# Patient Record
Sex: MALE | Race: WHITE | NOT HISPANIC OR LATINO | Employment: OTHER | URBAN - METROPOLITAN AREA
[De-identification: names, ages, dates, MRNs, and addresses within clinical notes are randomized per-mention and may not be internally consistent; named-entity substitution may affect disease eponyms.]

---

## 2017-11-06 RX ORDER — FINASTERIDE 5 MG/1
5 TABLET, FILM COATED ORAL DAILY
Qty: 90 TABLET | Refills: 3 | Status: SHIPPED | OUTPATIENT
Start: 2017-11-06 | End: 2020-04-16

## 2020-04-16 ENCOUNTER — HOSPITAL ENCOUNTER (EMERGENCY)
Facility: HOSPITAL | Age: 85
Discharge: HOME | End: 2020-04-16
Attending: EMERGENCY MEDICINE
Payer: MEDICARE

## 2020-04-16 ENCOUNTER — HOSPITAL ENCOUNTER (EMERGENCY)
Facility: HOSPITAL | Age: 85
Discharge: OTHER INSTITUTION | End: 2020-04-16
Attending: EMERGENCY MEDICINE
Payer: MEDICARE

## 2020-04-16 VITALS
DIASTOLIC BLOOD PRESSURE: 81 MMHG | RESPIRATION RATE: 17 BRPM | SYSTOLIC BLOOD PRESSURE: 174 MMHG | HEART RATE: 55 BPM | TEMPERATURE: 98 F | BODY MASS INDEX: 27.09 KG/M2 | OXYGEN SATURATION: 97 % | WEIGHT: 200 LBS | HEIGHT: 72 IN

## 2020-04-16 VITALS
OXYGEN SATURATION: 98 % | WEIGHT: 200 LBS | RESPIRATION RATE: 16 BRPM | SYSTOLIC BLOOD PRESSURE: 180 MMHG | HEART RATE: 71 BPM | DIASTOLIC BLOOD PRESSURE: 87 MMHG | BODY MASS INDEX: 27.09 KG/M2 | TEMPERATURE: 98 F | HEIGHT: 72 IN

## 2020-04-16 DIAGNOSIS — G08 CAVERNOUS SINUS THROMBOSIS: ICD-10-CM

## 2020-04-16 DIAGNOSIS — R42 DIZZINESS: Primary | ICD-10-CM

## 2020-04-16 DIAGNOSIS — R11.0 NAUSEA: ICD-10-CM

## 2020-04-16 DIAGNOSIS — J01.20 ACUTE NON-RECURRENT ETHMOIDAL SINUSITIS: Primary | ICD-10-CM

## 2020-04-16 PROBLEM — J32.3 SPHENOID SINUSITIS: Status: ACTIVE | Noted: 2020-04-16

## 2020-04-16 LAB
ALBUMIN SERPL BCP-MCNC: 4.2 G/DL (ref 3.5–5.2)
ALP SERPL-CCNC: 56 U/L (ref 55–135)
ALT SERPL W/O P-5'-P-CCNC: 13 U/L (ref 10–44)
ANION GAP SERPL CALC-SCNC: 7 MMOL/L (ref 8–16)
AST SERPL-CCNC: 20 U/L (ref 10–40)
BASOPHILS # BLD AUTO: 0.05 K/UL (ref 0–0.2)
BASOPHILS NFR BLD: 0.5 % (ref 0–1.9)
BILIRUB SERPL-MCNC: 1.7 MG/DL (ref 0.1–1)
BNP SERPL-MCNC: 49 PG/ML (ref 0–99)
BUN SERPL-MCNC: 13 MG/DL (ref 8–23)
CALCIUM SERPL-MCNC: 9 MG/DL (ref 8.7–10.5)
CHLORIDE SERPL-SCNC: 108 MMOL/L (ref 95–110)
CO2 SERPL-SCNC: 24 MMOL/L (ref 23–29)
CREAT SERPL-MCNC: 0.8 MG/DL (ref 0.5–1.4)
DIFFERENTIAL METHOD: ABNORMAL
EOSINOPHIL # BLD AUTO: 0.1 K/UL (ref 0–0.5)
EOSINOPHIL NFR BLD: 0.8 % (ref 0–8)
ERYTHROCYTE [DISTWIDTH] IN BLOOD BY AUTOMATED COUNT: 13.4 % (ref 11.5–14.5)
EST. GFR  (AFRICAN AMERICAN): >60 ML/MIN/1.73 M^2
EST. GFR  (NON AFRICAN AMERICAN): >60 ML/MIN/1.73 M^2
GLUCOSE SERPL-MCNC: 102 MG/DL (ref 70–110)
HCT VFR BLD AUTO: 49.2 % (ref 40–54)
HGB BLD-MCNC: 16.2 G/DL (ref 14–18)
IMM GRANULOCYTES # BLD AUTO: 0.06 K/UL (ref 0–0.04)
IMM GRANULOCYTES NFR BLD AUTO: 0.7 % (ref 0–0.5)
INR PPP: 1.1
LACTATE SERPL-SCNC: 2.2 MMOL/L (ref 0.5–1.9)
LYMPHOCYTES # BLD AUTO: 1.6 K/UL (ref 1–4.8)
LYMPHOCYTES NFR BLD: 18 % (ref 18–48)
MAGNESIUM SERPL-MCNC: 2 MG/DL (ref 1.6–2.6)
MCH RBC QN AUTO: 32.3 PG (ref 27–31)
MCHC RBC AUTO-ENTMCNC: 32.9 G/DL (ref 32–36)
MCV RBC AUTO: 98 FL (ref 82–98)
MONOCYTES # BLD AUTO: 0.5 K/UL (ref 0.3–1)
MONOCYTES NFR BLD: 5.8 % (ref 4–15)
NEUTROPHILS # BLD AUTO: 6.8 K/UL (ref 1.8–7.7)
NEUTROPHILS NFR BLD: 74.2 % (ref 38–73)
NRBC BLD-RTO: 0 /100 WBC
PLATELET # BLD AUTO: 197 K/UL (ref 150–350)
PMV BLD AUTO: 10.3 FL (ref 9.2–12.9)
POTASSIUM SERPL-SCNC: 3.9 MMOL/L (ref 3.5–5.1)
PROT SERPL-MCNC: 7 G/DL (ref 6–8.4)
PROTHROMBIN TIME: 13.6 SEC (ref 10.6–14.8)
RBC # BLD AUTO: 5.01 M/UL (ref 4.6–6.2)
SARS-COV-2 RDRP RESP QL NAA+PROBE: NEGATIVE
SODIUM SERPL-SCNC: 139 MMOL/L (ref 136–145)
TROPONIN I SERPL DL<=0.01 NG/ML-MCNC: <0.03 NG/ML
WBC # BLD AUTO: 9.11 K/UL (ref 3.9–12.7)

## 2020-04-16 PROCEDURE — 85610 PROTHROMBIN TIME: CPT

## 2020-04-16 PROCEDURE — 36415 COLL VENOUS BLD VENIPUNCTURE: CPT

## 2020-04-16 PROCEDURE — 96376 TX/PRO/DX INJ SAME DRUG ADON: CPT

## 2020-04-16 PROCEDURE — 99285 EMERGENCY DEPT VISIT HI MDM: CPT | Mod: 25

## 2020-04-16 PROCEDURE — 93005 ELECTROCARDIOGRAM TRACING: CPT | Performed by: INTERNAL MEDICINE

## 2020-04-16 PROCEDURE — 83735 ASSAY OF MAGNESIUM: CPT

## 2020-04-16 PROCEDURE — 85025 COMPLETE CBC W/AUTO DIFF WBC: CPT

## 2020-04-16 PROCEDURE — 83605 ASSAY OF LACTIC ACID: CPT

## 2020-04-16 PROCEDURE — 99285 EMERGENCY DEPT VISIT HI MDM: CPT | Mod: ,,, | Performed by: EMERGENCY MEDICINE

## 2020-04-16 PROCEDURE — 25000003 PHARM REV CODE 250: Performed by: EMERGENCY MEDICINE

## 2020-04-16 PROCEDURE — 99284 EMERGENCY DEPT VISIT MOD MDM: CPT | Mod: 25

## 2020-04-16 PROCEDURE — 80053 COMPREHEN METABOLIC PANEL: CPT

## 2020-04-16 PROCEDURE — 84484 ASSAY OF TROPONIN QUANT: CPT

## 2020-04-16 PROCEDURE — 63600175 PHARM REV CODE 636 W HCPCS: Performed by: EMERGENCY MEDICINE

## 2020-04-16 PROCEDURE — 87040 BLOOD CULTURE FOR BACTERIA: CPT

## 2020-04-16 PROCEDURE — U0002 COVID-19 LAB TEST NON-CDC: HCPCS

## 2020-04-16 PROCEDURE — 99285 PR EMERGENCY DEPT VISIT,LEVEL V: ICD-10-PCS | Mod: ,,, | Performed by: EMERGENCY MEDICINE

## 2020-04-16 PROCEDURE — 83880 ASSAY OF NATRIURETIC PEPTIDE: CPT

## 2020-04-16 PROCEDURE — 96365 THER/PROPH/DIAG IV INF INIT: CPT

## 2020-04-16 PROCEDURE — 96375 TX/PRO/DX INJ NEW DRUG ADDON: CPT

## 2020-04-16 RX ORDER — MECLIZINE HCL 12.5 MG 12.5 MG/1
25 TABLET ORAL
Status: COMPLETED | OUTPATIENT
Start: 2020-04-16 | End: 2020-04-16

## 2020-04-16 RX ORDER — FLUTICASONE PROPIONATE 50 MCG
1 SPRAY, SUSPENSION (ML) NASAL 2 TIMES DAILY PRN
Qty: 15 G | Refills: 0 | Status: SHIPPED | OUTPATIENT
Start: 2020-04-16

## 2020-04-16 RX ORDER — VANCOMYCIN HCL IN 5 % DEXTROSE 1G/250ML
1000 PLASTIC BAG, INJECTION (ML) INTRAVENOUS ONCE
Status: COMPLETED | OUTPATIENT
Start: 2020-04-16 | End: 2020-04-16

## 2020-04-16 RX ORDER — CROMOLYN SODIUM 5.2 MG/ML
1 SPRAY, METERED NASAL 4 TIMES DAILY
Qty: 1 BOTTLE | Refills: 0 | Status: SHIPPED | OUTPATIENT
Start: 2020-04-16

## 2020-04-16 RX ADMIN — VANCOMYCIN HYDROCHLORIDE 500 MG: 500 INJECTION, POWDER, LYOPHILIZED, FOR SOLUTION INTRAVENOUS at 12:04

## 2020-04-16 RX ADMIN — MECLIZINE HYDROCHLORIDE 25 MG: 12.5 TABLET ORAL at 09:04

## 2020-04-16 RX ADMIN — VANCOMYCIN HYDROCHLORIDE 1000 MG: 1 INJECTION, POWDER, LYOPHILIZED, FOR SOLUTION INTRAVENOUS at 12:04

## 2020-04-16 RX ADMIN — PIPERACILLIN AND TAZOBACTAM 4.5 G: 4; .5 INJECTION, POWDER, FOR SOLUTION INTRAVENOUS at 11:04

## 2020-04-16 NOTE — HPI
84 yo male with hx of dizziness, dementia presents complaining of dizziness and nausea. He notes that he has had intermittent dizziness for years. He did not sleep well last night and felt a little nauseated this morning which prompted him to get checked out in the ER. Outside CT head showed isolated right sphenoid sinusitis. He was transferred to [facility] for ENT eval. He notes that the dizziness comes and goes, and lasts for a few minutes at a time. It happens sometimes if he stands up too quickly. Gets better when he lies down. He describes it as a feeling of unsteadiness (denies room spinning sensation). He notes that he sometimes has a headache and sees bright spots prior to these episodes. No recent sinus infections or nasal drainage, facial pressure/pain, nasal obstruction. Denies tinnitus, hearing loss during these episodes.  He denies changes in his vision at present. Does not feel dizzy right now. No recent head trauma. No fevers, chills, vomiting, weight loss, extremity weakness, slurred speech.

## 2020-04-16 NOTE — ED NOTES
Pt returned from MRI; pt alert and oriented x 4; respirations even, unlabored; attending at bedside to assess pt; awaiting further orders; will continue to monitor and provide care

## 2020-04-16 NOTE — SUBJECTIVE & OBJECTIVE
Medications:  Continuous Infusions:  Scheduled Meds:  PRN Meds:     Current Facility-Administered Medications on File Prior to Encounter   Medication    [COMPLETED] meclizine tablet 25 mg    [COMPLETED] piperacillin-tazobactam 4.5 g in dextrose 5 % 100 mL IVPB (ready to mix system)    [COMPLETED] vancomycin in dextrose 5 % 1 gram/250 mL IVPB 1,000 mg    And    [COMPLETED] vancomycin 500 mg in dextrose 5 % 100 mL IVPB (ready to mix system)    [DISCONTINUED] vancomycin (VANCOCIN) 1,250 mg in dextrose 5 % 250 mL IVPB    [DISCONTINUED] vancomycin (VANCOCIN) 1,500 mg in dextrose 5 % 500 mL IVPB    [DISCONTINUED] vancomycin - pharmacy to dose     Current Outpatient Medications on File Prior to Encounter   Medication Sig    [DISCONTINUED] finasteride (PROSCAR) 5 mg tablet Take 1 tablet (5 mg total) by mouth once daily.    [DISCONTINUED] vitamins-lipotropics (LIPO-FLAVONOID PLUS) 200-100 mg Tab Take 1 tablet by mouth 3 (three) times daily.       Review of patient's allergies indicates:   Allergen Reactions    No known drug allergies        Past Medical History:   Diagnosis Date    BPH (benign prostatic hypertrophy)     Scrotal mass     Wears glasses      Past Surgical History:   Procedure Laterality Date    EYE SURGERY  BILAT CATARACT WITH LENS    PROSTATE SURGERY      TONSILLECTOMY       Family History     None        Tobacco Use    Smoking status: Never Smoker    Smokeless tobacco: Never Used   Substance and Sexual Activity    Alcohol use: No    Drug use: No    Sexual activity: Not on file     Review of Systems   Constitutional: Negative for chills and fever.   HENT: Negative for ear discharge, ear pain, hearing loss, nosebleeds, rhinorrhea, sinus pressure, sinus pain, tinnitus and trouble swallowing.    Eyes: Negative for visual disturbance.   Respiratory: Negative for shortness of breath.    Cardiovascular: Negative for chest pain.   Gastrointestinal: Positive for nausea. Negative for vomiting.    Endocrine: Negative.    Musculoskeletal: Positive for back pain.   Skin: Negative.    Neurological: Positive for dizziness. Negative for seizures, syncope, speech difficulty and numbness.     Objective:     Vital Signs (Most Recent):  Temp: 97.1 °F (36.2 °C) (04/16/20 1428)  Pulse: (!) 54 (04/16/20 1428)  Resp: 18 (04/16/20 1428)  BP: (!) 175/84 (04/16/20 1428)  SpO2: 96 % (04/16/20 1428) Vital Signs (24h Range):  Temp:  [97.1 °F (36.2 °C)-98 °F (36.7 °C)] 97.1 °F (36.2 °C)  Pulse:  [51-66] 54  Resp:  [14-22] 18  SpO2:  [95 %-99 %] 96 %  BP: (143-176)/(69-99) 175/84     Weight: 90.7 kg (200 lb)  Body mass index is 27.12 kg/m².        Physical Exam  Physical Exam    Vitals:    04/16/20 1428   BP: (!) 175/84   Pulse: (!) 54   Resp: 18   Temp: 97.1 °F (36.2 °C)       General: AAOx3, NAD.   Right Ear: External Auditory Canal WNL,TM w/o masses/lesions/perforations/effusions.  Left Ear:  External Auditory Canal WNL,TM w/o masses/lesions/perforations/effusions.  Nose: No gross nasal septal deviation.  Inferior Turbinates WNL bilaterally.  No septal perforation or hematomas  No masses/lesions. No discharge or purulence seen.  Oral Cavity: FOM Soft, no masses palpated. Oral Tongue mobile. Hard Palate WNL.  Oropharynx: BOT WNL.  No masses/lesions noted. Tonsillar fossa without lesions. Soft palate without masses. Midline uvula.  Neck: No palpable lymphadenopathy at levels I - VI. Full ROM. No thyroid nodules palpated.  Face: HB I bilaterally. Normal sensation.  Eyes: EOM intact bilaterally, PERRLA bilaterally. No exophthalmos/enopthalmos.  Neuro: CN II-XII grossly intact    Significant Labs:  All pertinent labs from the last 24 hours have been reviewed.    Significant Diagnostics:  I have reviewed and interpreted all pertinent imaging results/findings within the past 24 hours.     Chronic right sphenoid sinusitis

## 2020-04-16 NOTE — ED TRIAGE NOTES
Pt arrived via EMS from outside facility; pt transferred for ENT consult; pt endorses dizziness; denies SOB, CP, N/V; pt A&Ox4, respirations even, unlabored

## 2020-04-16 NOTE — DISCHARGE INSTRUCTIONS
Follow-up with primary care physician within 1 week of discharge if possible.  Follow-up with ENT physicians, referral provided.  He should be contacted to schedule an appointment for your ENT follow-up.  If you are not contacted by Tuesday 4/21, please call the number provided in your discharge summary to schedule your appointment.    Return to the ED with worsening of dizziness, fall, or loss of consciousness.

## 2020-04-16 NOTE — ED PROVIDER NOTES
Encounter Date: 4/16/2020       History     Chief Complaint   Patient presents with    Dizziness     states since yesterday worse with movement, denies cough, fever or shortness of breathh     Patient presents complaining of dizziness.  Patient states he was dizzy and nauseated throughout the night and very dizzy this morning and very unsteady difficulty walking.  At the worst symptoms are moderate.  History is difficult patient has mild dementia.  Brother also provides additional history stating that he did tell him he was nauseated and had difficulty sleeping last night.        Review of patient's allergies indicates:   Allergen Reactions    No known drug allergies      Past Medical History:   Diagnosis Date    BPH (benign prostatic hypertrophy)     Scrotal mass     Wears glasses      Past Surgical History:   Procedure Laterality Date    EYE SURGERY  BILAT CATARACT WITH LENS    PROSTATE SURGERY      TONSILLECTOMY       Family History   Problem Relation Age of Onset    Prostate cancer Neg Hx     Urolithiasis Neg Hx     Kidney cancer Neg Hx      Social History     Tobacco Use    Smoking status: Never Smoker    Smokeless tobacco: Never Used   Substance Use Topics    Alcohol use: No    Drug use: No     Review of Systems   Neurological: Positive for dizziness.   All other systems reviewed and are negative.      Physical Exam     Initial Vitals [04/16/20 0838]   BP Pulse Resp Temp SpO2   (!) 173/89 60 18 97.7 °F (36.5 °C) 97 %      MAP       --         Physical Exam    Nursing note and vitals reviewed.  Constitutional: He appears well-developed and well-nourished. He is not diaphoretic. No distress.   HENT:   Head: Normocephalic and atraumatic.   Eyes: EOM are normal.   Neck: Normal range of motion. Neck supple.   Pulmonary/Chest: No respiratory distress.   Neurological: He is alert. He has normal strength. No cranial nerve deficit.   Patient has difficulty standing he is unsteady   Skin: Skin is warm and  dry.   Psychiatric: He has a normal mood and affect. His behavior is normal. Judgment and thought content normal.         ED Course   Procedures  Labs Reviewed   COMPREHENSIVE METABOLIC PANEL - Abnormal; Notable for the following components:       Result Value    Total Bilirubin 1.7 (*)     Anion Gap 7 (*)     All other components within normal limits   CBC W/ AUTO DIFFERENTIAL - Abnormal; Notable for the following components:    Mean Corpuscular Hemoglobin 32.3 (*)     Immature Granulocytes 0.7 (*)     Immature Grans (Abs) 0.06 (*)     Gran% 74.2 (*)     All other components within normal limits   CULTURE, BLOOD   CULTURE, BLOOD   MAGNESIUM   TROPONIN I   PROTIME-INR   B-TYPE NATRIURETIC PEPTIDE   SARS-COV-2 RNA AMPLIFICATION, QUAL   LACTIC ACID, PLASMA     EKG Readings: (Independently Interpreted)   EKG is without acute ST changes     ECG Results          EKG 12-LEAD (In process)  Result time 04/16/20 09:04:41    In process by Interface, Lab In [facility] (04/16/20 09:04:41)                 Narrative:    Test Reason : R07.9,    Vent. Rate : 061 BPM     Atrial Rate : 067 BPM     P-R Int : 000 ms          QRS Dur : 086 ms      QT Int : 432 ms       P-R-T Axes : 000 019 050 degrees     QTc Int : 434 ms    Undetermined rhythm  Otherwise normal ECG  When compared with ECG of 14-JAN-2016 09:42,  Current undetermined rhythm precludes rhythm comparison, needs review    Referred By: [email or web address]   SELF           Confirmed By:                             Imaging Results          CT Head Without Contrast (Final result)  Result time 04/16/20 09:40:45    Final result by Ry Tse MD (04/16/20 09:40:45)                 Impression:      Chronic right sphenoid sinusitis, with osseous erosion of the posterior wall and possible involvement of cavernous sinus.  Please correlate for signs and symptoms of cavernous sinus thrombosis.    No CT evidence of acute intraparenchymal process involving the brain.      Electronically signed  by: Ry Tse MD  Date:    04/16/2020  Time:    09:40             Narrative:    EXAMINATION:  CT HEAD WITHOUT CONTRAST    CLINICAL HISTORY:  Dizziness;Focal neuro deficit, new, fixed or worsening, <6 hours;    TECHNIQUE:  CMS Mandated Quality Data-CT Radiation Dose-436    All CT scans at this facility dose modulation, iterative reconstruction, and or weight-based dosing when appropriate to reduce radiation dose to as low as reasonably achievable.    COMPARISON:  None    FINDINGS:  Negative for acute intracranial hemorrhage, midline shift, or mass effect. Ventricles and sulci are normal in size. Mild periventricular white matter hypoattenuation consistent with microangiopathic change. Cerebellar hemispheres and brainstem are unremarkable. Atherosclerotic calcification of the internal carotid arteries.    Complete mucosal opacification of the right sphenoid sinus with right sphenoid sinus osseous thickening and erosion along the posterior wall (images 26 and 27) with possible right cavernous sinus communication.  Mild bilateral polypoid maxillary sinus mucosal thickening. Mastoid air cells are clear.                               X-ray Chest AP Portable (Final result)  Result time 04/16/20 09:17:09    Final result by Ry Tse MD (04/16/20 09:17:09)                 Impression:      Increased perihilar interstitial markings could reflect mild interstitial edema versus atypical infection.      Electronically signed by: Ry Tse MD  Date:    04/16/2020  Time:    09:17             Narrative:    EXAMINATION:  XR CHEST AP PORTABLE    COMPARISON:  01/14/2016    FINDINGS:  Cardiomediastinal silhouette is within normal limits.  Increased interstitial markings involving the perihilar regions bilaterally.  No confluent airspace disease.  No pleural effusion or pneumothorax.  No acute osseous abnormality.Right jose-colon is interposed between the liver and right hemidiaphragm.                                  Medical Decision Making:   ED Management:  Patient has CT concerning for possible right sphenoid sinus erosion into posterior wall and possible communication with the cavernous sinus.  There is no ENT on call at this hospital.  I did discuss this with Dr. Foster ENT on-call with [facility] who advised ER to ER transfer.  Patient was given antibiotics at her hospital Zosyn and vancomycin.  Patient made benefit from more advanced imaging in both neuro surgical and ENT consultation at [facility].  He remains awake alert oriented the safe for transfer.                                 Clinical Impression:       ICD-10-CM ICD-9-CM   1. 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

## 2020-04-16 NOTE — CONSULTS
[facility]  Otorhinolaryngology-Head & Neck Surgery  Consult Note    Patient Name: Elpidio Ayala  MRN: [id number]  Code Status: No Order  Admission Date: 4/16/2020  Hospital Length of Stay: 0 days  Attending Physician: Nydia Bell MD  Primary Care Provider: Cali Win Jr, MD    Patient information was obtained from patient and ER records.     Inpatient consult to ENT  Consult performed by: Chan Márquez Jr., MD  Consult ordered by: Dev Pompa MD        Subjective:     Chief Complaint/Reason for Admission: dizziness    History of Present Illness: 86 yo male with hx of dizziness, dementia presents complaining of dizziness and nausea. He notes that he has had intermittent dizziness for years. He did not sleep well last night and felt a little nauseated this morning which prompted him to get checked out in the ER. Outside CT head showed isolated right sphenoid sinusitis. He was transferred to [facility] for ENT eval. He notes that the dizziness comes and goes, and lasts for a few minutes at a time. It happens sometimes if he stands up too quickly. Gets better when he lies down. He describes it as a feeling of unsteadiness (denies room spinning sensation). He notes that he sometimes has a headache and sees bright spots prior to these episodes. No recent sinus infections or nasal drainage, facial pressure/pain, nasal obstruction. Denies tinnitus, hearing loss during these episodes.  He denies changes in his vision at present. Does not feel dizzy right now. No recent head trauma. No fevers, chills, vomiting, weight loss, extremity weakness, slurred speech.    Medications:  Continuous Infusions:  Scheduled Meds:  PRN Meds:     Current Facility-Administered Medications on File Prior to Encounter   Medication    [COMPLETED] meclizine tablet 25 mg    [COMPLETED] piperacillin-tazobactam 4.5 g in dextrose 5 % 100 mL IVPB (ready to mix system)    [COMPLETED] vancomycin in  dextrose 5 % 1 gram/250 mL IVPB 1,000 mg    And    [COMPLETED] vancomycin 500 mg in dextrose 5 % 100 mL IVPB (ready to mix system)    [DISCONTINUED] vancomycin (VANCOCIN) 1,250 mg in dextrose 5 % 250 mL IVPB    [DISCONTINUED] vancomycin (VANCOCIN) 1,500 mg in dextrose 5 % 500 mL IVPB    [DISCONTINUED] vancomycin - pharmacy to dose     Current Outpatient Medications on File Prior to Encounter   Medication Sig    [DISCONTINUED] finasteride (PROSCAR) 5 mg tablet Take 1 tablet (5 mg total) by mouth once daily.    [DISCONTINUED] vitamins-lipotropics (LIPO-FLAVONOID PLUS) 200-100 mg Tab Take 1 tablet by mouth 3 (three) times daily.       Review of patient's allergies indicates:   Allergen Reactions    No known drug allergies        Past Medical History:   Diagnosis Date    BPH (benign prostatic hypertrophy)     Scrotal mass     Wears glasses      Past Surgical History:   Procedure Laterality Date    EYE SURGERY  BILAT CATARACT WITH LENS    PROSTATE SURGERY      TONSILLECTOMY       Family History     None        Tobacco Use    Smoking status: Never Smoker    Smokeless tobacco: Never Used   Substance and Sexual Activity    Alcohol use: No    Drug use: No    Sexual activity: Not on file     Review of Systems   Constitutional: Negative for chills and fever.   HENT: Negative for ear discharge, ear pain, hearing loss, nosebleeds, rhinorrhea, sinus pressure, sinus pain, tinnitus and trouble swallowing.    Eyes: Negative for visual disturbance.   Respiratory: Negative for shortness of breath.    Cardiovascular: Negative for chest pain.   Gastrointestinal: Positive for nausea. Negative for vomiting.   Endocrine: Negative.    Musculoskeletal: Positive for back pain.   Skin: Negative.    Neurological: Positive for dizziness. Negative for seizures, syncope, speech difficulty and numbness.     Objective:     Vital Signs (Most Recent):  Temp: 97.1 °F (36.2 °C) (04/16/20 1428)  Pulse: (!) 54 (04/16/20 1428)  Resp: 18  (04/16/20 1428)  BP: (!) 175/84 (04/16/20 1428)  SpO2: 96 % (04/16/20 1428) Vital Signs (24h Range):  Temp:  [97.1 °F (36.2 °C)-98 °F (36.7 °C)] 97.1 °F (36.2 °C)  Pulse:  [51-66] 54  Resp:  [14-22] 18  SpO2:  [95 %-99 %] 96 %  BP: (143-176)/(69-99) 175/84     Weight: 90.7 kg (200 lb)  Body mass index is 27.12 kg/m².        Physical Exam  Physical Exam    Vitals:    04/16/20 1428   BP: (!) 175/84   Pulse: (!) 54   Resp: 18   Temp: 97.1 °F (36.2 °C)       General: AAOx3, NAD.   Right Ear: External Auditory Canal WNL,TM w/o masses/lesions/perforations/effusions.  Left Ear:  External Auditory Canal WNL,TM w/o masses/lesions/perforations/effusions.  Nose: No gross nasal septal deviation.  Inferior Turbinates WNL bilaterally.  No septal perforation or hematomas  No masses/lesions. No discharge or purulence seen.  Oral Cavity: FOM Soft, no masses palpated. Oral Tongue mobile. Hard Palate WNL.  Oropharynx: BOT WNL.  No masses/lesions noted. Tonsillar fossa without lesions. Soft palate without masses. Midline uvula.  Neck: No palpable lymphadenopathy at levels I - VI. Full ROM. No thyroid nodules palpated.  Face: HB I bilaterally. Normal sensation.  Eyes: EOM intact bilaterally, PERRLA bilaterally. No exophthalmos/enopthalmos.  Neuro: CN II-XII grossly intact    Significant Labs:  All pertinent labs from the last 24 hours have been reviewed.    Significant Diagnostics:  I have reviewed and interpreted all pertinent imaging results/findings within the past 24 hours.     Chronic right sphenoid sinusitis    Assessment/Plan:     Sphenoid sinusitis  Appears chronic in nature on scan. No classic symptoms of sinusitis. Questionable dehiscence of posterior wall of sphenoid sinus on CT head.    -MRV not consistent with cavernous sinus thrombosis  -unlikely that sphenoid sinusitis is the cause of his dizziness  -patient can follow up with Dr. Smith as an outpatient for his sinusitis  -recommend daily flonase and ocean  spray      Dizziness  Likely multifactorial. His story sounds consistent with a cardiac/orthostatic source. There may be a migraine component. Suspicion for cavernous sinus thrombosis is very low, as this usually presents with headache, visual changes, chemosis, etc.    -recommend PCP follow up for dizziness workup      VTE Risk Mitigation (From admission, onward)    None          Thank you for your consult.     Chan Márquez Jr., MD  Otorhinolaryngology-Head & Neck Surgery  [facility]

## 2020-04-16 NOTE — ED NOTES
Pt and brother states he has been dizzy for years, mainly when he gets up to fast. Wha'ts different is that he got nausea. Denies CP or SOB. Gets dizzy on movement

## 2020-04-16 NOTE — ASSESSMENT & PLAN NOTE
Likely multifactorial. His story sounds consistent with a cardiac/orthostatic source. There may be a migraine component. Suspicion for cavernous sinus thrombosis is very low, as this usually presents with headache, visual changes, chemosis, etc.    -recommend PCP follow up for dizziness workup

## 2020-04-16 NOTE — PROGRESS NOTES
VANCOMYCIN PHARMACOKINETIC NOTE:  Vancomycin Day # 1    Objective/Assessment:    Diagnosis/Indication for Vancomycin: Bone/Joint infection     85 y.o., male; Actual Body Weight = 90.7 kg (200 lb).    The patient has the following labs:  4/16/2020 Estimated Creatinine Clearance: 75.4 mL/min (based on SCr of 0.8 mg/dL). Lab Results   Component Value Date    BUN 13 04/16/2020       Lab Results   Component Value Date    WBC 9.11 04/16/2020          Plan:  Adjust vancomycin dose and/or frequency based on the patient's actual weight and renal function:  Initiate Vancomycin 1500 mg IV every 18 hours.  Orders have been entered into patient's chart.      Vancomycin trough level has been ordered for 4/18/20 @ 17:00.    Pharmacy will manage vancomycin therapy, monitor serum vancomycin levels, monitor renal function and adjust regimen as necessary.    Thank you for allowing us to participate in this patient's care.     Axel Woodard 4/16/2020 1:16 PM  Department of Pharmacy  Ext [phone]

## 2020-04-16 NOTE — ASSESSMENT & PLAN NOTE
Appears chronic in nature on scan. No classic symptoms of sinusitis. Questionable dehiscence of posterior wall of sphenoid sinus on CT head.    -MRV not consistent with cavernous sinus thrombosis  -unlikely that sphenoid sinusitis is the cause of his dizziness  -patient can follow up with Dr. Smith as an outpatient for his sinusitis  -recommend daily flonase and ocean spray

## 2020-04-16 NOTE — PROVIDER PROGRESS NOTES - EMERGENCY DEPT.
Emergency Department TeleTRIAGE Encounter Note      CHIEF COMPLAINT    Chief Complaint   Patient presents with    Dizziness     dizziness and nausea onset today, transfer from [facility] for ENT consult.       VITAL SIGNS   Initial Vitals [04/16/20 1428]   BP Pulse Resp Temp SpO2   (!) 175/84 (!) 54 18 97.1 °F (36.2 °C) 96 %      MAP       --            ALLERGIES    Review of patient's allergies indicates:   Allergen Reactions    No known drug allergies        PROVIDER TRIAGE NOTE  Transfer from outside hospital, initial complain of dizziness difficulty walking on stable gait.  CT scan showed sinusitis with possible right sphenoid erosion possibly communicated with the cavernous sinus.  Labs as noted.  Patient is not complaining of pain.  Will place ENT consult and attempt to discuss with them.  MRI brain with and without for the character a shin of sinusitis/lesion.      ORDERS  Labs Reviewed - No data to display    Imaging Results    None           Virtual Visit Note: The provider triage portion of this emergency department evaluation and documentation was performed via [employer], a HIPAA-compliant telemedicine application, in concert with a tele-presenter in the room. A face to face patient evaluation with one of my colleagues will occur once the patient is placed in an emergency department room.      DISCLAIMER: This note was prepared with [employer] voice recognition transcription software. Garbled syntax, mangled pronouns, and other bizarre constructions may be attributed to that software system.

## 2020-04-16 NOTE — ED NOTES
Pt A&Ox4, NAD noted at this time; respirations even, unlabored; awaiting further orders; brother at bedside at this time; will continue to monitor and provide care

## 2020-04-16 NOTE — ED PROVIDER NOTES
Encounter Date: 4/16/2020       History     Chief Complaint   Patient presents with    Dizziness     dizziness and nausea onset today, transfer from [facility] for ENT consult.     85 year old male with PMH of BPH and dementia who presents as a transfer from [place] for ENT evaluation. Patient initially presented to outside ED for dizziness and nausea. Patient demented at baseline so his brother gave most of the history. Brother states pt called him this morning complaining of nausea and inability to sleep last night. No headache or vomiting. He has had postural dizziness for many years but never been evaluated for this. He was taken to [place] where CT was concerning for possible right sphenoid sinus erosion into posterior wall and possible communication with the cavernous sinus.  Patient was given antibiotics at her hospital Zosyn and vancomycin. OSH does not have ENT so patient was transferred here.  No known recent fevers, cough, chest pain, or SOB        Review of patient's allergies indicates:   Allergen Reactions    No known drug allergies      Past Medical History:   Diagnosis Date    BPH (benign prostatic hypertrophy)     Scrotal mass     Wears glasses      Past Surgical History:   Procedure Laterality Date    EYE SURGERY  BILAT CATARACT WITH LENS    PROSTATE SURGERY      TONSILLECTOMY       Family History   Problem Relation Age of Onset    Prostate cancer Neg Hx     Urolithiasis Neg Hx     Kidney cancer Neg Hx      Social History     Tobacco Use    Smoking status: Never Smoker    Smokeless tobacco: Never Used   Substance Use Topics    Alcohol use: No    Drug use: No     Review of Systems   Constitutional: Negative for fever.   HENT: Negative for sore throat.    Respiratory: Negative for shortness of breath.    Cardiovascular: Negative for chest pain.   Gastrointestinal: Positive for nausea.   Genitourinary: Negative for dysuria.   Musculoskeletal: Negative for back pain.   Skin:  Negative for rash.   Neurological: Positive for dizziness. Negative for weakness.   Hematological: Does not bruise/bleed easily.       Physical Exam     Initial Vitals [04/16/20 1428]   BP Pulse Resp Temp SpO2   (!) 175/84 (!) 54 18 97.1 °F (36.2 °C) 96 %      MAP       --         Physical Exam    Constitutional: He appears well-developed and well-nourished. He is not diaphoretic. No distress.   HENT:   Head: Normocephalic and atraumatic.   Eyes: EOM are normal. Pupils are equal, round, and reactive to light.   Neck: Normal range of motion. Neck supple. No JVD present.   Cardiovascular: Normal rate and regular rhythm.   Pulmonary/Chest: No respiratory distress.   Abdominal: Soft. He exhibits no distension. There is no tenderness.   Musculoskeletal: He exhibits no edema.   Neurological: He is alert and oriented to person, place, and time. GCS score is 15. GCS eye subscore is 4. GCS verbal subscore is 5. GCS motor subscore is 6.   Skin: Skin is warm and dry.   Psychiatric: He has a normal mood and affect.         ED Course   Procedures  Labs Reviewed - No data to display       Imaging Results          MRV Brain Without Contrast (Final result)  Result time 04/16/20 17:05:13    Final result by Arron Prieto MD (04/16/20 17:05:13)                 Impression:      No MRV evidence of venous sinus thrombosis.      Electronically signed by: Arron Prieto MD  Date:    04/16/2020  Time:    17:05             Narrative:    EXAMINATION:  MRV BRAIN WITHOUT CONTRAST    CLINICAL HISTORY:  Dural venous sinus thrombosis suspected;    TECHNIQUE:  3D MRV of the intracranial circulation was obtained without contrast.  MIP reconstructions were obtained and reviewed.    COMPARISON:  CT scan of the head dated 04/16/2020.    FINDINGS:  The superior sagittal sinus is within normal limits.  The cortical veins are unremarkable.  The inferior sagittal sinus is hypoplastic.  The internal cerebral veins are within normal limits.  The straight sinus  is within normal limits.  The torcular is within normal limits.  The transverse and sigmoid sinuses are within normal limits.  The visualized internal jugular veins are unremarkable.                                 Medical Decision Making:   History:   I obtained history from: someone other than patient.       <> Summary of History: Patients brother  Old Medical Records: I decided to obtain old medical records.  Old Records Summarized: records from another hospital.  Initial Assessment:   85 year old male presents due to CT findings of possible sinusitis extension into cavernous sinus. ENT consulted and recommends MRV for further assessment.   Differential Diagnosis:   DDx: sinusitis, cavernous sinus thrombosis, orthostatic dizziness   Independently Interpreted Test(s):   I have ordered and independently interpreted X-rays - see prior notes.  I have ordered and independently interpreted EKG Reading(s) - see prior notes  Clinical Tests:   Lab Tests: Ordered and Reviewed  Radiological Study: Ordered and Reviewed  Medical Tests: Ordered and Reviewed  ED Management:  ENT consulted and obtaining MRV per recs  Orthostatics negative  MRV negative discussed disposition with ENT  Patient to be discharged home with nasal saline, Flonase follow-up with PCP and ENT                Attending Attestation:   Physician Attestation Statement for Resident:  As the supervising MD   Physician Attestation Statement: I have personally seen and examined this patient.   I agree with the above history. -:   As the supervising MD I agree with the above PE.    As the supervising MD I agree with the above treatment, course, plan, and disposition.  I have reviewed the following: records from a referring facility.                                  Clinical Impression:       ICD-10-CM ICD-9-CM   1. Dizziness R42 780.4   2. Nausea R11.0 787.02             ED Disposition Condition    Discharge Stable        ED Prescriptions     Medication Sig  Dispense Start Date End Date Auth. Provider    cromolyn (NASALCHROM) 5.2 mg/spray (4 %) nasal spray 1 spray by Nasal route 4 (four) times daily. 1 Bottle 4/16/2020  David Wolf MD    fluticasone propionate (FLONASE) 50 mcg/actuation nasal spray 1 spray (50 mcg total) by Each Nostril route 2 (two) times daily as needed for Rhinitis. 15 g 4/16/2020  David Wolf MD        Follow-up Information     Follow up With Specialties Details Why Contact Info    Cali Win Jr., MD Family Medicine Schedule an appointment as soon as possible for a visit in 1 week Dizziness workup - suspect orthostatic source [address] [facility] [address]  [phone]      [facility] ENT Otolaryngology Schedule an appointment as soon as possible for a visit in 1 week Sinusitis [address] [facility] [address]  [phone]                                     David Wolf MD  Resident  04/16/20 8515       Nydia Bell MD  04/16/20 6108

## 2020-04-17 ENCOUNTER — PES CALL (OUTPATIENT)
Dept: ADMINISTRATIVE | Facility: CLINIC | Age: 85
End: 2020-04-17

## 2020-04-17 NOTE — ED NOTES
Pt A&Ox4, respirations even, unlabored, NAD; pt verbalized understanding of discharge instructions; no unanswered questions at this time; pt agrees to discharge plan

## 2020-04-20 ENCOUNTER — TELEPHONE (OUTPATIENT)
Dept: OTOLARYNGOLOGY | Facility: CLINIC | Age: 85
End: 2020-04-20

## 2020-04-20 NOTE — TELEPHONE ENCOUNTER
----- Message from Dorothea Cortes LPN sent at 4/20/2020  2:39 PM CDT -----  Hi Dr. Smith, please advise.  ----- Message -----  From: Glenna Miles RN  Sent: 4/17/2020   2:20 PM CDT  To: JAGUAR Pimentel Dr., called me saying that he could not do an audio visit. I called the patient & his brother said that he could not do virtual visits. Please discuss with Dr. Smith.  ----- Message -----  From: Andrew Buenrostro  Sent: 4/17/2020   1:40 PM CDT  To: Luis Becerra Staff    Pt has not received his virtual phone call visit insisted on message being sent       Pt can be reached at [phone]

## 2020-04-21 LAB
BACTERIA BLD CULT: NORMAL
BACTERIA BLD CULT: NORMAL

## 2020-05-26 ENCOUNTER — TELEPHONE (OUTPATIENT)
Dept: OTOLARYNGOLOGY | Facility: CLINIC | Age: 85
End: 2020-05-26

## 2020-05-26 DIAGNOSIS — J32.3 SPHENOID SINUSITIS, UNSPECIFIED CHRONICITY: Primary | ICD-10-CM

## 2020-05-29 DIAGNOSIS — J32.3 SPHENOID SINUSITIS, UNSPECIFIED CHRONICITY: Primary | ICD-10-CM

## 2020-05-31 ENCOUNTER — LAB VISIT (OUTPATIENT)
Dept: PRIMARY CARE CLINIC | Facility: CLINIC | Age: 85
End: 2020-05-31
Payer: MEDICARE

## 2020-05-31 DIAGNOSIS — J32.3 SPHENOID SINUSITIS, UNSPECIFIED CHRONICITY: ICD-10-CM

## 2020-05-31 PROCEDURE — U0003 INFECTIOUS AGENT DETECTION BY NUCLEIC ACID (DNA OR RNA); SEVERE ACUTE RESPIRATORY SYNDROME CORONAVIRUS 2 (SARS-COV-2) (CORONAVIRUS DISEASE [COVID-19]), AMPLIFIED PROBE TECHNIQUE, MAKING USE OF HIGH THROUGHPUT TECHNOLOGIES AS DESCRIBED BY CMS-2020-01-R: HCPCS

## 2020-06-01 LAB — SARS-COV-2 RNA RESP QL NAA+PROBE: NOT DETECTED

## 2020-06-02 ENCOUNTER — OFFICE VISIT (OUTPATIENT)
Dept: OTOLARYNGOLOGY | Facility: CLINIC | Age: 85
End: 2020-06-02
Payer: MEDICARE

## 2020-06-02 VITALS
BODY MASS INDEX: 26.01 KG/M2 | HEART RATE: 61 BPM | SYSTOLIC BLOOD PRESSURE: 120 MMHG | HEIGHT: 72 IN | DIASTOLIC BLOOD PRESSURE: 64 MMHG | WEIGHT: 192 LBS

## 2020-06-02 DIAGNOSIS — J32.3 CHRONIC SPHENOIDAL SINUSITIS: Primary | ICD-10-CM

## 2020-06-02 DIAGNOSIS — R42 DIZZINESS: ICD-10-CM

## 2020-06-02 PROCEDURE — 31231 NASAL/SINUS ENDOSCOPY: ICD-10-PCS | Mod: S$PBB,,, | Performed by: OTOLARYNGOLOGY

## 2020-06-02 PROCEDURE — 99203 OFFICE O/P NEW LOW 30 MIN: CPT | Mod: 25,S$PBB,, | Performed by: OTOLARYNGOLOGY

## 2020-06-02 PROCEDURE — 99213 OFFICE O/P EST LOW 20 MIN: CPT | Mod: PBBFAC,25 | Performed by: OTOLARYNGOLOGY

## 2020-06-02 PROCEDURE — 99999 PR PBB SHADOW E&M-EST. PATIENT-LVL III: CPT | Mod: PBBFAC,,, | Performed by: OTOLARYNGOLOGY

## 2020-06-02 PROCEDURE — 99999 PR PBB SHADOW E&M-EST. PATIENT-LVL III: ICD-10-PCS | Mod: PBBFAC,,, | Performed by: OTOLARYNGOLOGY

## 2020-06-02 PROCEDURE — 31231 NASAL ENDOSCOPY DX: CPT | Mod: PBBFAC | Performed by: OTOLARYNGOLOGY

## 2020-06-02 PROCEDURE — 99203 PR OFFICE/OUTPT VISIT, NEW, LEVL III, 30-44 MIN: ICD-10-PCS | Mod: 25,S$PBB,, | Performed by: OTOLARYNGOLOGY

## 2020-06-02 NOTE — LETTER
June 2, 2020      Arturo Foster MD  [address]  [facility] [address]           [facility] - Otorhinolaryngology  [address]  [facility] [address]  Phone: [phone]  Fax: [phone]          Patient: Elpidio Ayala   MR Number: [id number]   YOB: 1935   Date of Visit: 6/2/2020       Dear Dr. Arturo Foster:    Thank you for referring Elpidio Ayala to me for evaluation. Attached you will find relevant portions of my assessment and plan of care.    If you have questions, please do not hesitate to call me. I look forward to following Elpidio Ayala along with you.    Sincerely,    Hernan Smith MD    Enclosure  CC:  No Recipients    If you would like to receive this communication electronically, please contact [email or web address] or [phone] to request more information on [employer] access.    For providers and/or their staff who would like to refer a patient to [facility], please contact us through our one-stop-shop provider referral line, [facility], at [phone].    If you feel you have received this communication in error or would no longer like to receive these types of communications, please e-mail [email or web address]

## 2020-06-02 NOTE — PROGRESS NOTES
Subjective:      Elpidio Ayala is a 85 y.o. male who was referred to me by Dr. Arturo Foster in consultation for sinusitis.    He was in his usual state of health until about 1 month ago when he had worsening dizziness and imbalance, which resulted in an emergency department visit and CT scan that showed a sphenoid lesion.  He was evaluated by the ENT service in the ED and discharged.  He notes that he was given fluticasone spray and his dizziness has now resolved and he feels fine.  He denies nasal blockage, facial pressure, headache, vision changes, hyposmia, or notable sinus infections.  He notes occasional nonspecific rhinorrhea that is mild.    Current sinonasal medications as above.  The last course of antibiotics was a long time ago.  He does not regularly use nasal decongestant sprays.    He does not recall previously having allergy testing.    He denies a history of asthma.    He denies a history of reflux symptoms.  He has not previously had an EGD.    He denies a diagnosis of obstructive sleep apnea.     He has not had sinonasal surgery.  He has had a tonsillectomy.    He does not recall a prior history of nasal trauma.        %         Past Medical History  He has a past medical history of BPH (benign prostatic hypertrophy), Scrotal mass, and Wears glasses.    Past Surgical History  He has a past surgical history that includes Tonsillectomy; Prostate surgery; and Eye surgery (BILAT CATARACT WITH LENS).    Family History  His family history is not on file.    Social History  He reports that he has never smoked. He has never used smokeless tobacco. He reports that he does not drink alcohol or use drugs.    Allergies  He is allergic to no known drug allergies.    Medications   He has a current medication list which includes the following prescription(s): cromolyn and fluticasone propionate.    Review of Systems  Review of Systems   Constitutional: Negative for fatigue, fever and unexpected  weight change.   HENT: Negative for congestion, dental problem, ear discharge, ear pain, facial swelling, hearing loss, hoarse voice, nosebleeds, postnasal drip, rhinorrhea, sinus pressure, sore throat, tinnitus, trouble swallowing and voice change.    Eyes: Negative for photophobia, discharge, itching and visual disturbance.   Respiratory: Negative for apnea, cough, shortness of breath and wheezing.    Cardiovascular: Negative for chest pain and palpitations.   Gastrointestinal: Negative for abdominal pain, nausea and vomiting.   Endocrine: Negative for cold intolerance and heat intolerance.   Genitourinary: Negative for difficulty urinating.   Musculoskeletal: Negative for arthralgias, back pain, myalgias and neck pain.   Skin: Negative for rash.   Allergic/Immunologic: Negative for environmental allergies and food allergies.   Neurological: Negative for dizziness, seizures, syncope, weakness and headaches.   Hematological: Negative for adenopathy. Does not bruise/bleed easily.   Psychiatric/Behavioral: Negative for decreased concentration, dysphoric mood and sleep disturbance. The patient is not nervous/anxious.           Objective:     /64   Pulse 61   Ht 6' (1.829 m)   Wt 87.1 kg (192 lb 0.3 oz)   BMI 26.04 kg/m²        Constitutional:   He appears well-developed. He is cooperative. Normal speech.  No hoarse voice.      Head:  Normocephalic. Salivary glands normal.  Facial strength is normal.      Ears:    Right Ear: No drainage or tenderness. Tympanic membrane is not perforated. Tympanic membrane mobility is normal. No middle ear effusion. No decreased hearing is noted.   Left Ear: No drainage or tenderness. Tympanic membrane is not perforated. Tympanic membrane mobility is normal.  No middle ear effusion. No decreased hearing is noted.     Nose:  Septal deviation present. No mucosal edema, rhinorrhea or polyps. No epistaxis. Turbinates normal, no turbinate masses and no turbinate hypertrophy.  Right  sinus exhibits no maxillary sinus tenderness and no frontal sinus tenderness. Left sinus exhibits no maxillary sinus tenderness and no frontal sinus tenderness.     Mouth/Throat  Oropharynx clear and moist without lesions or asymmetry and normal uvula midline. He does not have dentures. Normal dentition. No oral lesions or mucous membrane lesions. No oropharyngeal exudate or posterior oropharyngeal erythema. Mirror exam not performed due to patient tolerance.  Mirror exam not performed due to patient tolerance.      Neck:  Neck normal without thyromegaly masses, asymmetry, normal tracheal structure, crepitus, and tenderness, thyroid normal, trachea normal and no adenopathy. Normal range of motion present.     He has no cervical adenopathy.     Cardiovascular:   Regular rhythm.      Pulmonary/Chest:   Effort normal.     Psychiatric:   He has a normal mood and affect. His speech is normal and behavior is normal.     Neurological:   No cranial nerve deficit.     Skin:   No rash noted.       Procedure    Nasal endoscopy performed.  See procedure note.     Left choana     Sphenoid ostium     Right choana        Data Reviewed    WBC (K/uL)   Date Value   04/16/2020 9.11     Eosinophil% (%)   Date Value   04/16/2020 0.8     Eos # (K/uL)   Date Value   04/16/2020 0.1     Platelets (K/uL)   Date Value   04/16/2020 197     Glucose (mg/dL)   Date Value   04/16/2020 102     No results found for: IGE    I independently reviewed the images of the CT sinuses dated 4/16/20. Pertinent findings include completely opacificed right sphenoid sinus with surrounding bony thickening.       Assessment:     1. Chronic sphenoidal sinusitis    2. Dizziness         Plan:     I had a long discussion with the patient and his brother regarding his condition and the further workup and management options.  The sphenoid ostium is visible and patent and there is now low concern for a neoplastic or significiant infectious process.  I advised that he  continue his nasal spray and notify me if new symptoms should arise.    Follow up if symptoms worsen or fail to improve.

## 2020-06-02 NOTE — PROCEDURES
Nasal/sinus endoscopy  Date/Time: 6/2/2020 9:00 AM  Performed by: Hernan Smith MD  Authorized by: Hernan Smith MD     Consent Done?:  Yes (Verbal)  Anesthesia:     Local anesthetic:  Lidocaine 4% and Tomas-Synephrine 1/2%    Patient tolerance:  Patient tolerated the procedure well with no immediate complications  Nose:     Procedure Performed:  Nasal Endoscopy  External:      No external nasal deformity  Intranasal:      Mucosa no polyps     Mucosa ulcers not present     No mucosa lesions present     Turbinates not enlarged     Septum gross deformity  Nasopharynx:      No mucosa lesions     Adenoids not present     Posterior choanae patent     Eustachian tube patent     Sphenoid ostium visible and patent.  No edema, mass, polyp or mucus.

## 2020-12-20 ENCOUNTER — HOSPITAL ENCOUNTER (EMERGENCY)
Facility: HOSPITAL | Age: 85
Discharge: HOME | End: 2020-12-20
Attending: EMERGENCY MEDICINE
Payer: MEDICARE

## 2020-12-20 VITALS
HEIGHT: 72 IN | TEMPERATURE: 98 F | OXYGEN SATURATION: 95 % | WEIGHT: 175 LBS | SYSTOLIC BLOOD PRESSURE: 136 MMHG | RESPIRATION RATE: 18 BRPM | DIASTOLIC BLOOD PRESSURE: 84 MMHG | BODY MASS INDEX: 23.7 KG/M2 | HEART RATE: 73 BPM

## 2020-12-20 DIAGNOSIS — K64.4 BLEEDING EXTERNAL HEMORRHOIDS: Primary | ICD-10-CM

## 2020-12-20 DIAGNOSIS — K92.2 LOWER GI BLEED: ICD-10-CM

## 2020-12-20 LAB
ABO + RH BLD: NORMAL
ALBUMIN SERPL BCP-MCNC: 3.6 G/DL (ref 3.5–5.2)
ALP SERPL-CCNC: 55 U/L (ref 55–135)
ALT SERPL W/O P-5'-P-CCNC: 12 U/L (ref 10–44)
ANION GAP SERPL CALC-SCNC: 7 MMOL/L (ref 8–16)
APTT PPP: 29.2 SEC (ref 23.6–33.3)
AST SERPL-CCNC: 17 U/L (ref 10–40)
BASOPHILS # BLD AUTO: 0.04 K/UL (ref 0–0.2)
BASOPHILS NFR BLD: 0.6 % (ref 0–1.9)
BILIRUB SERPL-MCNC: 1.3 MG/DL (ref 0.1–1)
BLD GP AB SCN CELLS X3 SERPL QL: NORMAL
BUN SERPL-MCNC: 22 MG/DL (ref 8–23)
CALCIUM SERPL-MCNC: 9 MG/DL (ref 8.7–10.5)
CHLORIDE SERPL-SCNC: 110 MMOL/L (ref 95–110)
CO2 SERPL-SCNC: 23 MMOL/L (ref 23–29)
CREAT SERPL-MCNC: 0.8 MG/DL (ref 0.5–1.4)
DIFFERENTIAL METHOD: ABNORMAL
EOSINOPHIL # BLD AUTO: 0.1 K/UL (ref 0–0.5)
EOSINOPHIL NFR BLD: 1.9 % (ref 0–8)
ERYTHROCYTE [DISTWIDTH] IN BLOOD BY AUTOMATED COUNT: 13.6 % (ref 11.5–14.5)
EST. GFR  (AFRICAN AMERICAN): >60 ML/MIN/1.73 M^2
EST. GFR  (NON AFRICAN AMERICAN): >60 ML/MIN/1.73 M^2
GLUCOSE SERPL-MCNC: 92 MG/DL (ref 70–110)
HCT VFR BLD AUTO: 44.8 % (ref 40–54)
HGB BLD-MCNC: 14.7 G/DL (ref 14–18)
IMM GRANULOCYTES # BLD AUTO: 0.05 K/UL (ref 0–0.04)
IMM GRANULOCYTES NFR BLD AUTO: 0.7 % (ref 0–0.5)
INR PPP: 1.2
LYMPHOCYTES # BLD AUTO: 1.9 K/UL (ref 1–4.8)
LYMPHOCYTES NFR BLD: 26.4 % (ref 18–48)
MCH RBC QN AUTO: 32.7 PG (ref 27–31)
MCHC RBC AUTO-ENTMCNC: 32.8 G/DL (ref 32–36)
MCV RBC AUTO: 100 FL (ref 82–98)
MONOCYTES # BLD AUTO: 0.7 K/UL (ref 0.3–1)
MONOCYTES NFR BLD: 9.7 % (ref 4–15)
NEUTROPHILS # BLD AUTO: 4.4 K/UL (ref 1.8–7.7)
NEUTROPHILS NFR BLD: 60.7 % (ref 38–73)
NRBC BLD-RTO: 0 /100 WBC
PLATELET # BLD AUTO: 200 K/UL (ref 150–350)
PMV BLD AUTO: 10.1 FL (ref 9.2–12.9)
POTASSIUM SERPL-SCNC: 3.7 MMOL/L (ref 3.5–5.1)
PROT SERPL-MCNC: 5.9 G/DL (ref 6–8.4)
PROTHROMBIN TIME: 14.2 SEC (ref 10.6–14.8)
RBC # BLD AUTO: 4.49 M/UL (ref 4.6–6.2)
SODIUM SERPL-SCNC: 140 MMOL/L (ref 136–145)
WBC # BLD AUTO: 7.23 K/UL (ref 3.9–12.7)

## 2020-12-20 PROCEDURE — 85610 PROTHROMBIN TIME: CPT

## 2020-12-20 PROCEDURE — 85730 THROMBOPLASTIN TIME PARTIAL: CPT

## 2020-12-20 PROCEDURE — 86901 BLOOD TYPING SEROLOGIC RH(D): CPT

## 2020-12-20 PROCEDURE — 85025 COMPLETE CBC W/AUTO DIFF WBC: CPT

## 2020-12-20 PROCEDURE — 80053 COMPREHEN METABOLIC PANEL: CPT

## 2020-12-20 PROCEDURE — 99283 EMERGENCY DEPT VISIT LOW MDM: CPT | Mod: 25

## 2020-12-20 RX ORDER — HYDROCORTISONE 25 MG/G
CREAM TOPICAL 2 TIMES DAILY
Qty: 1 TUBE | Refills: 0 | OUTPATIENT
Start: 2020-12-20 | End: 2021-07-14

## 2020-12-20 RX ORDER — POLYETHYLENE GLYCOL 3350 17 G/17G
17 POWDER, FOR SOLUTION ORAL DAILY
Qty: 30 EACH | Refills: 0 | Status: SHIPPED | OUTPATIENT
Start: 2020-12-20

## 2020-12-21 NOTE — DISCHARGE INSTRUCTIONS
RETURN TO EMERGENCY DEPARTMENT WITHOUT FAIL, IF YOUR SYMPTOMS WORSEN, IF YOU GET NEW OR DIFFERENT SYMPTOMS, IF YOU ARE UNABLE TO FOLLOW UP AS DIRECTED, OR IF YOU HAVE ANY CONCERNS OR WORRIES.    Use the hemorrhoid cream as directed.  High-fiber diet.  Take MiraLax twice daily.  Increase your water intake. Follow up with GI for a scope.

## 2020-12-21 NOTE — ED PROVIDER NOTES
Encounter Date: 12/20/2020       History     Chief Complaint   Patient presents with    Rectal Bleeding     started today dark stool     This is a 85-year-old male who presents emergency department with rectal bleeding.  Says he is not on any blood thinners has not recently taken any over-the-counter ibuprofen or aspirin.  Says that earlier today just prior to arrival he had 1 episode of maroonish colored stool and is also seem to be bright red.  He does admit to being occasionally constipated and takes prunes for this.  Says he has not been constipated recently.  He denies any abdominal pain.  Rectal bleeding is painless.  Says he has not had any issues like this in the past.  He has never had a colonoscopy before.        Review of patient's allergies indicates:   Allergen Reactions    No known drug allergies      Past Medical History:   Diagnosis Date    BPH (benign prostatic hypertrophy)     Scrotal mass     Wears glasses      Past Surgical History:   Procedure Laterality Date    EYE SURGERY  BILAT CATARACT WITH LENS    PROSTATE SURGERY      TONSILLECTOMY       Family History   Problem Relation Age of Onset    Prostate cancer Neg Hx     Urolithiasis Neg Hx     Kidney cancer Neg Hx      Social History     Tobacco Use    Smoking status: Never Smoker    Smokeless tobacco: Never Used   Substance Use Topics    Alcohol use: No    Drug use: No     Review of Systems   Constitutional: Negative for chills and fever.   HENT: Negative for sore throat.    Respiratory: Negative for shortness of breath.    Cardiovascular: Negative for chest pain.   Gastrointestinal: Positive for anal bleeding, blood in stool and constipation. Negative for nausea and vomiting.   Genitourinary: Negative for dysuria.   Musculoskeletal: Negative for back pain.   Skin: Negative for rash.   Neurological: Negative for syncope and weakness.   Hematological: Does not bruise/bleed easily.   All other systems reviewed and are  negative.      Physical Exam     Initial Vitals [12/20/20 1727]   BP Pulse Resp Temp SpO2   (!) 147/82 88 18 98.3 °F (36.8 °C) 95 %      MAP       --         Physical Exam    Nursing note and vitals reviewed.  Constitutional: He appears well-developed and well-nourished. He is not diaphoretic. No distress.   HENT:   Head: Normocephalic and atraumatic.   Mouth/Throat: Oropharynx is clear and moist. No oropharyngeal exudate.   Eyes: Conjunctivae and EOM are normal. Pupils are equal, round, and reactive to light.   Neck: No tracheal deviation present.   Cardiovascular: Normal rate, regular rhythm, normal heart sounds and intact distal pulses.   No murmur heard.  Pulmonary/Chest: Breath sounds normal. No stridor. No respiratory distress. He has no wheezes. He has no rhonchi. He has no rales.   Abdominal: Soft. Bowel sounds are normal. He exhibits no distension. There is no abdominal tenderness. There is no rebound and no guarding.   Genitourinary:    Genitourinary Comments: Rectal exam chaperoned by neck RN:  There is an external hemorrhoid on the right side that is actively bleeding.  There is some tenderness to palpation.  On digital exam there is bright red blood past hemorrhoid in the rectal vault.  No tenderness.  No masses.     Musculoskeletal: Normal range of motion. No tenderness or edema.   Neurological: He is alert and oriented to person, place, and time. He has normal strength and normal reflexes. No cranial nerve deficit or sensory deficit.   Skin: Skin is warm and dry. Capillary refill takes less than 2 seconds. No rash noted. No erythema. No pallor.   Psychiatric: He has a normal mood and affect. His behavior is normal. Thought content normal.         ED Course   Procedures  Labs Reviewed   CBC W/ AUTO DIFFERENTIAL - Abnormal; Notable for the following components:       Result Value    RBC 4.49 (*)      (*)     MCH 32.7 (*)     Immature Granulocytes 0.7 (*)     Immature Grans (Abs) 0.05 (*)     All  other components within normal limits   COMPREHENSIVE METABOLIC PANEL - Abnormal; Notable for the following components:    Total Protein 5.9 (*)     Total Bilirubin 1.3 (*)     Anion Gap 7 (*)     All other components within normal limits   APTT   PROTIME-INR   TYPE & SCREEN           Results for orders placed or performed during the hospital encounter of 12/20/20   APTT   Result Value Ref Range    aPTT 29.2 23.6 - 33.3 sec   Protime-INR   Result Value Ref Range    PT 14.2 10.6 - 14.8 sec    INR 1.2    CBC auto differential   Result Value Ref Range    WBC 7.23 3.90 - 12.70 K/uL    RBC 4.49 (L) 4.60 - 6.20 M/uL    Hemoglobin 14.7 14.0 - 18.0 g/dL    Hematocrit 44.8 40.0 - 54.0 %     (H) 82 - 98 fL    MCH 32.7 (H) 27.0 - 31.0 pg    MCHC 32.8 32.0 - 36.0 g/dL    RDW 13.6 11.5 - 14.5 %    Platelets 200 150 - 350 K/uL    MPV 10.1 9.2 - 12.9 fL    Immature Granulocytes 0.7 (H) 0.0 - 0.5 %    Gran # (ANC) 4.4 1.8 - 7.7 K/uL    Immature Grans (Abs) 0.05 (H) 0.00 - 0.04 K/uL    Lymph # 1.9 1.0 - 4.8 K/uL    Mono # 0.7 0.3 - 1.0 K/uL    Eos # 0.1 0.0 - 0.5 K/uL    Baso # 0.04 0.00 - 0.20 K/uL    nRBC 0 0 /100 WBC    Gran % 60.7 38.0 - 73.0 %    Lymph % 26.4 18.0 - 48.0 %    Mono % 9.7 4.0 - 15.0 %    Eosinophil % 1.9 0.0 - 8.0 %    Basophil % 0.6 0.0 - 1.9 %    Differential Method Automated    Comprehensive metabolic panel   Result Value Ref Range    Sodium 140 136 - 145 mmol/L    Potassium 3.7 3.5 - 5.1 mmol/L    Chloride 110 95 - 110 mmol/L    CO2 23 23 - 29 mmol/L    Glucose 92 70 - 110 mg/dL    BUN 22 8 - 23 mg/dL    Creatinine 0.8 0.5 - 1.4 mg/dL    Calcium 9.0 8.7 - 10.5 mg/dL    Total Protein 5.9 (L) 6.0 - 8.4 g/dL    Albumin 3.6 3.5 - 5.2 g/dL    Total Bilirubin 1.3 (H) 0.1 - 1.0 mg/dL    Alkaline Phosphatase 55 55 - 135 U/L    AST 17 10 - 40 U/L    ALT 12 10 - 44 U/L    Anion Gap 7 (L) 8 - 16 mmol/L    eGFR if African American >60.0 >60 mL/min/1.73 m^2    eGFR if non African American >60.0 >60 mL/min/1.73 m^2    Type & Screen   Result Value Ref Range    Group & Rh B POS     Indirect Michelle NEG      No orders to display       Imaging Results    None          Medical Decision Making:   ED Management:  Patient presents emergency department with concern for bleeding from his rectum.  On his exam he has evidence of a external hemorrhoid which is actually bleeding.  On rectal exam higher up with does have some bright red blood per rectum.  May have some internal hemorrhoids as well.  May have diverticulosis, as he has never had a colonoscopy by.  He does not have any abdominal pain so less suspicious for colitis.  He is hemodynamically stable he is not hypotensive.  He is not tachycardic.  He does not have any lightheadedness upon standing.  Hemoglobin is 14.7.  Because of this, I believe it is safe to discharge patient home.  He knows he needs a colonoscopy from a gastroenterologist. I did offer to admit the patient for observation but he and his brother preferred to go home and return if patient's symptoms change.    I had a detailed discussion with the patient and/or guardian regarding: The historical points, exam findings, and diagnostic results supporting the discharge diagnosis, lab results, pertinent radiology results, and the need for outpatient follow-up, for definitive care with a family practitioner and to return to the emergency department if symptoms worsen or persist or if there are any questions or concerns that arise at home. All questions have been answered in detail. Strict return to Emergency Department precautions have been provided.    A dictation software program was used for this note.  Please expect some simple typographical  errors in this note.                    ED Course as of Dec 20 2007   Sun Dec 20, 2020   1937 I re-evaluated the patient.  No further bleeding noted.  Offer to admit the patient for observation verses him go home and set up an outpatient colonoscopy.  He is brother preferred to go  home.    [JR]      ED Course User Index  [JR] Rivera Wise DO            Clinical Impression:     ICD-10-CM ICD-9-CM   1. Bleeding external hemorrhoids  K64.4 455.5   2. Lower GI bleed  K92.2 578.9                          ED Disposition Condition    Discharge Stable        ED Prescriptions     Medication Sig Dispense Start Date End Date Auth. Provider    hydrocortisone 2.5 % cream Apply topically 2 (two) times daily. 1 Tube 12/20/2020  Rivera Wise DO    polyethylene glycol (GLYCOLAX) 17 gram PwPk Take 17 g by mouth once daily. 30 each 12/20/2020  Rivera Wise DO        Follow-up Information     Follow up With Specialties Details Why Contact Info Additional Information    Cali Win Jr., MD Family Medicine In 3 days  [address] [facility] [address]  [phone]       [facility] Emergency [facility]  If symptoms worsen [address] [facility] [address] [address]  [phone] 1st floor    Lm Guzman MD Gastroenterology In 3 days  [address] [facility] [address]  [phone]                                          Rivera Wise DO  12/20/20 2010

## 2021-06-22 ENCOUNTER — OFFICE VISIT (OUTPATIENT)
Dept: PODIATRY | Facility: CLINIC | Age: 86
End: 2021-06-22
Payer: MEDICARE

## 2021-06-22 VITALS — BODY MASS INDEX: 25.87 KG/M2 | OXYGEN SATURATION: 95 % | RESPIRATION RATE: 18 BRPM | WEIGHT: 191 LBS | HEIGHT: 72 IN

## 2021-06-22 DIAGNOSIS — L60.2 OG (ONYCHOGRYPHOSIS): Primary | ICD-10-CM

## 2021-06-22 DIAGNOSIS — B35.1 ONYCHOMYCOSIS DUE TO DERMATOPHYTE: ICD-10-CM

## 2021-06-22 DIAGNOSIS — I70.213 ATHEROSCLER OF NATIVE ARTERY OF BOTH LEGS WITH INTERMIT CLAUDICATION: ICD-10-CM

## 2021-06-22 PROCEDURE — 99203 OFFICE O/P NEW LOW 30 MIN: CPT | Mod: 25,S$GLB,, | Performed by: PODIATRIST

## 2021-06-22 PROCEDURE — 99203 PR OFFICE/OUTPT VISIT, NEW, LEVL III, 30-44 MIN: ICD-10-PCS | Mod: 25,S$GLB,, | Performed by: PODIATRIST

## 2021-06-22 PROCEDURE — 11721 PR DEBRIDEMENT OF NAILS, 6 OR MORE: ICD-10-PCS | Mod: Q8,S$GLB,, | Performed by: PODIATRIST

## 2021-06-22 PROCEDURE — 11721 DEBRIDE NAIL 6 OR MORE: CPT | Mod: Q8,S$GLB,, | Performed by: PODIATRIST

## 2021-06-22 RX ORDER — DOCUSATE SODIUM 100 MG/1
CAPSULE, LIQUID FILLED ORAL
COMMUNITY

## 2021-07-14 ENCOUNTER — HOSPITAL ENCOUNTER (EMERGENCY)
Facility: HOSPITAL | Age: 86
Discharge: HOME | End: 2021-07-14
Attending: EMERGENCY MEDICINE
Payer: MEDICARE

## 2021-07-14 VITALS
TEMPERATURE: 98 F | WEIGHT: 210 LBS | DIASTOLIC BLOOD PRESSURE: 76 MMHG | BODY MASS INDEX: 28.48 KG/M2 | RESPIRATION RATE: 15 BRPM | SYSTOLIC BLOOD PRESSURE: 146 MMHG | HEART RATE: 75 BPM | OXYGEN SATURATION: 95 %

## 2021-07-14 DIAGNOSIS — K62.5 RECTAL BLEEDING: Primary | ICD-10-CM

## 2021-07-14 DIAGNOSIS — K64.1 GRADE II HEMORRHOIDS: ICD-10-CM

## 2021-07-14 LAB
ABO + RH BLD: NORMAL
ALBUMIN SERPL BCP-MCNC: 3.5 G/DL (ref 3.5–5.2)
ALP SERPL-CCNC: 50 U/L (ref 55–135)
ALT SERPL W/O P-5'-P-CCNC: 13 U/L (ref 10–44)
ANION GAP SERPL CALC-SCNC: 8 MMOL/L (ref 8–16)
APTT PPP: 28.6 SEC (ref 25.6–35.8)
AST SERPL-CCNC: 16 U/L (ref 10–40)
BASOPHILS # BLD AUTO: 0.03 K/UL (ref 0–0.2)
BASOPHILS NFR BLD: 0.4 % (ref 0–1.9)
BILIRUB SERPL-MCNC: 2.1 MG/DL (ref 0.1–1)
BLD GP AB SCN CELLS X3 SERPL QL: NORMAL
BUN SERPL-MCNC: 17 MG/DL (ref 8–23)
CALCIUM SERPL-MCNC: 9 MG/DL (ref 8.7–10.5)
CHLORIDE SERPL-SCNC: 106 MMOL/L (ref 95–110)
CO2 SERPL-SCNC: 24 MMOL/L (ref 23–29)
CREAT SERPL-MCNC: 0.8 MG/DL (ref 0.5–1.4)
DIFFERENTIAL METHOD: ABNORMAL
EOSINOPHIL # BLD AUTO: 0.1 K/UL (ref 0–0.5)
EOSINOPHIL NFR BLD: 1.7 % (ref 0–8)
ERYTHROCYTE [DISTWIDTH] IN BLOOD BY AUTOMATED COUNT: 13.8 % (ref 11.5–14.5)
EST. GFR  (AFRICAN AMERICAN): >60 ML/MIN/1.73 M^2
EST. GFR  (NON AFRICAN AMERICAN): >60 ML/MIN/1.73 M^2
GLUCOSE SERPL-MCNC: 82 MG/DL (ref 70–110)
HCT VFR BLD AUTO: 41.4 % (ref 40–54)
HGB BLD-MCNC: 14.1 G/DL (ref 14–18)
IMM GRANULOCYTES # BLD AUTO: 0.05 K/UL (ref 0–0.04)
IMM GRANULOCYTES NFR BLD AUTO: 0.7 % (ref 0–0.5)
INR PPP: 1.2
LYMPHOCYTES # BLD AUTO: 1.6 K/UL (ref 1–4.8)
LYMPHOCYTES NFR BLD: 23.5 % (ref 18–48)
MCH RBC QN AUTO: 34 PG (ref 27–31)
MCHC RBC AUTO-ENTMCNC: 34.1 G/DL (ref 32–36)
MCV RBC AUTO: 100 FL (ref 82–98)
MONOCYTES # BLD AUTO: 0.6 K/UL (ref 0.3–1)
MONOCYTES NFR BLD: 8.7 % (ref 4–15)
NEUTROPHILS # BLD AUTO: 4.5 K/UL (ref 1.8–7.7)
NEUTROPHILS NFR BLD: 65 % (ref 38–73)
NRBC BLD-RTO: 0 /100 WBC
OB PNL STL: POSITIVE
PLATELET # BLD AUTO: 184 K/UL (ref 150–450)
PMV BLD AUTO: 10.1 FL (ref 9.2–12.9)
POTASSIUM SERPL-SCNC: 3.7 MMOL/L (ref 3.5–5.1)
PROT SERPL-MCNC: 5.8 G/DL (ref 6–8.4)
PROTHROMBIN TIME: 14.5 SEC (ref 11.8–14.3)
RBC # BLD AUTO: 4.15 M/UL (ref 4.6–6.2)
SODIUM SERPL-SCNC: 138 MMOL/L (ref 136–145)
WBC # BLD AUTO: 6.93 K/UL (ref 3.9–12.7)

## 2021-07-14 PROCEDURE — 80053 COMPREHEN METABOLIC PANEL: CPT | Performed by: EMERGENCY MEDICINE

## 2021-07-14 PROCEDURE — 85610 PROTHROMBIN TIME: CPT | Performed by: EMERGENCY MEDICINE

## 2021-07-14 PROCEDURE — 82272 OCCULT BLD FECES 1-3 TESTS: CPT | Performed by: EMERGENCY MEDICINE

## 2021-07-14 PROCEDURE — 25000003 PHARM REV CODE 250: Performed by: EMERGENCY MEDICINE

## 2021-07-14 PROCEDURE — 85730 THROMBOPLASTIN TIME PARTIAL: CPT | Performed by: EMERGENCY MEDICINE

## 2021-07-14 PROCEDURE — 85025 COMPLETE CBC W/AUTO DIFF WBC: CPT | Performed by: EMERGENCY MEDICINE

## 2021-07-14 PROCEDURE — 99283 EMERGENCY DEPT VISIT LOW MDM: CPT

## 2021-07-14 PROCEDURE — 86900 BLOOD TYPING SEROLOGIC ABO: CPT | Performed by: EMERGENCY MEDICINE

## 2021-07-14 RX ORDER — HYDROCORTISONE 25 MG/G
CREAM TOPICAL 2 TIMES DAILY
Status: DISCONTINUED | OUTPATIENT
Start: 2021-07-14 | End: 2021-07-14

## 2021-07-14 RX ORDER — HYDROCORTISONE 25 MG/G
CREAM TOPICAL
Status: COMPLETED | OUTPATIENT
Start: 2021-07-14 | End: 2021-07-14

## 2021-07-14 RX ORDER — HYDROCORTISONE 25 MG/G
CREAM TOPICAL 2 TIMES DAILY
Qty: 1 TUBE | Refills: 1 | Status: SHIPPED | OUTPATIENT
Start: 2021-07-14

## 2021-07-14 RX ADMIN — HYDROCORTISONE: 25 CREAM TOPICAL at 05:07

## 2021-08-18 ENCOUNTER — PATIENT OUTREACH (OUTPATIENT)
Dept: ADMINISTRATIVE | Facility: HOSPITAL | Age: 86
End: 2021-08-18

## 2024-05-18 ENCOUNTER — CLINICAL SUPPORT (OUTPATIENT)
Dept: CARDIOLOGY | Facility: HOSPITAL | Age: 89
DRG: 871 | End: 2024-05-18
Attending: EMERGENCY MEDICINE
Payer: MEDICARE

## 2024-05-18 ENCOUNTER — HOSPITAL ENCOUNTER (INPATIENT)
Facility: HOSPITAL | Age: 89
LOS: 1 days | Discharge: HOME | DRG: 871 | End: 2024-05-21
Attending: EMERGENCY MEDICINE | Admitting: HOSPITALIST
Payer: MEDICARE

## 2024-05-18 VITALS — WEIGHT: 180 LBS | BODY MASS INDEX: 23.86 KG/M2 | HEIGHT: 73 IN

## 2024-05-18 DIAGNOSIS — I49.9 ARRHYTHMIA: ICD-10-CM

## 2024-05-18 DIAGNOSIS — I95.9 HYPOTENSION, UNSPECIFIED HYPOTENSION TYPE: ICD-10-CM

## 2024-05-18 DIAGNOSIS — R00.0 TACHYCARDIA: ICD-10-CM

## 2024-05-18 DIAGNOSIS — E86.0 DEHYDRATION: Primary | ICD-10-CM

## 2024-05-18 DIAGNOSIS — R07.9 CHEST PAIN: ICD-10-CM

## 2024-05-18 DIAGNOSIS — R00.1 SYMPTOMATIC BRADYCARDIA: ICD-10-CM

## 2024-05-18 DIAGNOSIS — R79.89 ELEVATED TROPONIN: ICD-10-CM

## 2024-05-18 DIAGNOSIS — I45.9: ICD-10-CM

## 2024-05-18 DIAGNOSIS — J18.9 ATYPICAL PNEUMONIA: ICD-10-CM

## 2024-05-18 DIAGNOSIS — R00.1 BRADYCARDIA: ICD-10-CM

## 2024-05-18 PROBLEM — R17 ELEVATED BILIRUBIN: Status: ACTIVE | Noted: 2024-05-18

## 2024-05-18 PROBLEM — D75.89 MACROCYTOSIS: Status: ACTIVE | Noted: 2024-05-18

## 2024-05-18 PROBLEM — A41.9 SEPSIS: Status: ACTIVE | Noted: 2024-05-18

## 2024-05-18 PROBLEM — G93.41 ENCEPHALOPATHY, METABOLIC: Status: ACTIVE | Noted: 2024-05-18

## 2024-05-18 PROBLEM — N40.0 BPH (BENIGN PROSTATIC HYPERPLASIA): Status: ACTIVE | Noted: 2024-05-18

## 2024-05-18 LAB
ALBUMIN SERPL BCP-MCNC: 3.9 G/DL (ref 3.5–5.2)
ALP SERPL-CCNC: 54 U/L (ref 55–135)
ALT SERPL W/O P-5'-P-CCNC: 11 U/L (ref 10–44)
ANION GAP SERPL CALC-SCNC: 11 MMOL/L (ref 8–16)
AORTIC ROOT ANNULUS: 4.5 CM
AORTIC VALVE CUSP SEPERATION: 2.3 CM
AST SERPL-CCNC: 15 U/L (ref 10–40)
AV INDEX (PROSTH): 0.73
AV MEAN GRADIENT: 5 MMHG
AV PEAK GRADIENT: 9 MMHG
AV REGURGITATION PRESSURE HALF TIME: 666 MS
AV VALVE AREA BY VELOCITY RATIO: 2.49 CM²
AV VALVE AREA: 2.29 CM²
AV VELOCITY RATIO: 0.79
BASOPHILS # BLD AUTO: 0.02 K/UL (ref 0–0.2)
BASOPHILS NFR BLD: 0.1 % (ref 0–1.9)
BILIRUB SERPL-MCNC: 2.9 MG/DL (ref 0.1–1)
BILIRUB UR QL STRIP: NEGATIVE
BNP SERPL-MCNC: 260 PG/ML (ref 0–99)
BSA FOR ECHO PROCEDURE: 2.05 M2
BUN SERPL-MCNC: 28 MG/DL (ref 8–23)
CALCIUM SERPL-MCNC: 8.4 MG/DL (ref 8.7–10.5)
CHLORIDE SERPL-SCNC: 104 MMOL/L (ref 95–110)
CLARITY UR: CLEAR
CO2 SERPL-SCNC: 22 MMOL/L (ref 23–29)
COLOR UR: YELLOW
CREAT SERPL-MCNC: 1.2 MG/DL (ref 0.5–1.4)
CREAT SERPL-MCNC: 1.2 MG/DL (ref 0.5–1.4)
CV ECHO LV RWT: 0.31 CM
DIFFERENTIAL METHOD BLD: ABNORMAL
DOP CALC AO PEAK VEL: 1.49 M/S
DOP CALC AO VTI: 33.8 CM
DOP CALC LVOT AREA: 3.1 CM2
DOP CALC LVOT DIAMETER: 2 CM
DOP CALC LVOT PEAK VEL: 1.18 M/S
DOP CALC LVOT STROKE VOLUME: 77.24 CM3
DOP CALC MV VTI: 26 CM
DOP CALCLVOT PEAK VEL VTI: 24.6 CM
E WAVE DECELERATION TIME: 236 MSEC
E/A RATIO: 0.69
E/E' RATIO: 7.63 M/S
ECHO LV POSTERIOR WALL: 0.8 CM (ref 0.6–1.1)
EOSINOPHIL # BLD AUTO: 0 K/UL (ref 0–0.5)
EOSINOPHIL NFR BLD: 0 % (ref 0–8)
ERYTHROCYTE [DISTWIDTH] IN BLOOD BY AUTOMATED COUNT: 14.4 % (ref 11.5–14.5)
EST. GFR  (NO RACE VARIABLE): 58.2 ML/MIN/1.73 M^2
ETHANOL SERPL-MCNC: <10 MG/DL
FOLATE SERPL-MCNC: 7 NG/ML (ref 4–24)
FRACTIONAL SHORTENING: 27 % (ref 28–44)
GLUCOSE SERPL-MCNC: 138 MG/DL (ref 70–110)
GLUCOSE SERPL-MCNC: 141 MG/DL (ref 70–110)
GLUCOSE SERPL-MCNC: 94 MG/DL (ref 70–110)
GLUCOSE UR QL STRIP: NEGATIVE
HCT VFR BLD AUTO: 45.8 % (ref 40–54)
HGB BLD-MCNC: 15.4 G/DL (ref 14–18)
HGB UR QL STRIP: NEGATIVE
IMM GRANULOCYTES # BLD AUTO: 0.06 K/UL (ref 0–0.04)
IMM GRANULOCYTES NFR BLD AUTO: 0.4 % (ref 0–0.5)
INFLUENZA A, MOLECULAR: NEGATIVE
INFLUENZA B, MOLECULAR: NEGATIVE
INR PPP: 1.1 (ref 0.8–1.2)
INTERVENTRICULAR SEPTUM: 1.1 CM (ref 0.6–1.1)
IVC DIAMETER: 1.8 CM
KETONES UR QL STRIP: ABNORMAL
LACTATE SERPL-SCNC: 2.5 MMOL/L (ref 0.5–1.9)
LACTATE SERPL-SCNC: 2.8 MMOL/L (ref 0.5–1.9)
LACTATE SERPL-SCNC: 2.9 MMOL/L (ref 0.5–1.9)
LEFT INTERNAL DIMENSION IN SYSTOLE: 3.8 CM (ref 2.1–4)
LEFT VENTRICLE DIASTOLIC VOLUME INDEX: 62.87 ML/M2
LEFT VENTRICLE DIASTOLIC VOLUME: 129.51 ML
LEFT VENTRICLE MASS INDEX: 88 G/M2
LEFT VENTRICLE SYSTOLIC VOLUME INDEX: 30.1 ML/M2
LEFT VENTRICLE SYSTOLIC VOLUME: 61.95 ML
LEFT VENTRICULAR INTERNAL DIMENSION IN DIASTOLE: 5.2 CM (ref 3.5–6)
LEFT VENTRICULAR MASS: 181.4 G
LEUKOCYTE ESTERASE UR QL STRIP: NEGATIVE
LIPASE SERPL-CCNC: 22 U/L (ref 4–60)
LV LATERAL E/E' RATIO: 6.78 M/S
LV SEPTAL E/E' RATIO: 8.71 M/S
LVOT MG: 3 MMHG
LVOT MV: 0.77 CM/S
LYMPHOCYTES # BLD AUTO: 0.8 K/UL (ref 1–4.8)
LYMPHOCYTES NFR BLD: 5.4 % (ref 18–48)
MAGNESIUM SERPL-MCNC: 1.8 MG/DL (ref 1.6–2.6)
MCH RBC QN AUTO: 33.5 PG (ref 27–31)
MCHC RBC AUTO-ENTMCNC: 33.6 G/DL (ref 32–36)
MCV RBC AUTO: 100 FL (ref 82–98)
MONOCYTES # BLD AUTO: 0.5 K/UL (ref 0.3–1)
MONOCYTES NFR BLD: 3.6 % (ref 4–15)
MV MEAN GRADIENT: 3 MMHG
MV PEAK A VEL: 0.89 M/S
MV PEAK E VEL: 0.61 M/S
MV PEAK GRADIENT: 6 MMHG
MV STENOSIS PRESSURE HALF TIME: 45 MS
MV VALVE AREA BY CONTINUITY EQUATION: 2.97 CM2
MV VALVE AREA P 1/2 METHOD: 4.89 CM2
NEUTROPHILS # BLD AUTO: 12.6 K/UL (ref 1.8–7.7)
NEUTROPHILS NFR BLD: 90.5 % (ref 38–73)
NITRITE UR QL STRIP: NEGATIVE
NRBC BLD-RTO: 0 /100 WBC
PH UR STRIP: 6 [PH] (ref 5–8)
PISA AR MAX VEL: 4.57 M/S
PISA MRMAX VEL: 3.55 M/S
PISA TR MAX VEL: 2.09 M/S
PLATELET # BLD AUTO: 195 K/UL (ref 150–450)
PMV BLD AUTO: 10.6 FL (ref 9.2–12.9)
POTASSIUM SERPL-SCNC: 3.7 MMOL/L (ref 3.5–5.1)
PROCALCITONIN SERPL IA-MCNC: 0.51 NG/ML (ref 0–0.5)
PROT SERPL-MCNC: 6.1 G/DL (ref 6–8.4)
PROT UR QL STRIP: ABNORMAL
PROTHROMBIN TIME: 12.5 SEC (ref 9–12.5)
PV MV: 0.7 M/S
PV PEAK GRADIENT: 4 MMHG
PV PEAK VELOCITY: 1.06 M/S
RBC # BLD AUTO: 4.6 M/UL (ref 4.6–6.2)
RV TISSUE DOPPLER FREE WALL SYSTOLIC VELOCITY 1 (APICAL 4 CHAMBER VIEW): 16.2 CM/S
SAMPLE: NORMAL
SARS-COV-2 RDRP RESP QL NAA+PROBE: NEGATIVE
SODIUM SERPL-SCNC: 137 MMOL/L (ref 136–145)
SP GR UR STRIP: >1.03 (ref 1–1.03)
SPECIMEN SOURCE: NORMAL
TDI LATERAL: 0.09 M/S
TDI SEPTAL: 0.07 M/S
TDI: 0.08 M/S
TR MAX PG: 17 MMHG
TRICUSPID ANNULAR PLANE SYSTOLIC EXCURSION: 1.74 CM
TROPONIN I SERPL HS-MCNC: 11.8 PG/ML (ref 0–14.9)
TROPONIN I SERPL HS-MCNC: 17.3 PG/ML (ref 0–14.9)
TSH SERPL DL<=0.005 MIU/L-ACNC: 0.94 UIU/ML (ref 0.34–5.6)
URN SPEC COLLECT METH UR: ABNORMAL
UROBILINOGEN UR STRIP-ACNC: ABNORMAL EU/DL
VIT B12 SERPL-MCNC: 199 PG/ML (ref 210–950)
WBC # BLD AUTO: 13.95 K/UL (ref 3.9–12.7)
Z-SCORE OF LEFT VENTRICULAR DIMENSION IN END DIASTOLE: -1.79
Z-SCORE OF LEFT VENTRICULAR DIMENSION IN END SYSTOLE: -0.02

## 2024-05-18 PROCEDURE — 87502 INFLUENZA DNA AMP PROBE: CPT | Performed by: EMERGENCY MEDICINE

## 2024-05-18 PROCEDURE — 83605 ASSAY OF LACTIC ACID: CPT | Mod: 91 | Performed by: EMERGENCY MEDICINE

## 2024-05-18 PROCEDURE — 93010 ELECTROCARDIOGRAM REPORT: CPT | Mod: 76,,, | Performed by: INTERNAL MEDICINE

## 2024-05-18 PROCEDURE — 93306 TTE W/DOPPLER COMPLETE: CPT | Mod: 26,,, | Performed by: INTERNAL MEDICINE

## 2024-05-18 PROCEDURE — 82077 ASSAY SPEC XCP UR&BREATH IA: CPT | Performed by: NURSE PRACTITIONER

## 2024-05-18 PROCEDURE — U0002 COVID-19 LAB TEST NON-CDC: HCPCS | Performed by: EMERGENCY MEDICINE

## 2024-05-18 PROCEDURE — 93010 ELECTROCARDIOGRAM REPORT: CPT | Mod: ,,, | Performed by: INTERNAL MEDICINE

## 2024-05-18 PROCEDURE — 99900031 HC PATIENT EDUCATION (STAT)

## 2024-05-18 PROCEDURE — 93005 ELECTROCARDIOGRAM TRACING: CPT | Performed by: INTERNAL MEDICINE

## 2024-05-18 PROCEDURE — 85025 COMPLETE CBC W/AUTO DIFF WBC: CPT | Performed by: EMERGENCY MEDICINE

## 2024-05-18 PROCEDURE — 63600175 PHARM REV CODE 636 W HCPCS: Performed by: EMERGENCY MEDICINE

## 2024-05-18 PROCEDURE — 96361 HYDRATE IV INFUSION ADD-ON: CPT

## 2024-05-18 PROCEDURE — 25000003 PHARM REV CODE 250: Performed by: NURSE PRACTITIONER

## 2024-05-18 PROCEDURE — 84145 PROCALCITONIN (PCT): CPT | Performed by: EMERGENCY MEDICINE

## 2024-05-18 PROCEDURE — 83605 ASSAY OF LACTIC ACID: CPT | Mod: 91 | Performed by: NURSE PRACTITIONER

## 2024-05-18 PROCEDURE — 99285 EMERGENCY DEPT VISIT HI MDM: CPT | Mod: 25

## 2024-05-18 PROCEDURE — 81003 URINALYSIS AUTO W/O SCOPE: CPT | Performed by: EMERGENCY MEDICINE

## 2024-05-18 PROCEDURE — 82607 VITAMIN B-12: CPT | Performed by: NURSE PRACTITIONER

## 2024-05-18 PROCEDURE — 25000242 PHARM REV CODE 250 ALT 637 W/ HCPCS: Performed by: NURSE PRACTITIONER

## 2024-05-18 PROCEDURE — 80053 COMPREHEN METABOLIC PANEL: CPT | Performed by: EMERGENCY MEDICINE

## 2024-05-18 PROCEDURE — 83880 ASSAY OF NATRIURETIC PEPTIDE: CPT | Performed by: EMERGENCY MEDICINE

## 2024-05-18 PROCEDURE — 25000003 PHARM REV CODE 250: Performed by: EMERGENCY MEDICINE

## 2024-05-18 PROCEDURE — 96365 THER/PROPH/DIAG IV INF INIT: CPT

## 2024-05-18 PROCEDURE — 96367 TX/PROPH/DG ADDL SEQ IV INF: CPT

## 2024-05-18 PROCEDURE — 93306 TTE W/DOPPLER COMPLETE: CPT

## 2024-05-18 PROCEDURE — G0378 HOSPITAL OBSERVATION PER HR: HCPCS

## 2024-05-18 PROCEDURE — 82746 ASSAY OF FOLIC ACID SERUM: CPT | Performed by: NURSE PRACTITIONER

## 2024-05-18 PROCEDURE — 94640 AIRWAY INHALATION TREATMENT: CPT | Mod: XB

## 2024-05-18 PROCEDURE — 85610 PROTHROMBIN TIME: CPT | Performed by: NURSE PRACTITIONER

## 2024-05-18 PROCEDURE — 63600175 PHARM REV CODE 636 W HCPCS: Performed by: NURSE PRACTITIONER

## 2024-05-18 PROCEDURE — 84484 ASSAY OF TROPONIN QUANT: CPT | Performed by: EMERGENCY MEDICINE

## 2024-05-18 PROCEDURE — 82962 GLUCOSE BLOOD TEST: CPT

## 2024-05-18 PROCEDURE — 84484 ASSAY OF TROPONIN QUANT: CPT | Mod: 91 | Performed by: NURSE PRACTITIONER

## 2024-05-18 PROCEDURE — 84443 ASSAY THYROID STIM HORMONE: CPT | Performed by: NURSE PRACTITIONER

## 2024-05-18 PROCEDURE — 36415 COLL VENOUS BLD VENIPUNCTURE: CPT | Performed by: EMERGENCY MEDICINE

## 2024-05-18 PROCEDURE — 87040 BLOOD CULTURE FOR BACTERIA: CPT | Performed by: EMERGENCY MEDICINE

## 2024-05-18 PROCEDURE — 83690 ASSAY OF LIPASE: CPT | Performed by: EMERGENCY MEDICINE

## 2024-05-18 PROCEDURE — 83735 ASSAY OF MAGNESIUM: CPT | Performed by: EMERGENCY MEDICINE

## 2024-05-18 PROCEDURE — 94799 UNLISTED PULMONARY SVC/PX: CPT

## 2024-05-18 PROCEDURE — 99900035 HC TECH TIME PER 15 MIN (STAT)

## 2024-05-18 PROCEDURE — 96372 THER/PROPH/DIAG INJ SC/IM: CPT | Performed by: NURSE PRACTITIONER

## 2024-05-18 PROCEDURE — 36415 COLL VENOUS BLD VENIPUNCTURE: CPT | Performed by: NURSE PRACTITIONER

## 2024-05-18 PROCEDURE — 94761 N-INVAS EAR/PLS OXIMETRY MLT: CPT

## 2024-05-18 RX ORDER — ENOXAPARIN SODIUM 100 MG/ML
40 INJECTION SUBCUTANEOUS EVERY 24 HOURS
Status: DISCONTINUED | OUTPATIENT
Start: 2024-05-18 | End: 2024-05-21 | Stop reason: HOSPADM

## 2024-05-18 RX ORDER — CEFTRIAXONE 1 G/1
1 INJECTION, POWDER, FOR SOLUTION INTRAMUSCULAR; INTRAVENOUS
Status: DISCONTINUED | OUTPATIENT
Start: 2024-05-18 | End: 2024-05-18

## 2024-05-18 RX ORDER — ALUMINUM HYDROXIDE, MAGNESIUM HYDROXIDE, AND SIMETHICONE 1200; 120; 1200 MG/30ML; MG/30ML; MG/30ML
30 SUSPENSION ORAL 4 TIMES DAILY PRN
Status: DISCONTINUED | OUTPATIENT
Start: 2024-05-18 | End: 2024-05-21 | Stop reason: HOSPADM

## 2024-05-18 RX ORDER — AMOXICILLIN 250 MG
1 CAPSULE ORAL 2 TIMES DAILY
Status: DISCONTINUED | OUTPATIENT
Start: 2024-05-18 | End: 2024-05-21 | Stop reason: HOSPADM

## 2024-05-18 RX ORDER — LANOLIN ALCOHOL/MO/W.PET/CERES
800 CREAM (GRAM) TOPICAL
Status: DISCONTINUED | OUTPATIENT
Start: 2024-05-18 | End: 2024-05-21 | Stop reason: HOSPADM

## 2024-05-18 RX ORDER — THIAMINE HCL 100 MG
100 TABLET ORAL DAILY
Status: DISCONTINUED | OUTPATIENT
Start: 2024-05-18 | End: 2024-05-21 | Stop reason: HOSPADM

## 2024-05-18 RX ORDER — IPRATROPIUM BROMIDE AND ALBUTEROL SULFATE 2.5; .5 MG/3ML; MG/3ML
3 SOLUTION RESPIRATORY (INHALATION)
Status: DISCONTINUED | OUTPATIENT
Start: 2024-05-18 | End: 2024-05-20

## 2024-05-18 RX ORDER — ONDANSETRON HYDROCHLORIDE 2 MG/ML
4 INJECTION, SOLUTION INTRAVENOUS EVERY 6 HOURS PRN
Status: DISCONTINUED | OUTPATIENT
Start: 2024-05-18 | End: 2024-05-21 | Stop reason: HOSPADM

## 2024-05-18 RX ORDER — NALOXONE HCL 0.4 MG/ML
0.02 VIAL (ML) INJECTION
Status: DISCONTINUED | OUTPATIENT
Start: 2024-05-18 | End: 2024-05-21 | Stop reason: HOSPADM

## 2024-05-18 RX ORDER — CYANOCOBALAMIN 1000 UG/ML
1000 INJECTION, SOLUTION INTRAMUSCULAR; SUBCUTANEOUS
Status: DISCONTINUED | OUTPATIENT
Start: 2024-05-18 | End: 2024-05-21 | Stop reason: HOSPADM

## 2024-05-18 RX ORDER — TALC
6 POWDER (GRAM) TOPICAL NIGHTLY PRN
Status: DISCONTINUED | OUTPATIENT
Start: 2024-05-18 | End: 2024-05-21 | Stop reason: HOSPADM

## 2024-05-18 RX ORDER — GLUCAGON 1 MG
1 KIT INJECTION
Status: DISCONTINUED | OUTPATIENT
Start: 2024-05-18 | End: 2024-05-21 | Stop reason: HOSPADM

## 2024-05-18 RX ORDER — POLYETHYLENE GLYCOL 3350 17 G/17G
17 POWDER, FOR SOLUTION ORAL 2 TIMES DAILY PRN
Status: DISCONTINUED | OUTPATIENT
Start: 2024-05-18 | End: 2024-05-21 | Stop reason: HOSPADM

## 2024-05-18 RX ORDER — ACETAMINOPHEN 325 MG/1
650 TABLET ORAL EVERY 8 HOURS PRN
Status: DISCONTINUED | OUTPATIENT
Start: 2024-05-18 | End: 2024-05-21 | Stop reason: HOSPADM

## 2024-05-18 RX ORDER — FOLIC ACID 1 MG/1
1 TABLET ORAL DAILY
Status: DISCONTINUED | OUTPATIENT
Start: 2024-05-18 | End: 2024-05-21 | Stop reason: HOSPADM

## 2024-05-18 RX ORDER — SODIUM CHLORIDE, SODIUM LACTATE, POTASSIUM CHLORIDE, CALCIUM CHLORIDE 600; 310; 30; 20 MG/100ML; MG/100ML; MG/100ML; MG/100ML
INJECTION, SOLUTION INTRAVENOUS CONTINUOUS
Status: DISCONTINUED | OUTPATIENT
Start: 2024-05-18 | End: 2024-05-18

## 2024-05-18 RX ORDER — FUROSEMIDE 10 MG/ML
20 INJECTION INTRAMUSCULAR; INTRAVENOUS ONCE
Status: DISCONTINUED | OUTPATIENT
Start: 2024-05-18 | End: 2024-05-18

## 2024-05-18 RX ORDER — IBUPROFEN 200 MG
24 TABLET ORAL
Status: DISCONTINUED | OUTPATIENT
Start: 2024-05-18 | End: 2024-05-21 | Stop reason: HOSPADM

## 2024-05-18 RX ORDER — SODIUM CHLORIDE 0.9 % (FLUSH) 0.9 %
10 SYRINGE (ML) INJECTION EVERY 12 HOURS PRN
Status: DISCONTINUED | OUTPATIENT
Start: 2024-05-18 | End: 2024-05-21 | Stop reason: HOSPADM

## 2024-05-18 RX ORDER — IBUPROFEN 200 MG
16 TABLET ORAL
Status: DISCONTINUED | OUTPATIENT
Start: 2024-05-18 | End: 2024-05-21 | Stop reason: HOSPADM

## 2024-05-18 RX ORDER — ACETAMINOPHEN 325 MG/1
650 TABLET ORAL EVERY 4 HOURS PRN
Status: DISCONTINUED | OUTPATIENT
Start: 2024-05-18 | End: 2024-05-21 | Stop reason: HOSPADM

## 2024-05-18 RX ADMIN — THIAMINE HCL TAB 100 MG 100 MG: 100 TAB at 02:05

## 2024-05-18 RX ADMIN — SODIUM CHLORIDE, POTASSIUM CHLORIDE, SODIUM LACTATE AND CALCIUM CHLORIDE: 600; 310; 30; 20 INJECTION, SOLUTION INTRAVENOUS at 08:05

## 2024-05-18 RX ADMIN — THIAMINE HYDROCHLORIDE 300 MG: 100 INJECTION, SOLUTION INTRAMUSCULAR; INTRAVENOUS at 02:05

## 2024-05-18 RX ADMIN — IPRATROPIUM BROMIDE AND ALBUTEROL SULFATE 3 ML: 2.5; .5 SOLUTION RESPIRATORY (INHALATION) at 07:05

## 2024-05-18 RX ADMIN — IPRATROPIUM BROMIDE AND ALBUTEROL SULFATE 3 ML: 2.5; .5 SOLUTION RESPIRATORY (INHALATION) at 01:05

## 2024-05-18 RX ADMIN — CYANOCOBALAMIN 1000 MCG: 1000 INJECTION, SOLUTION INTRAMUSCULAR at 08:05

## 2024-05-18 RX ADMIN — PIPERACILLIN SODIUM AND TAZOBACTAM SODIUM 3.38 G: 3; .375 INJECTION, POWDER, LYOPHILIZED, FOR SOLUTION INTRAVENOUS at 02:05

## 2024-05-18 RX ADMIN — AZITHROMYCIN DIHYDRATE 500 MG: 500 INJECTION, POWDER, LYOPHILIZED, FOR SOLUTION INTRAVENOUS at 07:05

## 2024-05-18 RX ADMIN — FOLIC ACID 1 MG: 1 TABLET ORAL at 02:05

## 2024-05-18 RX ADMIN — SENNOSIDES AND DOCUSATE SODIUM 1 TABLET: 50; 8.6 TABLET ORAL at 09:05

## 2024-05-18 RX ADMIN — SODIUM CHLORIDE, POTASSIUM CHLORIDE, SODIUM LACTATE AND CALCIUM CHLORIDE 1000 ML: 600; 310; 30; 20 INJECTION, SOLUTION INTRAVENOUS at 05:05

## 2024-05-18 RX ADMIN — CEFTRIAXONE SODIUM 1 G: 1 INJECTION, POWDER, FOR SOLUTION INTRAMUSCULAR; INTRAVENOUS at 06:05

## 2024-05-18 RX ADMIN — Medication 800 MG: at 04:05

## 2024-05-18 RX ADMIN — ENOXAPARIN SODIUM 40 MG: 40 INJECTION SUBCUTANEOUS at 04:05

## 2024-05-18 RX ADMIN — PIPERACILLIN SODIUM AND TAZOBACTAM SODIUM 3.38 G: 3; .375 INJECTION, POWDER, LYOPHILIZED, FOR SOLUTION INTRAVENOUS at 10:05

## 2024-05-18 RX ADMIN — Medication 6 MG: at 07:05

## 2024-05-18 RX ADMIN — POTASSIUM BICARBONATE 50 MEQ: 977.5 TABLET, EFFERVESCENT ORAL at 04:05

## 2024-05-18 NOTE — ASSESSMENT & PLAN NOTE
Admit to med/tele    This patient does have evidence of infective focus  My overall impression is sepsis.  Source: Respiratory  Antibiotics given-   Antibiotics (72h ago, onward)      Start     Stop Route Frequency Ordered    05/18/24 1500  piperacillin-tazobactam 3.375 g in dextrose 5 % 100 mL IVPB (ready to mix)         -- IV Every 8 hours (non-standard times) 05/18/24 1349          Latest lactate reviewed-  Recent Labs   Lab 05/18/24  1017   LACTATE 2.9*     Organ dysfunction indicated by Acute kidney injury    Fluid challenge: received 1L bolus, MAP never dropped below 65, getting LR at 130mL for now     Post- resuscitation assessment No - Post resuscitation assessment not needed       Will Not start Pressors- Levophed for MAP of 65  Source control achieved by: abx

## 2024-05-18 NOTE — ASSESSMENT & PLAN NOTE
Pt arrived to floor and HR noted in the 40s  Repeat EKG Second degree AV block Mobitz I HR 50s  Cards consulted  Check TSH  Echo

## 2024-05-18 NOTE — RESPIRATORY THERAPY
05/18/24 1336   Patient Assessment/Suction   Level of Consciousness (AVPU) alert   Respiratory Effort Unlabored   All Lung Fields Breath Sounds coarse;diminished   Cough Type nonproductive   PRE-TX-O2   Device (Oxygen Therapy) room air   SpO2 95 %   Pulse Oximetry Type Intermittent   $ Pulse Oximetry - Multiple Charge Pulse Oximetry - Multiple   Pulse (!) 59   Resp 18   Aerosol Therapy   $ Aerosol Therapy Charges Aerosol Treatment   Respiratory Treatment Status (SVN) given   Treatment Route (SVN) mask   Patient Position HOB elevated   Post Treatment Assessment (SVN) breath sounds improved   Signs of Intolerance (SVN) none   Breath Sounds Post-Respiratory Treatment   Post-treatment Heart Rate (beats/min) 60   Post-treatment Resp Rate (breaths/min) 18   Tobacco Cessation Intervention   Do you use any type of tobacco product? No   Respiratory Evaluation   $ Care Plan Tech Time 15 min   $ Eval/Re-eval Charges Evaluation   Evaluation For New Orders   Home Oxygen   Has Home Oxygen? No   Home Aerosol, MDI, DPI, and Other Treatments/Therapies   Home Respiratory Therapy Per Patient/Review of Chart No   Oxygen Care Plan   Rationale No rational found   Bronchodilator Care Plan   Bronchodilator Care Plan Aerosol   Aerosol Meds w/ frequency Albuterol - Ipratropium (DUO-NEB) 0.5mg-3mg(2.5ml) 3ml Nebulizer Solution2.5mg TID   Atelectasis Care Plan   Rationale No Rational Found   Airway Clearance Care Plan   Rationale No rationale found

## 2024-05-18 NOTE — H&P
[facility]  History & Physical    Patient Name: Elpidio Ayala  MRN: [id number]  Patient Class: OP- Observation  Admission Date: 5/18/2024  Attending Physician: Jenelle Portillo MD   Primary Care Provider: Cali Win Jr., MD         Patient information was obtained from patient, relative(s), past medical records, and ER records.     Subjective:     Principal Problem:Sepsis    Chief Complaint:   Chief Complaint   Patient presents with    Altered Mental Status     Pt brother said when he went to check on pt at 4 AM that pt was weak and seemed to be slow to respond        HPI: Mr. Ayala is an 89 year old male with a history of BPH and dementia who presents today with complaints of AMS per his brother who went to check on him a 4 am. It is moderate. It is associated with episode of vomiting and nonproductive cough. He denies fever, chills, diarrhea, or LOC. History is limited d/t underlying dementia but is supplemented by brother who is at bedside. He lives alone and is normally oriented to person and place and somewhat situation. This morning he was only oriented to person and was slow to respond prompting his presentation. BP was soft on arrival 99/55 HR 62 satting 92% or better on room air. He was given 1L fluid bolus now with mild hypertension /72. CXR revealed: patchy interstitial opacities, WBC 13.9K, macrocytosis, lactate 2.8, procal 0.5, , highly sensitive troponin WNL, T bili 2.9 (with chronic elevation), UA concentrated but bland, CT head with no acute intracranial abnormalities, abd US Mild right-sided hydronephrosis, Sludge in the gallbladder without findings of acute cholecystitis. Initial EKG: accelerated junctional with Qtc 519. Hospital medicine is consulted for admission for sepsis.     Past Medical History:   Diagnosis Date    BPH (benign prostatic hypertrophy)     Scrotal mass     Wears glasses        Past Surgical History:   Procedure  Laterality Date    EYE SURGERY  BILAT CATARACT WITH LENS    PROSTATE SURGERY      TONSILLECTOMY         Review of patient's allergies indicates:   Allergen Reactions    No known drug allergies        No current facility-administered medications on file prior to encounter.     Current Outpatient Medications on File Prior to Encounter   Medication Sig    [DISCONTINUED] cromolyn (NASALCHROM) 5.2 mg/spray (4 %) nasal spray 1 spray by Nasal route 4 (four) times daily. (Patient not taking: Reported on 6/22/2021)    [DISCONTINUED] docusate sodium (COLACE) 100 MG capsule docusate sodium 100 mg capsule   Take 1 capsule every day by oral route.    [DISCONTINUED] fluticasone propionate (FLONASE) 50 mcg/actuation nasal spray 1 spray (50 mcg total) by Each Nostril route 2 (two) times daily as needed for Rhinitis. (Patient not taking: Reported on 6/22/2021)    [DISCONTINUED] hydrocortisone 2.5 % cream Apply topically 2 (two) times daily.    [DISCONTINUED] polyethylene glycol (GLYCOLAX) 17 gram PwPk Take 17 g by mouth once daily. (Patient not taking: Reported on 6/22/2021)     Family History    None       Tobacco Use    Smoking status: Never    Smokeless tobacco: Never   Substance and Sexual Activity    Alcohol use: No    Drug use: No    Sexual activity: Not on file     Review of Systems   Unable to perform ROS: Dementia   Constitutional:  Negative for fever.   Respiratory:  Positive for cough.    Gastrointestinal:  Positive for nausea and vomiting. Negative for abdominal pain and diarrhea.     Objective:     Vital Signs (Most Recent):  Temp: 97.9 °F (36.6 °C) (05/18/24 1325)  Pulse: (!) 59 (05/18/24 1336)  Resp: 18 (05/18/24 1336)  BP: (!) 166/72 (05/18/24 1325)  SpO2: 95 % (05/18/24 1336) Vital Signs (24h Range):  Temp:  [97.6 °F (36.4 °C)-97.9 °F (36.6 °C)] 97.9 °F (36.6 °C)  Pulse:  [55-77] 59  Resp:  [16-20] 18  SpO2:  [92 %-97 %] 95 %  BP: ()/(55-91) 166/72     Weight: 81.6 kg (180 lb)  Body mass index is 23.11  kg/m².     Physical Exam  Vitals and nursing note reviewed.   Constitutional:       Appearance: He is well-developed.   HENT:      Head: Normocephalic and atraumatic.   Eyes:      Conjunctiva/sclera: Conjunctivae normal.      Pupils: Pupils are equal, round, and reactive to light.   Cardiovascular:      Rate and Rhythm: Regular rhythm. Bradycardia present.      Pulses: Normal pulses.   Pulmonary:      Effort: Pulmonary effort is normal.      Comments: Diminished throughout  Abdominal:      General: Bowel sounds are normal.      Palpations: Abdomen is soft.   Musculoskeletal:         General: Normal range of motion.      Cervical back: Normal range of motion and neck supple.      Right lower leg: Edema present.      Left lower leg: Edema present.      Comments: Bilat LE edema 1+   Skin:     General: Skin is warm and dry.      Capillary Refill: Capillary refill takes less than 2 seconds.   Neurological:      Mental Status: He is alert.      Comments: Oriented to person and place only   Psychiatric:         Behavior: Behavior normal.         Thought Content: Thought content normal.         Judgment: Judgment normal.              CRANIAL NERVES     CN III, IV, VI   Pupils are equal, round, and reactive to light.       Significant Labs: All pertinent labs within the past 24 hours have been reviewed.  CBC:   Recent Labs   Lab 05/18/24 0521   WBC 13.95*   HGB 15.4   HCT 45.8        CMP:   Recent Labs   Lab 05/18/24 0521      K 3.7      CO2 22*   *   BUN 28*   CREATININE 1.2   CALCIUM 8.4*   PROT 6.1   ALBUMIN 3.9   BILITOT 2.9*   ALKPHOS 54*   AST 15   ALT 11   ANIONGAP 11     Lactic Acid:   Recent Labs   Lab 05/18/24 0521 05/18/24  1017   LACTATE 2.8* 2.9*     Troponin:   Recent Labs   Lab 05/18/24 0521   TROPONINIHS 11.8       Significant Imaging: I have reviewed all pertinent imaging results/findings within the past 24 hours.  EKG: I have reviewed all pertinent results/findings within the  past 24 hours and my personal findings are: Accelerated junctional Qtc 519    US Abdomen Limited    Result Date: 5/18/2024  CLINICAL HISTORY: ([id number])88 y/o  (1935) M Hyperbilirubinemia; TECHNIQUE: (A#[id number], exam time 5/18/2024 7:38) US ABDOMEN LIMITED [id number] Right upper quadrant ultrasound, images obtained in grayscale and color. Additional Doppler images of the portal vein were obtained. COMPARISON: None available. FINDINGS: Please note this examination is technically suboptimal due to patient related factors  including body habitus as well as overlying bowel gas. The LIVER is normal in size and contour at 16.5 cm (sagittal right lobe). Hepatic parenchyma has normal echogenicity with normal delineation of the periportal triads. No definite intrahepatic masses are noted. The portal vein is patent with hepatopetal flow . The BILIARY SYSTEM is normal in caliber; the common hepatic duct measures 5 mm. There are is sludge seen in the GALL BLADDER. There is no gallbladder wall thickening, pericholecystic fluid or sonographic Strickland sign to suggest acute cholecystitis. The PANCREATIC head is partially visualized but grossly normal in appearance. The pancreatic duct is not dilated. The right KIDNEY is normal in size at 12.8 x 5.9 x 4.8 cm and echogenicity/texture.  There is mild right-sided hydronephrosis.  No shadowing stones, contour deforming renal mass or cyst is identified. The AORTA and IVC are partially visualized and unremarkable.     1.  Mild right-sided hydronephrosis. 2.  Sludge in the gallbladder without findings of acute cholecystitis. 3.  No intra/extrahepatic biliary ductal dilation. . Electronically signed by: Colby Guerrero Date:    05/18/2024 Time:    07:40    X-Ray Chest AP Portable    Result Date: 5/18/2024  EXAMINATION: XR CHEST AP PORTABLE CLINICAL HISTORY: Sepsis; TECHNIQUE: Single frontal view of the chest was performed. COMPARISON: Chest radiograph performed 04/16/2020, 09:06 hours.  FINDINGS: Monitoring leads overlie the chest.  Cardiomediastinal contours grossly unchanged.  Atherosclerosis of the aorta. Patchy interstitial predominant opacities are noted bilaterally. No definite pneumothorax or large volume pleural effusion. No acute findings in the visualized abdomen. Osseous and soft tissue structures appear without definite acute change.     Patchy interstitial predominant opacities bilaterally could relate to edema versus infectious or non infectious inflammatory process. Electronically signed by: Conor Stokes Date:    05/18/2024 Time:    06:28    CT Head Without Contrast    Result Date: 5/18/2024  EXAMINATION: CT HEAD WITHOUT CONTRAST CLINICAL HISTORY: Mental status change, unknown cause; TECHNIQUE: Low dose axial images were obtained through the head.  Coronal and sagittal reformations were also performed. Contrast was not administered. COMPARISON: MRI brain performed 04/16/2020. CT head 04/16/2020 FINDINGS: Blood: No acute intracranial hemorrhage. Parenchyma: No definite loss of gray-white differentiation to suggest acute or subacute transcortical infarct. Generalized pattern age-related parenchymal volume loss.  Nonspecific areas of white matter hypoattenuation may reflect sequela of chronic small vessel ischemic disease.  Lacunar type insults involve the deep gray nuclei. Ventricles/Extra-axial spaces: No abnormal extra-axial fluid collection. Basal cisterns are patent. Vessels: Mild atherosclerotic calcifications. Orbits: Status post bilateral lens replacements. Scalp: Unremarkable. Skull: There are no depressed skull fractures or destructive bone lesions. Sinuses and mastoids: Scattered paranasal sinus mucosal thickening.  Near complete chronic appearing opacification of the right sphenoid sinus with osteitis of the sinus walls.  Relatively modest paranasal sinus mucosal thickening elsewhere. Other findings: None     No definite acute intracranial findings by noncontrast CT.  Electronically signed by: Conor Stokes Date:    05/18/2024 Time:    06:27     Assessment/Plan:     * Sepsis  Admit to med/tele    This patient does have evidence of infective focus  My overall impression is sepsis.  Source: Respiratory  Antibiotics given-   Antibiotics (72h ago, onward)      Start     Stop Route Frequency Ordered    05/18/24 1500  piperacillin-tazobactam 3.375 g in dextrose 5 % 100 mL IVPB (ready to mix)         -- IV Every 8 hours (non-standard times) 05/18/24 1349          Latest lactate reviewed-  Recent Labs   Lab 05/18/24  1017   LACTATE 2.9*     Organ dysfunction indicated by Acute kidney injury    Fluid challenge: received 1L bolus, MAP never dropped below 65, getting LR at 130mL for now     Post- resuscitation assessment No - Post resuscitation assessment not needed       Will Not start Pressors- Levophed for MAP of 65  Source control achieved by: abx    Macrocytosis  Check B12/folic acid  Presumptively start on IV thiamine and folic acid  Check ethanol levels      Symptomatic bradycardia  Pt arrived to floor and HR noted in the 40s  Repeat EKG Second degree AV block Mobitz I HR 50s  Cards consulted  Check TSH  Echo       BPH (benign prostatic hyperplasia)  Mild right sided hydronephrosis likely d/t this  Urine is bland without signs of infection         Elevated bilirubin  Present over the last few years, but a little worse which may be d/t mild dehydration  Gilbert's?  Abd US with CBD WNL  Does have macrocytosis - alcohol?  Check ethanol levels  Get PT/INR  Trend CMP        Pneumonia  Patient has a diagnosis of pneumonia. The cause of the pneumonia is suspected to be bacterial in etiology but organism is not known. The pneumonia is stable. The patient has the following signs/symptoms of pneumonia: cough. The patient does not have a current oxygen requirement and the patient does not have a home oxygen requirement. I have reviewed the pertinent imaging. The following cultures have been  collected: Blood cultures The culture results are listed below.     Current antimicrobial regimen consists of the antibiotics listed below. Will monitor patient closely and continue current treatment plan unchanged.    Antibiotics (From admission, onward)      Start     Stop Route Frequency Ordered    05/18/24 1500  piperacillin-tazobactam 3.375 g in dextrose 5 % 100 mL IVPB (ready to mix)         -- IV Every 8 hours (non-standard times) 05/18/24 1349            Microbiology Results (last 7 days)       Procedure Component Value Units Date/Time    Blood culture x two cultures. Draw prior to antibiotics. [[id number]] Collected: 05/18/24 0555    Order Status: Completed Specimen: Blood from Peripheral, Hand, Right Updated: 05/18/24 1158     Blood Culture, Routine No Growth to date    Narrative:      Aerobic and anaerobic  Collection has been rescheduled by B1 at 05/18/2024 05:42 Reason:   Pt in scan/will wait  Collection has been rescheduled by B1 at 05/18/2024 05:42 Reason:   Pt in scan/will wait    Blood culture x two cultures. Draw prior to antibiotics. [[id number]] Collected: 05/18/24 0554    Order Status: Completed Specimen: Blood from Peripheral, Hand, Left Updated: 05/18/24 1158     Blood Culture, Routine No Growth to date    Narrative:      Aerobic and anaerobic  Collection has been rescheduled by [facility] at 05/18/2024 05:42 Reason:   Pt in scan/will wait  Collection has been rescheduled by [facility] at 05/18/2024 05:42 Reason:   Pt in scan/will wait            Suspect aspiration   Initially started on rocephin/azithro in ED, would hold off on further azithro given Qtc prolongation    Encephalopathy, metabolic  Multifactorial  Infection and bradycardia likely contributing         VTE Risk Mitigation (From admission, onward)           Ordered     enoxaparin injection 40 mg  Daily         05/18/24 1101     IP VTE HIGH RISK PATIENT  Once         05/18/24 1101     Place sequential compression device  Until discontinued          05/18/24 1101                         On 05/18/2024, patient should be placed in hospital observation services under my care in collaboration with Dr. Portillo.           Maddie Murillo NP  Department of Hospital Medicine  [facility]

## 2024-05-18 NOTE — HPI
Mr. Ayala is an 89 year old male with a history of BPH and dementia who presents today with complaints of AMS per his brother who went to check on him a 4 am. It is moderate. It is associated with episode of vomiting and nonproductive cough. He denies fever, chills, diarrhea, or LOC. History is limited d/t underlying dementia but is supplemented by brother who is at bedside. He lives alone and is normally oriented to person and place and somewhat situation. This morning he was only oriented to person and was slow to respond prompting his presentation. BP was soft on arrival 99/55 HR 62 satting 92% or better on room air. He was given 1L fluid bolus now with mild hypertension /72. CXR revealed: patchy interstitial opacities, WBC 13.9K, macrocytosis, lactate 2.8, procal 0.5, , highly sensitive troponin WNL, T bili 2.9 (with chronic elevation), UA concentrated but bland, CT head with no acute intracranial abnormalities, abd US Mild right-sided hydronephrosis, Sludge in the gallbladder without findings of acute cholecystitis. Initial EKG: accelerated junctional with Qtc 519. Hospital medicine is consulted for admission for sepsis.

## 2024-05-18 NOTE — NURSING
1412: Pt heart rate in the 40's notified NP. Orders form EKG. EKG showed pt has mobitz type 1 with heart rate in the 40's. NP aware. New orders to consult cards. Pacer pads placed.

## 2024-05-18 NOTE — NURSING
Nurses Note -- 4 Eyes      5/18/2024   3:19 PM      Skin assessed during: Transfer      [x] No Altered Skin Integrity Present    [x]Prevention Measures Documented      [] Yes- Altered Skin Integrity Present or Discovered   [] LDA Added if Not in Epic (Describe Wound)   [] New Altered Skin Integrity was Present on Admit and Documented in LDA   [] Wound Image Taken    Wound Care Consulted? No    Attending Nurse:  Vita Clifton RN/Staff Member:  Judy Quintana RN

## 2024-05-18 NOTE — ED PROVIDER NOTES
Encounter Date: 5/18/2024       History     Chief Complaint   Patient presents with    Altered Mental Status     Pt brother said when he went to check on pt at 4 AM that pt was weak and seemed to be slow to respond     89-year-old male with history of BPH, early dementia.  Patient presents emergency department with complaint of increasing weakness, hypotension, and confusion which was noticed by his brother at 5:00 p.m. yesterday.  According to his brother states checks on him daily and upon checking on him noticed that he was extremely weak and seemed more confused than normal.  Patient did admit to generalized weakness.  Denied headache, no fever, no chest or abdominal pain.  His brother did state however he had 2 episodes of nonbilious, nonbloody emesis.  No diarrhea was reported.  Upon arrival to the emergency department patient found to be hypotensive blood pressure 90/60 and was initially confused to time and event.  Otherwise had grossly nonfocal neurologic examination.      Review of patient's allergies indicates:   Allergen Reactions    No known drug allergies      Past Medical History:   Diagnosis Date    BPH (benign prostatic hypertrophy)     Scrotal mass     Wears glasses      Past Surgical History:   Procedure Laterality Date    EYE SURGERY  BILAT CATARACT WITH LENS    PROSTATE SURGERY      TONSILLECTOMY       Family History   Problem Relation Name Age of Onset    Prostate cancer Neg Hx      Urolithiasis Neg Hx      Kidney cancer Neg Hx       Social History     Tobacco Use    Smoking status: Never    Smokeless tobacco: Never   Substance Use Topics    Alcohol use: No    Drug use: No     Review of Systems   Constitutional:  Negative for fever.   Respiratory:  Negative for shortness of breath.    Cardiovascular:  Negative for chest pain.   Gastrointestinal:  Positive for nausea and vomiting. Negative for abdominal distention, abdominal pain and diarrhea.   Genitourinary:  Negative for dysuria.    Musculoskeletal:  Negative for back pain.   Skin:  Negative for rash.   Neurological:  Positive for weakness.   Hematological:  Does not bruise/bleed easily.   Psychiatric/Behavioral:  Positive for confusion.        Physical Exam     Initial Vitals [05/18/24 0513]   BP Pulse Resp Temp SpO2   (!) 130/58 73 16 97.6 °F (36.4 °C) (!) 92 %      MAP       --         Physical Exam    Nursing note and vitals reviewed.  Constitutional:   Elderly appearing male, no acute distress was disoriented to place.  However alert to person and time.  (brother states this is his baseline)   HENT:   Head: Normocephalic and atraumatic.   Nose: Nose normal.   Mouth/Throat: No oropharyngeal exudate ([id number]).   Oropharynx clear, dry mucous membrane   Eyes: Conjunctivae and EOM are normal. Pupils are equal, round, and reactive to light. No scleral icterus.   Neck: Neck supple.   Normal range of motion.  Cardiovascular:  Normal rate, regular rhythm, normal heart sounds and intact distal pulses.     Exam reveals no gallop and no friction rub.       No murmur heard.  Pulmonary/Chest: No stridor. No respiratory distress.   Course bilateral breath sounds no adventitious sounds   Abdominal: Abdomen is soft. Bowel sounds are normal. He exhibits no distension and no mass. There is no abdominal tenderness. There is no rebound and no guarding.   Musculoskeletal:         General: No tenderness or edema. Normal range of motion.      Cervical back: Normal range of motion and neck supple.     Lymphadenopathy:     He has no cervical adenopathy.   Neurological: He is alert. He has normal strength and normal reflexes. He displays normal reflexes. No cranial nerve deficit or sensory deficit. GCS eye subscore is 4. GCS verbal subscore is 5. GCS motor subscore is 6.   Elderly appearing male, no acute distress was disoriented to place.  However alert to person and time.  (brother states this is his baseline)    GCS E-4 M-6 V-4 =14   Skin: Skin is warm and  dry. Capillary refill takes less than 2 seconds. No rash noted.   Psychiatric: He has a normal mood and affect. His behavior is normal. Judgment and thought content normal.         ED Course   Procedures  Labs Reviewed   CBC W/ AUTO DIFFERENTIAL - Abnormal; Notable for the following components:       Result Value    WBC 13.95 (*)      (*)     MCH 33.5 (*)     Gran # (ANC) 12.6 (*)     Immature Grans (Abs) 0.06 (*)     Lymph # 0.8 (*)     Gran % 90.5 (*)     Lymph % 5.4 (*)     Mono % 3.6 (*)     All other components within normal limits   COMPREHENSIVE METABOLIC PANEL - Abnormal; Notable for the following components:    CO2 22 (*)     Glucose 141 (*)     BUN 28 (*)     Calcium 8.4 (*)     Total Bilirubin 2.9 (*)     Alkaline Phosphatase 54 (*)     eGFR 58.2 (*)     All other components within normal limits   LACTIC ACID, PLASMA - Abnormal; Notable for the following components:    Lactate (Lactic Acid) 2.8 (*)     All other components within normal limits   URINALYSIS, REFLEX TO URINE CULTURE - Abnormal; Notable for the following components:    Specific Gravity, UA >1.030 (*)     Protein, UA Trace (*)     Ketones, UA Trace (*)     Urobilinogen, UA 2.0-3.0 (*)     All other components within normal limits    Narrative:     Specimen Source->Urine   B-TYPE NATRIURETIC PEPTIDE - Abnormal; Notable for the following components:     (*)     All other components within normal limits   PROCALCITONIN - Abnormal; Notable for the following components:    Procalcitonin 0.514 (*)     All other components within normal limits   POCT GLUCOSE - Abnormal; Notable for the following components:    POC Glucose 138 (*)     All other components within normal limits   CULTURE, BLOOD    Narrative:     Collection has been rescheduled by [facility] at 05/18/2024 05:42 Reason:   Pt in scan/will wait  Collection has been rescheduled by [facility] at 05/18/2024 05:42 Reason:   Pt in scan/will wait   CULTURE, BLOOD    Narrative:     Collection  has been rescheduled by [email or web address] at 05/18/2024 05:42 Reason:   Pt in scan/will wait  Collection has been rescheduled by [email or web address] at 05/18/2024 05:42 Reason:   Pt in scan/will wait   TROPONIN I HIGH SENSITIVITY   MAGNESIUM   INFLUENZA A AND B ANTIGEN    Narrative:     Specimen Source->Nasopharyngeal Swab   LIPASE   SARS-COV-2 RNA AMPLIFICATION, QUAL   LACTIC ACID, PLASMA   ISTAT CREATININE        ECG Results              EKG 12-lead (In process)        Collection Time Result Time QRS Duration OHS QTC Calculation    05/18/24 05:14:01 05/18/24 06:39:52 84 513                     In process by Interface, Lab In [facility] (05/18/24 06:39:55)                   Narrative:    Test Reason : R00.0,    Vent. Rate : 097 BPM     Atrial Rate : 097 BPM     P-R Int : 000 ms          QRS Dur : 084 ms      QT Int : 404 ms       P-R-T Axes : 000 076 067 degrees     QTc Int : 513 ms    Accelerated Junctional rhythm  Prolonged QT  Abnormal ECG  When compared with ECG of 18-MAY-2024 05:13,  Junctional rhythm has replaced Sinus rhythm  Vent. rate has increased BY  41 BPM  QT has lengthened    Referred By: [email or web address]   SELF           Confirmed By:                                   Imaging Results              US Abdomen Limited (Final result)  Result time 05/18/24 07:40:57      Final result by Colby Guerrero MD (05/18/24 07:40:57)                   Impression:      1.  Mild right-sided hydronephrosis.    2.  Sludge in the gallbladder without findings of acute cholecystitis.    3.  No intra/extrahepatic biliary ductal dilation.    .      Electronically signed by: Colby Guerrero  Date:    05/18/2024  Time:    07:40               Narrative:    CLINICAL HISTORY:  ([id number])89 y/o  (1935) M    Hyperbilirubinemia;    TECHNIQUE:  (A#[id number], exam time 5/18/2024 7:38)    US ABDOMEN LIMITED [id number]    Right upper quadrant ultrasound, images obtained in grayscale and color. Additional Doppler images of the portal vein were  obtained.    COMPARISON:  None available.    FINDINGS:  Please note this examination is technically suboptimal due to patient related factors  including body habitus as well as overlying bowel gas.    The LIVER is normal in size and contour at 16.5 cm (sagittal right lobe). Hepatic parenchyma has normal echogenicity with normal delineation of the periportal triads. No definite intrahepatic masses are noted. The portal vein is patent with hepatopetal flow .    The BILIARY SYSTEM is normal in caliber; the common hepatic duct measures 5 mm. There are is sludge seen in the GALL BLADDER. There is no gallbladder wall thickening, pericholecystic fluid or sonographic Strickland sign to suggest acute cholecystitis.    The PANCREATIC head is partially visualized but grossly normal in appearance. The pancreatic duct is not dilated.    The right KIDNEY is normal in size at 12.8 x 5.9 x 4.8 cm and echogenicity/texture.  There is mild right-sided hydronephrosis.  No shadowing stones, contour deforming renal mass or cyst is identified.    The AORTA and IVC are partially visualized and unremarkable.                                       CT Head Without Contrast (Final result)  Result time 05/18/24 06:27:17      Final result by Conor Stokes MD (05/18/24 06:27:17)                   Impression:      No definite acute intracranial findings by noncontrast CT.      Electronically signed by: Conor Stokes  Date:    05/18/2024  Time:    06:27               Narrative:    EXAMINATION:  CT HEAD WITHOUT CONTRAST    CLINICAL HISTORY:  Mental status change, unknown cause;    TECHNIQUE:  Low dose axial images were obtained through the head.  Coronal and sagittal reformations were also performed. Contrast was not administered.    COMPARISON:  MRI brain performed 04/16/2020. CT head 04/16/2020    FINDINGS:  Blood: No acute intracranial hemorrhage.    Parenchyma: No definite loss of gray-white differentiation to suggest acute or subacute  transcortical infarct. Generalized pattern age-related parenchymal volume loss.  Nonspecific areas of white matter hypoattenuation may reflect sequela of chronic small vessel ischemic disease.  Lacunar type insults involve the deep gray nuclei.    Ventricles/Extra-axial spaces: No abnormal extra-axial fluid collection. Basal cisterns are patent.    Vessels: Mild atherosclerotic calcifications.    Orbits: Status post bilateral lens replacements.    Scalp: Unremarkable.    Skull: There are no depressed skull fractures or destructive bone lesions.    Sinuses and mastoids: Scattered paranasal sinus mucosal thickening.  Near complete chronic appearing opacification of the right sphenoid sinus with osteitis of the sinus walls.  Relatively modest paranasal sinus mucosal thickening elsewhere.    Other findings: None                                       X-Ray Chest AP Portable (Final result)  Result time 05/18/24 06:28:37      Final result by Conor Stokes MD (05/18/24 06:28:37)                   Impression:      Patchy interstitial predominant opacities bilaterally could relate to edema versus infectious or non infectious inflammatory process.      Electronically signed by: Conor Stokes  Date:    05/18/2024  Time:    06:28               Narrative:    EXAMINATION:  XR CHEST AP PORTABLE    CLINICAL HISTORY:  Sepsis;    TECHNIQUE:  Single frontal view of the chest was performed.    COMPARISON:  Chest radiograph performed 04/16/2020, 09:06 hours.    FINDINGS:  Monitoring leads overlie the chest.  Cardiomediastinal contours grossly unchanged.  Atherosclerosis of the aorta.    Patchy interstitial predominant opacities are noted bilaterally.    No definite pneumothorax or large volume pleural effusion.    No acute findings in the visualized abdomen.    Osseous and soft tissue structures appear without definite acute change.                                       Medications   lactated ringers infusion ( Intravenous New Bag  5/18/24 0803)   lactated ringers bolus 1,000 mL (0 mLs Intravenous Stopped 5/18/24 0630)   azithromycin 500 mg in dextrose 5 % 250 mL IVPB (ready to mix) (500 mg Intravenous New Bag 5/18/24 0759)   cefTRIAXone (ROCEPHIN) 1 g in dextrose 5 % 100 mL IVPB (ready to mix) (0 g Intravenous Stopped 5/18/24 0719)     Medical Decision Making  Amount and/or Complexity of Data Reviewed  Labs: ordered.  Radiology: ordered.    Risk  Prescription drug management.  Decision regarding hospitalization.               ED Course as of 05/18/24 0922   Sat May 18, 2024   0918 Patient seen evaluated in the emergency department with worsening confusion, weakness and hypotension which was noted by his brother.  Patient on arrival to emergency department had full workup with workup remarkable for lactic acidosis with lactate of 2.8, urine specific gravity 10 30, BUN 28, creatinine 1.2, white count 55211.  Chest x-ray showed increased interstitial markings concern for atypical pneumonia versus CHF.  Patient with BNP of 260, no paresis to compare.  Lungs were coarse.  Patient profile found to be 0.514.  At this time patient was given Rocephin Zithromax in emergency department as well as given  mL an hour.  Will be admitted to follow blood cultures also will need 2D echo.  Patient did have improvement in blood pressure to 1 15/60 with heart rate of 70, sats 96% on room air.  Remained hemodynamically adequate awaiting admission hospital Medicine. [RM]      ED Course User Index  [RM] Emanuel Mas MD               Medical Decision Making:   Initial Assessment:   89-year-old male with history of BPH, early dementia.  Patient presents emergency department with complaint of increasing weakness, hypotension, and confusion which was noticed by his brother at 5:00 p.m. yesterday.  According to his brother states checks on him daily and upon checking on him noticed that he was extremely weak and seemed more confused than normal.  Patient did  "admit to generalized weakness.  Denied headache, no fever, no chest or abdominal pain.  His brother did state however he had 2 episodes of nonbilious, nonbloody emesis.  No diarrhea was reported.  Upon arrival to the emergency department patient found to be hypotensive blood pressure 90/60 and was initially confused to time and event.  Otherwise had grossly nonfocal neurologic examination.    Differential Diagnosis:   Dehydration, pneumonia, bacteremia, urinary tract infection, TIA, CVA,  Clinical Tests:   Lab Tests: Ordered and Reviewed  Radiological Study: Ordered and Reviewed  Medical Tests: Ordered and Reviewed  Sepsis Perfusion Assessment: "I attest a sepsis perfusion exam was performed within 6 hours of sepsis, severe sepsis, or septic shock presentation, following fluid resuscitation."             Clinical Impression:  Final diagnoses:  [R00.0] Tachycardia  [E86.0] Dehydration (Primary)  [I95.9] Hypotension, unspecified hypotension type  [J18.9] Atypical pneumonia          ED Disposition Condition    Admit Stable                Emanuel Mas MD  05/18/24 0922       Emanuel Mas MD  05/18/24 1051    "

## 2024-05-18 NOTE — SUBJECTIVE & OBJECTIVE
Past Medical History:   Diagnosis Date    BPH (benign prostatic hypertrophy)     Scrotal mass     Wears glasses        Past Surgical History:   Procedure Laterality Date    EYE SURGERY  BILAT CATARACT WITH LENS    PROSTATE SURGERY      TONSILLECTOMY         Review of patient's allergies indicates:   Allergen Reactions    No known drug allergies        No current facility-administered medications on file prior to encounter.     Current Outpatient Medications on File Prior to Encounter   Medication Sig    [DISCONTINUED] cromolyn (NASALCHROM) 5.2 mg/spray (4 %) nasal spray 1 spray by Nasal route 4 (four) times daily. (Patient not taking: Reported on 6/22/2021)    [DISCONTINUED] docusate sodium (COLACE) 100 MG capsule docusate sodium 100 mg capsule   Take 1 capsule every day by oral route.    [DISCONTINUED] fluticasone propionate (FLONASE) 50 mcg/actuation nasal spray 1 spray (50 mcg total) by Each Nostril route 2 (two) times daily as needed for Rhinitis. (Patient not taking: Reported on 6/22/2021)    [DISCONTINUED] hydrocortisone 2.5 % cream Apply topically 2 (two) times daily.    [DISCONTINUED] polyethylene glycol (GLYCOLAX) 17 gram PwPk Take 17 g by mouth once daily. (Patient not taking: Reported on 6/22/2021)     Family History    None       Tobacco Use    Smoking status: Never    Smokeless tobacco: Never   Substance and Sexual Activity    Alcohol use: No    Drug use: No    Sexual activity: Not on file     Review of Systems   Unable to perform ROS: Dementia   Constitutional:  Negative for fever.   Respiratory:  Positive for cough.    Gastrointestinal:  Positive for nausea and vomiting. Negative for abdominal pain and diarrhea.     Objective:     Vital Signs (Most Recent):  Temp: 97.9 °F (36.6 °C) (05/18/24 1325)  Pulse: (!) 59 (05/18/24 1336)  Resp: 18 (05/18/24 1336)  BP: (!) 166/72 (05/18/24 1325)  SpO2: 95 % (05/18/24 1336) Vital Signs (24h Range):  Temp:  [97.6 °F (36.4 °C)-97.9 °F (36.6 °C)] 97.9 °F (36.6  °C)  Pulse:  [55-77] 59  Resp:  [16-20] 18  SpO2:  [92 %-97 %] 95 %  BP: ()/(55-91) 166/72     Weight: 81.6 kg (180 lb)  Body mass index is 23.11 kg/m².     Physical Exam  Vitals and nursing note reviewed.   Constitutional:       Appearance: He is well-developed.   HENT:      Head: Normocephalic and atraumatic.   Eyes:      Conjunctiva/sclera: Conjunctivae normal.      Pupils: Pupils are equal, round, and reactive to light.   Cardiovascular:      Rate and Rhythm: Regular rhythm. Bradycardia present.      Pulses: Normal pulses.   Pulmonary:      Effort: Pulmonary effort is normal.      Comments: Diminished throughout  Abdominal:      General: Bowel sounds are normal.      Palpations: Abdomen is soft.   Musculoskeletal:         General: Normal range of motion.      Cervical back: Normal range of motion and neck supple.      Right lower leg: Edema present.      Left lower leg: Edema present.      Comments: Bilat LE edema 1+   Skin:     General: Skin is warm and dry.      Capillary Refill: Capillary refill takes less than 2 seconds.   Neurological:      Mental Status: He is alert.      Comments: Oriented to person and place only   Psychiatric:         Behavior: Behavior normal.         Thought Content: Thought content normal.         Judgment: Judgment normal.              CRANIAL NERVES     CN III, IV, VI   Pupils are equal, round, and reactive to light.       Significant Labs: All pertinent labs within the past 24 hours have been reviewed.  CBC:   Recent Labs   Lab 05/18/24  0521   WBC 13.95*   HGB 15.4   HCT 45.8        CMP:   Recent Labs   Lab 05/18/24  0521      K 3.7      CO2 22*   *   BUN 28*   CREATININE 1.2   CALCIUM 8.4*   PROT 6.1   ALBUMIN 3.9   BILITOT 2.9*   ALKPHOS 54*   AST 15   ALT 11   ANIONGAP 11     Lactic Acid:   Recent Labs   Lab 05/18/24  0521 05/18/24  1017   LACTATE 2.8* 2.9*     Troponin:   Recent Labs   Lab 05/18/24  0521   TROPONINIHS 11.8       Significant  Imaging: I have reviewed all pertinent imaging results/findings within the past 24 hours.  EKG: I have reviewed all pertinent results/findings within the past 24 hours and my personal findings are: Accelerated junctional Qtc 519    US Abdomen Limited    Result Date: 5/18/2024  CLINICAL HISTORY: ([id number])89 y/o  (1935) M Hyperbilirubinemia; TECHNIQUE: (A#[id number], exam time 5/18/2024 7:38) US ABDOMEN LIMITED [id number] Right upper quadrant ultrasound, images obtained in grayscale and color. Additional Doppler images of the portal vein were obtained. COMPARISON: None available. FINDINGS: Please note this examination is technically suboptimal due to patient related factors  including body habitus as well as overlying bowel gas. The LIVER is normal in size and contour at 16.5 cm (sagittal right lobe). Hepatic parenchyma has normal echogenicity with normal delineation of the periportal triads. No definite intrahepatic masses are noted. The portal vein is patent with hepatopetal flow . The BILIARY SYSTEM is normal in caliber; the common hepatic duct measures 5 mm. There are is sludge seen in the GALL BLADDER. There is no gallbladder wall thickening, pericholecystic fluid or sonographic Strickland sign to suggest acute cholecystitis. The PANCREATIC head is partially visualized but grossly normal in appearance. The pancreatic duct is not dilated. The right KIDNEY is normal in size at 12.8 x 5.9 x 4.8 cm and echogenicity/texture.  There is mild right-sided hydronephrosis.  No shadowing stones, contour deforming renal mass or cyst is identified. The AORTA and IVC are partially visualized and unremarkable.     1.  Mild right-sided hydronephrosis. 2.  Sludge in the gallbladder without findings of acute cholecystitis. 3.  No intra/extrahepatic biliary ductal dilation. . Electronically signed by: Colby Guerrero Date:    05/18/2024 Time:    07:40    X-Ray Chest AP Portable    Result Date: 5/18/2024  EXAMINATION: XR CHEST AP  PORTABLE CLINICAL HISTORY: Sepsis; TECHNIQUE: Single frontal view of the chest was performed. COMPARISON: Chest radiograph performed 04/16/2020, 09:06 hours. FINDINGS: Monitoring leads overlie the chest.  Cardiomediastinal contours grossly unchanged.  Atherosclerosis of the aorta. Patchy interstitial predominant opacities are noted bilaterally. No definite pneumothorax or large volume pleural effusion. No acute findings in the visualized abdomen. Osseous and soft tissue structures appear without definite acute change.     Patchy interstitial predominant opacities bilaterally could relate to edema versus infectious or non infectious inflammatory process. Electronically signed by: Conor Stokes Date:    05/18/2024 Time:    06:28    CT Head Without Contrast    Result Date: 5/18/2024  EXAMINATION: CT HEAD WITHOUT CONTRAST CLINICAL HISTORY: Mental status change, unknown cause; TECHNIQUE: Low dose axial images were obtained through the head.  Coronal and sagittal reformations were also performed. Contrast was not administered. COMPARISON: MRI brain performed 04/16/2020. CT head 04/16/2020 FINDINGS: Blood: No acute intracranial hemorrhage. Parenchyma: No definite loss of gray-white differentiation to suggest acute or subacute transcortical infarct. Generalized pattern age-related parenchymal volume loss.  Nonspecific areas of white matter hypoattenuation may reflect sequela of chronic small vessel ischemic disease.  Lacunar type insults involve the deep gray nuclei. Ventricles/Extra-axial spaces: No abnormal extra-axial fluid collection. Basal cisterns are patent. Vessels: Mild atherosclerotic calcifications. Orbits: Status post bilateral lens replacements. Scalp: Unremarkable. Skull: There are no depressed skull fractures or destructive bone lesions. Sinuses and mastoids: Scattered paranasal sinus mucosal thickening.  Near complete chronic appearing opacification of the right sphenoid sinus with osteitis of the sinus  howard.  Relatively modest paranasal sinus mucosal thickening elsewhere. Other findings: None     No definite acute intracranial findings by noncontrast CT. Electronically signed by: Conor Stokes Date:    05/18/2024 Time:    06:27

## 2024-05-18 NOTE — ASSESSMENT & PLAN NOTE
Patient has a diagnosis of pneumonia. The cause of the pneumonia is suspected to be bacterial in etiology but organism is not known. The pneumonia is stable. The patient has the following signs/symptoms of pneumonia: cough. The patient does not have a current oxygen requirement and the patient does not have a home oxygen requirement. I have reviewed the pertinent imaging. The following cultures have been collected: Blood cultures The culture results are listed below.     Current antimicrobial regimen consists of the antibiotics listed below. Will monitor patient closely and continue current treatment plan unchanged.    Antibiotics (From admission, onward)      Start     Stop Route Frequency Ordered    05/18/24 1500  piperacillin-tazobactam 3.375 g in dextrose 5 % 100 mL IVPB (ready to mix)         -- IV Every 8 hours (non-standard times) 05/18/24 1349            Microbiology Results (last 7 days)       Procedure Component Value Units Date/Time    Blood culture x two cultures. Draw prior to antibiotics. [[id number]] Collected: 05/18/24 0555    Order Status: Completed Specimen: Blood from Peripheral, Hand, Right Updated: 05/18/24 1158     Blood Culture, Routine No Growth to date    Narrative:      Aerobic and anaerobic  Collection has been rescheduled by [facility] at 05/18/2024 05:42 Reason:   Pt in scan/will wait  Collection has been rescheduled by [facility] at 05/18/2024 05:42 Reason:   Pt in scan/will wait    Blood culture x two cultures. Draw prior to antibiotics. [[id number]] Collected: 05/18/24 0554    Order Status: Completed Specimen: Blood from Peripheral, Hand, Left Updated: 05/18/24 1158     Blood Culture, Routine No Growth to date    Narrative:      Aerobic and anaerobic  Collection has been rescheduled by [facility] at 05/18/2024 05:42 Reason:   Pt in scan/will wait  Collection has been rescheduled by [facility] at 05/18/2024 05:42 Reason:   Pt in scan/will wait            Suspect aspiration   Initially started on  rocephin/azithro in ED, would hold off on further azithro given Qtc prolongation

## 2024-05-18 NOTE — ASSESSMENT & PLAN NOTE
Present over the last few years, but a little worse which may be d/t mild dehydration  Gilbert's?  Abd US with CBD WNL  Does have macrocytosis - alcohol?  Check ethanol levels  Get PT/INR  Trend CMP

## 2024-05-19 PROBLEM — R41.89 COGNITIVE IMPAIRMENT: Status: ACTIVE | Noted: 2024-05-19

## 2024-05-19 LAB
ALBUMIN SERPL BCP-MCNC: 3.2 G/DL (ref 3.5–5.2)
ALP SERPL-CCNC: 41 U/L (ref 55–135)
ALT SERPL W/O P-5'-P-CCNC: 9 U/L (ref 10–44)
ANION GAP SERPL CALC-SCNC: 4 MMOL/L (ref 8–16)
AST SERPL-CCNC: 13 U/L (ref 10–40)
BASOPHILS # BLD AUTO: 0.02 K/UL (ref 0–0.2)
BASOPHILS NFR BLD: 0.3 % (ref 0–1.9)
BILIRUB SERPL-MCNC: 1.5 MG/DL (ref 0.1–1)
BUN SERPL-MCNC: 18 MG/DL (ref 8–23)
CALCIUM SERPL-MCNC: 7.8 MG/DL (ref 8.7–10.5)
CHLORIDE SERPL-SCNC: 105 MMOL/L (ref 95–110)
CO2 SERPL-SCNC: 28 MMOL/L (ref 23–29)
CREAT SERPL-MCNC: 1 MG/DL (ref 0.5–1.4)
DIFFERENTIAL METHOD BLD: ABNORMAL
EOSINOPHIL # BLD AUTO: 0.1 K/UL (ref 0–0.5)
EOSINOPHIL NFR BLD: 1 % (ref 0–8)
ERYTHROCYTE [DISTWIDTH] IN BLOOD BY AUTOMATED COUNT: 14.7 % (ref 11.5–14.5)
EST. GFR  (NO RACE VARIABLE): >60 ML/MIN/1.73 M^2
GLUCOSE SERPL-MCNC: 89 MG/DL (ref 70–110)
HCT VFR BLD AUTO: 40.9 % (ref 40–54)
HGB BLD-MCNC: 13.4 G/DL (ref 14–18)
IMM GRANULOCYTES # BLD AUTO: 0.03 K/UL (ref 0–0.04)
IMM GRANULOCYTES NFR BLD AUTO: 0.4 % (ref 0–0.5)
LACTATE SERPL-SCNC: 1.5 MMOL/L (ref 0.5–1.9)
LACTATE SERPL-SCNC: 1.8 MMOL/L (ref 0.5–1.9)
LACTATE SERPL-SCNC: 2.5 MMOL/L (ref 0.5–1.9)
LYMPHOCYTES # BLD AUTO: 1.1 K/UL (ref 1–4.8)
LYMPHOCYTES NFR BLD: 14.2 % (ref 18–48)
MAGNESIUM SERPL-MCNC: 2 MG/DL (ref 1.6–2.6)
MCH RBC QN AUTO: 33.1 PG (ref 27–31)
MCHC RBC AUTO-ENTMCNC: 32.8 G/DL (ref 32–36)
MCV RBC AUTO: 101 FL (ref 82–98)
MONOCYTES # BLD AUTO: 0.7 K/UL (ref 0.3–1)
MONOCYTES NFR BLD: 9.3 % (ref 4–15)
NEUTROPHILS # BLD AUTO: 5.7 K/UL (ref 1.8–7.7)
NEUTROPHILS NFR BLD: 74.8 % (ref 38–73)
NRBC BLD-RTO: 0 /100 WBC
OHS QRS DURATION: 84 MS
OHS QRS DURATION: 94 MS
OHS QRS DURATION: 96 MS
OHS QRS DURATION: 96 MS
OHS QRS DURATION: 98 MS
OHS QTC CALCULATION: 427 MS
OHS QTC CALCULATION: 430 MS
OHS QTC CALCULATION: 435 MS
OHS QTC CALCULATION: 475 MS
OHS QTC CALCULATION: 513 MS
PLATELET # BLD AUTO: 148 K/UL (ref 150–450)
PMV BLD AUTO: 10.5 FL (ref 9.2–12.9)
POTASSIUM SERPL-SCNC: 4.4 MMOL/L (ref 3.5–5.1)
PROT SERPL-MCNC: 5.2 G/DL (ref 6–8.4)
RBC # BLD AUTO: 4.05 M/UL (ref 4.6–6.2)
SODIUM SERPL-SCNC: 137 MMOL/L (ref 136–145)
WBC # BLD AUTO: 7.66 K/UL (ref 3.9–12.7)

## 2024-05-19 PROCEDURE — 25000242 PHARM REV CODE 250 ALT 637 W/ HCPCS: Performed by: NURSE PRACTITIONER

## 2024-05-19 PROCEDURE — 99900031 HC PATIENT EDUCATION (STAT)

## 2024-05-19 PROCEDURE — 36415 COLL VENOUS BLD VENIPUNCTURE: CPT | Performed by: INTERNAL MEDICINE

## 2024-05-19 PROCEDURE — 96372 THER/PROPH/DIAG INJ SC/IM: CPT | Performed by: NURSE PRACTITIONER

## 2024-05-19 PROCEDURE — 94640 AIRWAY INHALATION TREATMENT: CPT | Mod: XB

## 2024-05-19 PROCEDURE — 83735 ASSAY OF MAGNESIUM: CPT | Performed by: NURSE PRACTITIONER

## 2024-05-19 PROCEDURE — 85025 COMPLETE CBC W/AUTO DIFF WBC: CPT | Performed by: NURSE PRACTITIONER

## 2024-05-19 PROCEDURE — 83605 ASSAY OF LACTIC ACID: CPT | Mod: 91 | Performed by: INTERNAL MEDICINE

## 2024-05-19 PROCEDURE — 93010 ELECTROCARDIOGRAM REPORT: CPT | Mod: ,,, | Performed by: INTERNAL MEDICINE

## 2024-05-19 PROCEDURE — 80053 COMPREHEN METABOLIC PANEL: CPT | Performed by: NURSE PRACTITIONER

## 2024-05-19 PROCEDURE — 25000003 PHARM REV CODE 250: Performed by: NURSE PRACTITIONER

## 2024-05-19 PROCEDURE — 83605 ASSAY OF LACTIC ACID: CPT | Performed by: HOSPITALIST

## 2024-05-19 PROCEDURE — 96372 THER/PROPH/DIAG INJ SC/IM: CPT | Performed by: INTERNAL MEDICINE

## 2024-05-19 PROCEDURE — 36415 COLL VENOUS BLD VENIPUNCTURE: CPT | Performed by: HOSPITALIST

## 2024-05-19 PROCEDURE — 93005 ELECTROCARDIOGRAM TRACING: CPT | Performed by: INTERNAL MEDICINE

## 2024-05-19 PROCEDURE — 63600175 PHARM REV CODE 636 W HCPCS: Performed by: INTERNAL MEDICINE

## 2024-05-19 PROCEDURE — 94761 N-INVAS EAR/PLS OXIMETRY MLT: CPT

## 2024-05-19 PROCEDURE — 99223 1ST HOSP IP/OBS HIGH 75: CPT | Mod: ,,, | Performed by: INTERNAL MEDICINE

## 2024-05-19 PROCEDURE — 25000003 PHARM REV CODE 250: Performed by: INTERNAL MEDICINE

## 2024-05-19 PROCEDURE — G0378 HOSPITAL OBSERVATION PER HR: HCPCS

## 2024-05-19 PROCEDURE — 93010 ELECTROCARDIOGRAM REPORT: CPT | Mod: 76,,, | Performed by: INTERNAL MEDICINE

## 2024-05-19 PROCEDURE — 63600175 PHARM REV CODE 636 W HCPCS: Performed by: NURSE PRACTITIONER

## 2024-05-19 RX ORDER — ZIPRASIDONE MESYLATE 20 MG/ML
10 INJECTION, POWDER, LYOPHILIZED, FOR SOLUTION INTRAMUSCULAR ONCE
Status: COMPLETED | OUTPATIENT
Start: 2024-05-19 | End: 2024-05-19

## 2024-05-19 RX ORDER — QUETIAPINE FUMARATE 25 MG/1
25 TABLET, FILM COATED ORAL ONCE
Status: COMPLETED | OUTPATIENT
Start: 2024-05-19 | End: 2024-05-19

## 2024-05-19 RX ORDER — DIAZEPAM 10 MG/2ML
2.5 INJECTION INTRAMUSCULAR
Status: COMPLETED | OUTPATIENT
Start: 2024-05-19 | End: 2024-05-19

## 2024-05-19 RX ORDER — QUETIAPINE FUMARATE 25 MG/1
25 TABLET, FILM COATED ORAL NIGHTLY
Status: DISCONTINUED | OUTPATIENT
Start: 2024-05-19 | End: 2024-05-19

## 2024-05-19 RX ORDER — SODIUM CHLORIDE 9 MG/ML
INJECTION, SOLUTION INTRAVENOUS CONTINUOUS
Status: ACTIVE | OUTPATIENT
Start: 2024-05-19 | End: 2024-05-19

## 2024-05-19 RX ORDER — DIAZEPAM 10 MG/2ML
2.5 INJECTION INTRAMUSCULAR
Status: DISCONTINUED | OUTPATIENT
Start: 2024-05-19 | End: 2024-05-21 | Stop reason: HOSPADM

## 2024-05-19 RX ORDER — ZIPRASIDONE MESYLATE 20 MG/ML
5 INJECTION, POWDER, LYOPHILIZED, FOR SOLUTION INTRAMUSCULAR ONCE
Status: COMPLETED | OUTPATIENT
Start: 2024-05-20 | End: 2024-05-19

## 2024-05-19 RX ADMIN — ZIPRASIDONE MESYLATE 5 MG: 20 INJECTION, POWDER, LYOPHILIZED, FOR SOLUTION INTRAMUSCULAR at 11:05

## 2024-05-19 RX ADMIN — IPRATROPIUM BROMIDE AND ALBUTEROL SULFATE 3 ML: 2.5; .5 SOLUTION RESPIRATORY (INHALATION) at 09:05

## 2024-05-19 RX ADMIN — PIPERACILLIN SODIUM AND TAZOBACTAM SODIUM 3.38 G: 3; .375 INJECTION, POWDER, LYOPHILIZED, FOR SOLUTION INTRAVENOUS at 02:05

## 2024-05-19 RX ADMIN — ZIPRASIDONE MESYLATE 10 MG: 20 INJECTION, POWDER, LYOPHILIZED, FOR SOLUTION INTRAMUSCULAR at 07:05

## 2024-05-19 RX ADMIN — FOLIC ACID 1 MG: 1 TABLET ORAL at 08:05

## 2024-05-19 RX ADMIN — ENOXAPARIN SODIUM 40 MG: 40 INJECTION SUBCUTANEOUS at 05:05

## 2024-05-19 RX ADMIN — IPRATROPIUM BROMIDE AND ALBUTEROL SULFATE 3 ML: 2.5; .5 SOLUTION RESPIRATORY (INHALATION) at 02:05

## 2024-05-19 RX ADMIN — ACETAMINOPHEN 650 MG: 325 TABLET ORAL at 05:05

## 2024-05-19 RX ADMIN — PIPERACILLIN SODIUM AND TAZOBACTAM SODIUM 3.38 G: 3; .375 INJECTION, POWDER, LYOPHILIZED, FOR SOLUTION INTRAVENOUS at 08:05

## 2024-05-19 RX ADMIN — IPRATROPIUM BROMIDE AND ALBUTEROL SULFATE 3 ML: 2.5; .5 SOLUTION RESPIRATORY (INHALATION) at 07:05

## 2024-05-19 RX ADMIN — PIPERACILLIN SODIUM AND TAZOBACTAM SODIUM 3.38 G: 3; .375 INJECTION, POWDER, LYOPHILIZED, FOR SOLUTION INTRAVENOUS at 10:05

## 2024-05-19 RX ADMIN — Medication 6 MG: at 08:05

## 2024-05-19 RX ADMIN — DIAZEPAM 2.5 MG: 5 INJECTION, SOLUTION INTRAMUSCULAR; INTRAVENOUS at 07:05

## 2024-05-19 RX ADMIN — DIAZEPAM 2.5 MG: 5 INJECTION, SOLUTION INTRAMUSCULAR; INTRAVENOUS at 09:05

## 2024-05-19 RX ADMIN — QUETIAPINE 25 MG: 25 TABLET ORAL at 05:05

## 2024-05-19 RX ADMIN — THIAMINE HCL TAB 100 MG 100 MG: 100 TAB at 08:05

## 2024-05-19 RX ADMIN — SENNOSIDES AND DOCUSATE SODIUM 1 TABLET: 50; 8.6 TABLET ORAL at 08:05

## 2024-05-19 RX ADMIN — SODIUM CHLORIDE: 9 INJECTION, SOLUTION INTRAVENOUS at 02:05

## 2024-05-19 NOTE — ASSESSMENT & PLAN NOTE
Multifactorial  Infection and bradycardia likely contributing   -bradycardia improved  -patient back to baseline, mental status AAO X2

## 2024-05-19 NOTE — ASSESSMENT & PLAN NOTE
Admit to med/tele  This patient does have evidence of infective focus  My overall impression is sepsis.  Source: Respiratory  Antibiotics given-   Antibiotics (72h ago, onward)      Start     Stop Route Frequency Ordered    05/18/24 1500  piperacillin-tazobactam 3.375 g in dextrose 5 % 100 mL IVPB (ready to mix)         -- IV Every 8 hours (non-standard times) 05/18/24 1349          Latest lactate reviewed-  Recent Labs   Lab 05/19/24  0404   LACTATE 1.5       Organ dysfunction indicated by Acute kidney injury    Fluid challenge: received 1L bolus, MAP never dropped below 65, getting LR at 130mL for now     Post- resuscitation assessment No - Post resuscitation assessment not needed       Will Not start Pressors- Levophed for MAP of 65  Source control achieved by: abx    -continue with Zosyn  -low probability of UTI  -blood cultures pending  -vitals within normal limits.    -Lactic acid 1.5  -source possibly due to aspiration pneumonia.  -we will order swallow evaluation, to rule out aspiration

## 2024-05-19 NOTE — CONSULTS
[facility]  Department of Cardiology  Consult Note      PATIENT NAME: Elpidio Ayala  MRN: [id number]  TODAY'S DATE: 05/19/2024  ADMIT DATE: 5/18/2024                          CONSULT REQUESTED BY: Desean Cohen MD    SUBJECTIVE     PRINCIPAL PROBLEM: Sepsis    REASON FOR CONSULT:  Symptomatic bradycardia    HPI:  Mr. Moreno is an 89-year-old male with history of BPH and dementia who was brought into the ER by his brother with complaints of increasing we worsened weakness, hypotension, and confusion.  Emesis x2 at home, found to be hypotensive in ER at 90/60.  Troponins negative, .  He was initially given some fluids in the ER for the hypotension but they have since been stopped - hypotension resolved.  Echo yesterday shows ejection fraction of 50-55%.  EKG done this morning shows sinus rhythm with first-degree AV block and PACs with a rate of 67.     Her hospital medicine notes:  Mr. Ayala is an 89 year old male with a history of BPH and dementia who presents today with complaints of AMS per his brother who went to check on him a 4 am. It is moderate. It is associated with episode of vomiting and nonproductive cough. He denies fever, chills, diarrhea, or LOC. History is limited d/t underlying dementia but is supplemented by brother who is at bedside. He lives alone and is normally oriented to person and place and somewhat situation. This morning he was only oriented to person and was slow to respond prompting his presentation. BP was soft on arrival 99/55 HR 62 satting 92% or better on room air. He was given 1L fluid bolus now with mild hypertension /72. CXR revealed: patchy interstitial opacities, WBC 13.9K, macrocytosis, lactate 2.8, procal 0.5, , highly sensitive troponin WNL, T bili 2.9 (with chronic elevation), UA concentrated but bland, CT head with no acute intracranial abnormalities, abd US Mild right-sided hydronephrosis, Sludge in the gallbladder without findings of  acute cholecystitis. Initial EKG: accelerated junctional with Qtc 519. Hospital medicine is consulted for admission for sepsis.         Review of patient's allergies indicates:   Allergen Reactions    No known drug allergies        Past Medical History:   Diagnosis Date    BPH (benign prostatic hypertrophy)     Scrotal mass     Wears glasses      Past Surgical History:   Procedure Laterality Date    EYE SURGERY  BILAT CATARACT WITH LENS    PROSTATE SURGERY      TONSILLECTOMY       Social History     Tobacco Use    Smoking status: Never    Smokeless tobacco: Never   Substance Use Topics    Alcohol use: No    Drug use: No        REVIEW OF SYSTEMS    As mentioned in HPI    OBJECTIVE     VITAL SIGNS (Most Recent)  Temp: 98.2 °F (36.8 °C) (05/19/24 0702)  Pulse: (!) 54 (05/19/24 0724)  Resp: 12 (05/19/24 0724)  BP: (!) 157/74 (05/19/24 0600)  SpO2: 97 % (05/19/24 0724)    VENTILATION STATUS  Resp: 12 (05/19/24 0724)  SpO2: 97 % (05/19/24 0724)           I & O (Last 24H):  Intake/Output Summary (Last 24 hours) at 5/19/2024 0921  Last data filed at 5/19/2024 0600  Gross per 24 hour   Intake 3371.83 ml   Output 1950 ml   Net 1421.83 ml       WEIGHTS  Wt Readings from Last 3 Encounters:   05/19/24 0400 82.3 kg (181 lb 7 oz)   05/18/24 1325 81.6 kg (180 lb)   05/18/24 0513 81.6 kg (180 lb)   05/18/24 1349 81.6 kg (180 lb)   07/14/21 1550 95.3 kg (210 lb)       PHYSICAL EXAM    CONSTITUTIONAL: NAD, elderly frail male  HEENT: Normocephalic. + pallor  NECK: no JVD  LUNGS: crackles   HEART:  Sinus rhythm with first-degree AV block, S1, S2 normal, + murmur   ABDOMEN: soft, non-tender, bowel sounds normal  EXTREMITIES: No edema  SKIN: No rash  NEURO: AAO X 3  PSYCH: normal affect     HOME MEDICATIONS:  No current facility-administered medications on file prior to encounter.     No current outpatient medications on file prior to encounter.       SCHEDULED MEDS:   albuterol-ipratropium  3 mL Nebulization Q6H WAKE    cyanocobalamin   1,000 mcg Intramuscular Q30 Days    enoxparin  40 mg Subcutaneous Daily    folic acid  1 mg Oral Daily    piperacillin-tazobactam (Zosyn) IV (PEDS and ADULTS) (extended infusion is not appropriate)  3.375 g Intravenous Q8H    senna-docusate 8.6-50 mg  1 tablet Oral BID    thiamine  100 mg Oral Daily       CONTINUOUS INFUSIONS:    PRN MEDS:  Current Facility-Administered Medications:     acetaminophen, 650 mg, Oral, Q8H PRN    acetaminophen, 650 mg, Oral, Q4H PRN    aluminum-magnesium hydroxide-simethicone, 30 mL, Oral, QID PRN    dextrose 50%, 12.5 g, Intravenous, PRN    dextrose 50%, 25 g, Intravenous, PRN    glucagon (human recombinant), 1 mg, Intramuscular, PRN    glucose, 16 g, Oral, PRN    glucose, 24 g, Oral, PRN    magnesium oxide, 800 mg, Oral, PRN    magnesium oxide, 800 mg, Oral, PRN    melatonin, 6 mg, Oral, Nightly PRN    naloxone, 0.02 mg, Intravenous, PRN    ondansetron, 4 mg, Intravenous, Q6H PRN    polyethylene glycol, 17 g, Oral, BID PRN    potassium bicarbonate, 35 mEq, Oral, PRN    potassium bicarbonate, 50 mEq, Oral, PRN    potassium bicarbonate, 60 mEq, Oral, PRN    sodium chloride 0.9%, 10 mL, Intravenous, Q12H PRN    LABS AND DIAGNOSTICS     CBC LAST 3 DAYS  Recent Labs   Lab 05/18/24  0521 05/19/24  0404   WBC 13.95* 7.66   RBC 4.60 4.05*   HGB 15.4 13.4*   HCT 45.8 40.9   * 101*   MCH 33.5* 33.1*   MCHC 33.6 32.8   RDW 14.4 14.7*    148*   MPV 10.6 10.5   GRAN 90.5*  12.6* 74.8*  5.7   LYMPH 5.4*  0.8* 14.2*  1.1   MONO 3.6*  0.5 9.3  0.7   BASO 0.02 0.02   NRBC 0 0       COAGULATION LAST 3 DAYS  Recent Labs   Lab 05/18/24  1458   INR 1.1       CHEMISTRY LAST 3 DAYS  Recent Labs   Lab 05/18/24  0521 05/19/24  0404    137   K 3.7 4.4    105   CO2 22* 28   ANIONGAP 11 4*   BUN 28* 18   CREATININE 1.2 1.0   * 89   CALCIUM 8.4* 7.8*   MG 1.8 2.0   ALBUMIN 3.9 3.2*   PROT 6.1 5.2*   ALKPHOS 54* 41*   ALT 11 9*   AST 15 13   BILITOT 2.9* 1.5*       CARDIAC  "PROFILE LAST 3 DAYS  Recent Labs   Lab 05/18/24  0521 05/18/24  1458   *  --    TROPONINIHS 11.8 17.3*       ENDOCRINE LAST 3 DAYS  Recent Labs   Lab 05/18/24  0521 05/18/24  1458   TSH  --  0.942   PROCAL 0.514*  --        LAST ARTERIAL BLOOD GAS  ABG  No results for input(s): "PH", "PO2", "PCO2", "HCO3", "BE" in the last 168 hours.    LAST 7 DAYS MICROBIOLOGY   Microbiology Results (last 7 days)       Procedure Component Value Units Date/Time    Blood culture x two cultures. Draw prior to antibiotics. [[id number]] Collected: 05/18/24 0554    Order Status: Completed Specimen: Blood from Peripheral, Hand, Left Updated: 05/19/24 0632     Blood Culture, Routine No Growth to date      No Growth to date    Narrative:      Aerobic and anaerobic  Collection has been rescheduled by [facility] at 05/18/2024 05:42 Reason:   Pt in scan/will wait  Collection has been rescheduled by [facility] at 05/18/2024 05:42 Reason:   Pt in scan/will wait    Blood culture x two cultures. Draw prior to antibiotics. [[id number]] Collected: 05/18/24 0555    Order Status: Completed Specimen: Blood from Peripheral, Hand, Right Updated: 05/19/24 0632     Blood Culture, Routine No Growth to date      No Growth to date    Narrative:      Aerobic and anaerobic  Collection has been rescheduled by [facility] at 05/18/2024 05:42 Reason:   Pt in scan/will wait  Collection has been rescheduled by [facility] at 05/18/2024 05:42 Reason:   Pt in scan/will wait            MOST RECENT IMAGING  Echo    Left Ventricle: The left ventricle is normal in size. Normal wall   thickness. Unable to assess wall motion. There is low normal systolic   function with a visually estimated ejection fraction of 50 - 55%.    Right Ventricle: Right ventricle was not well visualized due to poor   acoustic window. Systolic function is normal.    Aortic Valve: The aortic valve is a trileaflet valve. There is moderate   aortic regurgitation.    Mitral Valve: There is no stenosis. The mean pressure " gradient across   the mitral valve is 3 mmHg at a heart rate of  bpm. There is mild   regurgitation.    Tricuspid Valve: There is mild regurgitation.  US Abdomen Limited  Narrative: CLINICAL HISTORY:  ([id number])88 y/o  (1935) M    Hyperbilirubinemia;    TECHNIQUE:  (A#[id number], exam time 5/18/2024 7:38)    US ABDOMEN LIMITED [id number]    Right upper quadrant ultrasound, images obtained in grayscale and color. Additional Doppler images of the portal vein were obtained.    COMPARISON:  None available.    FINDINGS:  Please note this examination is technically suboptimal due to patient related factors  including body habitus as well as overlying bowel gas.    The LIVER is normal in size and contour at 16.5 cm (sagittal right lobe). Hepatic parenchyma has normal echogenicity with normal delineation of the periportal triads. No definite intrahepatic masses are noted. The portal vein is patent with hepatopetal flow .    The BILIARY SYSTEM is normal in caliber; the common hepatic duct measures 5 mm. There are is sludge seen in the GALL BLADDER. There is no gallbladder wall thickening, pericholecystic fluid or sonographic Strickland sign to suggest acute cholecystitis.    The PANCREATIC head is partially visualized but grossly normal in appearance. The pancreatic duct is not dilated.    The right KIDNEY is normal in size at 12.8 x 5.9 x 4.8 cm and echogenicity/texture.  There is mild right-sided hydronephrosis.  No shadowing stones, contour deforming renal mass or cyst is identified.    The AORTA and IVC are partially visualized and unremarkable.  Impression: 1.  Mild right-sided hydronephrosis.    2.  Sludge in the gallbladder without findings of acute cholecystitis.    3.  No intra/extrahepatic biliary ductal dilation.    .    Electronically signed by: Colby Guerrero  Date:    05/18/2024  Time:    07:40  X-Ray Chest AP Portable  Narrative: EXAMINATION:  XR CHEST AP PORTABLE    CLINICAL  HISTORY:  Sepsis;    TECHNIQUE:  Single frontal view of the chest was performed.    COMPARISON:  Chest radiograph performed 04/16/2020, 09:06 hours.    FINDINGS:  Monitoring leads overlie the chest.  Cardiomediastinal contours grossly unchanged.  Atherosclerosis of the aorta.    Patchy interstitial predominant opacities are noted bilaterally.    No definite pneumothorax or large volume pleural effusion.    No acute findings in the visualized abdomen.    Osseous and soft tissue structures appear without definite acute change.  Impression: Patchy interstitial predominant opacities bilaterally could relate to edema versus infectious or non infectious inflammatory process.    Electronically signed by: Conor Stokes  Date:    05/18/2024  Time:    06:28  CT Head Without Contrast  Narrative: EXAMINATION:  CT HEAD WITHOUT CONTRAST    CLINICAL HISTORY:  Mental status change, unknown cause;    TECHNIQUE:  Low dose axial images were obtained through the head.  Coronal and sagittal reformations were also performed. Contrast was not administered.    COMPARISON:  MRI brain performed 04/16/2020. CT head 04/16/2020    FINDINGS:  Blood: No acute intracranial hemorrhage.    Parenchyma: No definite loss of gray-white differentiation to suggest acute or subacute transcortical infarct. Generalized pattern age-related parenchymal volume loss.  Nonspecific areas of white matter hypoattenuation may reflect sequela of chronic small vessel ischemic disease.  Lacunar type insults involve the deep gray nuclei.    Ventricles/Extra-axial spaces: No abnormal extra-axial fluid collection. Basal cisterns are patent.    Vessels: Mild atherosclerotic calcifications.    Orbits: Status post bilateral lens replacements.    Scalp: Unremarkable.    Skull: There are no depressed skull fractures or destructive bone lesions.    Sinuses and mastoids: Scattered paranasal sinus mucosal thickening.  Near complete chronic appearing opacification of the right  sphenoid sinus with osteitis of the sinus walls.  Relatively modest paranasal sinus mucosal thickening elsewhere.    Other findings: None  Impression: No definite acute intracranial findings by noncontrast CT.    Electronically signed by: Conor Stokes  Date:    05/18/2024  Time:    06:27      ECHOCARDIOGRAM RESULTS (last 5)  Results for orders placed during the hospital encounter of 05/18/24    Echo    Interpretation Summary    Left Ventricle: The left ventricle is normal in size. Normal wall thickness. Unable to assess wall motion. There is low normal systolic function with a visually estimated ejection fraction of 50 - 55%.    Right Ventricle: Right ventricle was not well visualized due to poor acoustic window. Systolic function is normal.    Aortic Valve: The aortic valve is a trileaflet valve. There is moderate aortic regurgitation.    Mitral Valve: There is no stenosis. The mean pressure gradient across the mitral valve is 3 mmHg at a heart rate of  bpm. There is mild regurgitation.    Tricuspid Valve: There is mild regurgitation.      CURRENT/PREVIOUS VISIT EKG  Results for orders placed or performed during the hospital encounter of 05/18/24   EKG 12-lead    Collection Time: 05/19/24  8:15 AM   Result Value Ref Range    QRS Duration 96 ms    OHS QTC Calculation 475 ms    Narrative    Test Reason : I49.9,    Vent. Rate : 067 BPM     Atrial Rate : 067 BPM     P-R Int : 352 ms          QRS Dur : 096 ms      QT Int : 450 ms       P-R-T Axes : 068 051 069 degrees     QTc Int : 475 ms    Sinus rhythm with 1st degree A-V block with Premature atrial complexes  Otherwise normal ECG  When compared with ECG of 19-MAY-2024 08:14,  Sinus rhythm has replaced Atrial fibrillation  QT has lengthened    Referred By: [email or web address]   SELF           Confirmed By:            ASSESSMENT/PLAN:     Active Hospital Problems    Diagnosis    *Sepsis    Cognitive impairment    Encephalopathy, metabolic    Pneumonia    Elevated bilirubin     BPH (benign prostatic hyperplasia)    Symptomatic bradycardia    Macrocytosis       ASSESSMENT & PLAN:     Sepsis  Symptomatic bradycardia  Cognitive impairment  Metabolic encephalopathy  Pneumonia  Elevated bilirubin  BPH  Macrocytosis      RECOMMENDATIONS:    Would like to monitor overnight with continuous telemetry.  Patient can be transferred of ICU to step-down.  Could consider permanent pacemaker placement if indicated.  We will plan to reach out to main [place] tomorrow to discuss the case with the electrophysiologist.   Echo done yesterday shows normal systolic function with EF of 50-50% with moderate aortic regurgitation.   We will continue to follow at this time, thank you for this consult.    Antonette Kulkarni NP  Department of Cardiology  Date of Service: 05/19/2024

## 2024-05-19 NOTE — ASSESSMENT & PLAN NOTE
Present over the last few years, but a little worse which may be d/t mild dehydration  Gilbert's?  Abd US with CBD WNL  -Does have macrocytosis - alcohol?  Patient and brother denied history of alcoholism  -Ethanol levels <10  -PT/INR wnl  -Trend CMP  -negative for Strickland sign

## 2024-05-19 NOTE — PLAN OF CARE
[facility]  Initial Discharge Assessment       Primary Care Provider: Cali Win Jr., MD    Admission Diagnosis: Dehydration [E86.0]  Symptomatic bradycardia [R00.1]    Admission Date: 5/18/2024  Expected Discharge Date:     Presented at pt's bedside to complete discharge assessment with pt's brother . Pt has advanced dementia . Demographics, PCP, and insurance verified. Pt informed No home health, No dialysis, No Coumadin therapy. Pt's brother reports ability to complete ADLs without any  assistance. Pt's brother verbalized plan to discharge to possible facility or home  via brother transport. Pt has no other needs to be addressed at this time.        Transition of Care Barriers: None (Limited family support)    Payor: MEDICARE / Plan: MEDICARE RAILROAD RETIREMENT / Product Type: Government /     Extended Emergency Contact Information  Primary Emergency Contact: Celestine Ayala  Address: UNKNOWN   [place]  Mobile Phone: [phone]  Relation: Brother  Preferred language: English   needed? No    Discharge Plan A: Home Health  Discharge Plan B: Home with family      [id number] [place] - [address]  [address]  [place] [address]  Phone: [phone] Fax: [phone]      Initial Assessment (most recent)       Adult Discharge Assessment - 05/19/24 1213          Discharge Assessment    Assessment Type Discharge Planning Assessment     Confirmed/corrected address, phone number and insurance Yes     Confirmed Demographics Correct on Facesheet     Source of Information family;health record     Reason For Admission Sepsis     People in Home alone     Facility Arrived From: home     Do you expect to return to your current living situation? No     Do you have help at home or someone to help you manage your care at home? Yes     Who are your caregiver(s) and their phone number(s)? Celestine Ayala (Brother)  [phone] (Mobile     Prior to hospitilization  cognitive status: Not Oriented to Person;Not Oriented to Place;Not Oriented to Time     Current cognitive status: Not Oriented to Person;Not Oriented to Place;Not Oriented to Time     Walking or Climbing Stairs Difficulty no     Dressing/Bathing Difficulty no     Home Layout Able to live on 1st floor     Equipment Currently Used at Home none     Readmission within 30 days? No     Patient currently being followed by outpatient case management? No     Do you currently have service(s) that help you manage your care at home? No     Do you take prescription medications? No     Do you have prescription coverage? Yes     Coverage Medicare and  [employer]     Do you have any problems affording any of your prescribed medications? No     Who is going to help you get home at discharge? Celestine Ayala (Brother)  [phone] (Mobile)     How do you get to doctors appointments? family or friend will provide     Are you on dialysis? No     Do you take coumadin? No     Discharge Plan A Home Health     Discharge Plan B Home with family     DME Needed Upon Discharge  none     Discharge Plan discussed with: Sibling     Name(s) and Number(s) Celestine Ayala (Brother)  [phone] (Mobile)     Transition of Care Barriers None   Limited family support    SDOH Lack of primary/family support

## 2024-05-19 NOTE — RESPIRATORY THERAPY
05/18/24 1938   Patient Assessment/Suction   Level of Consciousness (AVPU) alert   Respiratory Effort Normal;Unlabored   Expansion/Accessory Muscles/Retractions no retractions;no use of accessory muscles   All Lung Fields Breath Sounds diminished   Rhythm/Pattern, Respiratory pattern regular;unlabored   Cough Frequency infrequent   Cough Type nonproductive   PRE-TX-O2   Device (Oxygen Therapy) room air   SpO2 95 %   Pulse Oximetry Type Continuous   $ Pulse Oximetry - Multiple Charge Pulse Oximetry - Multiple   Pulse 63   Resp 18   Aerosol Therapy   $ Aerosol Therapy Charges Aerosol Treatment   Daily Review of Necessity (SVN) completed   Respiratory Treatment Status (SVN) given   Treatment Route (SVN) mask;oxygen   Patient Position HOB elevated   Post Treatment Assessment (SVN) breath sounds unchanged   Signs of Intolerance (SVN) none   Education   $ Education Bronchodilator;15 min

## 2024-05-19 NOTE — ASSESSMENT & PLAN NOTE
Patient has a diagnosis of pneumonia. The cause of the pneumonia is suspected to be bacterial in etiology but organism is not known. The pneumonia is stable. The patient has the following signs/symptoms of pneumonia: cough. The patient does not have a current oxygen requirement and the patient does not have a home oxygen requirement. I have reviewed the pertinent imaging. The following cultures have been collected: Blood cultures The culture results are listed below.     Current antimicrobial regimen consists of the antibiotics listed below. Will monitor patient closely and continue current treatment plan unchanged.    Antibiotics (From admission, onward)      Start     Stop Route Frequency Ordered    05/18/24 1500  piperacillin-tazobactam 3.375 g in dextrose 5 % 100 mL IVPB (ready to mix)         -- IV Every 8 hours (non-standard times) 05/18/24 1349            Microbiology Results (last 7 days)       Procedure Component Value Units Date/Time    Blood culture x two cultures. Draw prior to antibiotics. [[id number]] Collected: 05/18/24 0554    Order Status: Completed Specimen: Blood from Peripheral, Hand, Left Updated: 05/19/24 0632     Blood Culture, Routine No Growth to date      No Growth to date    Narrative:      Aerobic and anaerobic  Collection has been rescheduled by [facility] at 05/18/2024 05:42 Reason:   Pt in scan/will wait  Collection has been rescheduled by [facility] at 05/18/2024 05:42 Reason:   Pt in scan/will wait    Blood culture x two cultures. Draw prior to antibiotics. [[id number]] Collected: 05/18/24 0555    Order Status: Completed Specimen: Blood from Peripheral, Hand, Right Updated: 05/19/24 0632     Blood Culture, Routine No Growth to date      No Growth to date    Narrative:      Aerobic and anaerobic  Collection has been rescheduled by [facility] at 05/18/2024 05:42 Reason:   Pt in scan/will wait  Collection has been rescheduled by [facility] at 05/18/2024 05:42 Reason:   Pt in scan/will wait            Suspect  aspiration   Initially started on rocephin/azithro in ED, would hold off on further azithro given Qtc prolongation  -swallow evaluation ordered  -QTC prolongation in proved to 430  -improved cough

## 2024-05-19 NOTE — PROGRESS NOTES
[facility] Medicine  Progress Note    Patient Name: Elpidio Ayala  MRN: [id number]  Patient Class: OP- Observation   Admission Date: 5/18/2024  Length of Stay: 0 days  Attending Physician: Desean Cohen MD  Primary Care Provider: Cali Win Jr., MD        Subjective:     Principal Problem:Sepsis        HPI:  Mr. Ayala is an 89 year old male with a history of BPH and dementia who presents today with complaints of AMS per his brother who went to check on him a 4 am. It is moderate. It is associated with episode of vomiting and nonproductive cough. He denies fever, chills, diarrhea, or LOC. History is limited d/t underlying dementia but is supplemented by brother who is at bedside. He lives alone and is normally oriented to person and place and somewhat situation. This morning he was only oriented to person and was slow to respond prompting his presentation. BP was soft on arrival 99/55 HR 62 satting 92% or better on room air. He was given 1L fluid bolus now with mild hypertension /72. CXR revealed: patchy interstitial opacities, WBC 13.9K, macrocytosis, lactate 2.8, procal 0.5, , highly sensitive troponin WNL, T bili 2.9 (with chronic elevation), UA concentrated but bland, CT head with no acute intracranial abnormalities, abd US Mild right-sided hydronephrosis, Sludge in the gallbladder without findings of acute cholecystitis. Initial EKG: accelerated junctional with Qtc 519. Hospital medicine is consulted for admission for sepsis.     Overview/Hospital Course:  No notes on file    No new subjective & objective note has been filed under this hospital service since the last note was generated.      Assessment/Plan:      * Sepsis  Admit to med/tele  This patient does have evidence of infective focus  My overall impression is sepsis.  Source: Respiratory  Antibiotics given-   Antibiotics (72h ago, onward)      Start     Stop Route Frequency Ordered    05/18/24 1500   piperacillin-tazobactam 3.375 g in dextrose 5 % 100 mL IVPB (ready to mix)         -- IV Every 8 hours (non-standard times) 05/18/24 1349          Latest lactate reviewed-  Recent Labs   Lab 05/19/24  0404   LACTATE 1.5       Organ dysfunction indicated by Acute kidney injury    Fluid challenge: received 1L bolus, MAP never dropped below 65, getting LR at 130mL for now     Post- resuscitation assessment No - Post resuscitation assessment not needed       Will Not start Pressors- Levophed for MAP of 65  Source control achieved by: abx    -continue with Zosyn  -low probability of UTI  -blood cultures pending  -vitals within normal limits.    -Lactic acid 1.5  -source possibly due to aspiration pneumonia.  -we will order swallow evaluation, to rule out aspiration    Cognitive impairment  -diagnosis, per patient's brother  -alert and oriented x2 at baseline  -patient currently needing sitter for safety  -vitamin B12 low, 199.  Replete and monitor      Macrocytosis  Vit B12 low.  Replete and monitor  Thiamine and folic acid wnl  Ethanol levels <10      Symptomatic bradycardia  Pt arrived to floor and HR noted in the 40s, now 63  Repeat EKG Second degree AV block Mobitz I HR 50s  -Cards following.  Pacemaker being considered due to fluctuating heart rate but not at this time  -TSH wnl  -Echo results reviewed  -telemetry monitor  -maintain pacer pads.    -Avoid beta-blockers      BPH (benign prostatic hyperplasia)  Mild right sided hydronephrosis likely d/t this  Urine is bland without signs of infection         Elevated bilirubin  Present over the last few years, but a little worse which may be d/t mild dehydration  Gilbert's?  Abd US with CBD WNL  -Does have macrocytosis - alcohol?  Patient and brother denied history of alcoholism  -Ethanol levels <10  -PT/INR wnl  -Trend CMP  -negative for Strickland sign        Pneumonia  Patient has a diagnosis of pneumonia. The cause of the pneumonia is suspected to be bacterial in  etiology but organism is not known. The pneumonia is stable. The patient has the following signs/symptoms of pneumonia: cough. The patient does not have a current oxygen requirement and the patient does not have a home oxygen requirement. I have reviewed the pertinent imaging. The following cultures have been collected: Blood cultures The culture results are listed below.     Current antimicrobial regimen consists of the antibiotics listed below. Will monitor patient closely and continue current treatment plan unchanged.    Antibiotics (From admission, onward)      Start     Stop Route Frequency Ordered    05/18/24 1500  piperacillin-tazobactam 3.375 g in dextrose 5 % 100 mL IVPB (ready to mix)         -- IV Every 8 hours (non-standard times) 05/18/24 1349            Microbiology Results (last 7 days)       Procedure Component Value Units Date/Time    Blood culture x two cultures. Draw prior to antibiotics. [[id number]] Collected: 05/18/24 0554    Order Status: Completed Specimen: Blood from Peripheral, Hand, Left Updated: 05/19/24 0632     Blood Culture, Routine No Growth to date      No Growth to date    Narrative:      Aerobic and anaerobic  Collection has been rescheduled by [facility] at 05/18/2024 05:42 Reason:   Pt in scan/will wait  Collection has been rescheduled by [facility] at 05/18/2024 05:42 Reason:   Pt in scan/will wait    Blood culture x two cultures. Draw prior to antibiotics. [[id number]] Collected: 05/18/24 0555    Order Status: Completed Specimen: Blood from Peripheral, Hand, Right Updated: 05/19/24 0632     Blood Culture, Routine No Growth to date      No Growth to date    Narrative:      Aerobic and anaerobic  Collection has been rescheduled by [facility] at 05/18/2024 05:42 Reason:   Pt in scan/will wait  Collection has been rescheduled by [facility] at 05/18/2024 05:42 Reason:   Pt in scan/will wait            Suspect aspiration   Initially started on rocephin/azithro in ED, would hold off on further azithro given  Qtc prolongation  -swallow evaluation ordered  -QTC prolongation in proved to 430  -improved cough    Encephalopathy, metabolic  Multifactorial  Infection and bradycardia likely contributing   -bradycardia improved  -patient back to baseline, mental status AAO X2        VTE Risk Mitigation (From admission, onward)           Ordered     enoxaparin injection 40 mg  Daily         05/18/24 1101     IP VTE HIGH RISK PATIENT  Once         05/18/24 1101     Place sequential compression device  Until discontinued         05/18/24 1101                    Discharge Planning   MARINA:      Code Status: Full Code   Is the patient medically ready for discharge?:     Reason for patient still in hospital (select all that apply): Patient trending condition, Laboratory test, Treatment, and Consult recommendations               Critical care time spent on the evaluation and treatment of severe organ dysfunction, review of pertinent labs and imaging studies, discussions with consulting providers and discussions with patient/family: 40 minutes.      Desean Cohen MD  Department of Hospital Medicine   [facility]

## 2024-05-19 NOTE — SUBJECTIVE & OBJECTIVE
Interval History:  Patient was examined in his bed, with his brother by his side.  He was calm and did not appear to be in distress.  His vitals are within normal limits.  He has no complaints at this time.  He denies chest pain, palpitation, shortness of breath, GI/ symptoms.  According to his brother he has a history of cognitive impairment but lives in his house alone.  Patient is currently alert and oriented x2.  He tends to rely on his brother for reassurance when questioned.    Review of Systems   Constitutional:  Negative for activity change, chills and fever.   HENT:  Negative for congestion, rhinorrhea and sore throat.    Eyes:  Negative for discharge and redness.   Respiratory:  Negative for cough, chest tightness, shortness of breath and wheezing.    Cardiovascular:  Negative for chest pain, palpitations and leg swelling.   Gastrointestinal:  Negative for abdominal pain, constipation, diarrhea, nausea and vomiting.   Genitourinary:  Negative for dysuria, flank pain and hematuria.   Musculoskeletal:  Negative for back pain, myalgias and neck pain.   Skin:  Negative for pallor, rash and wound.   Neurological:  Negative for dizziness, tremors, syncope, weakness, numbness and headaches.   Psychiatric/Behavioral:  Negative for agitation, confusion and hallucinations.      Objective:     Vital Signs (Most Recent):  Temp: 98.2 °F (36.8 °C) (05/19/24 0702)  Pulse: (!) 54 (05/19/24 0724)  Resp: 12 (05/19/24 0724)  BP: (!) 157/74 (05/19/24 0600)  SpO2: 97 % (05/19/24 0724) Vital Signs (24h Range):  Temp:  [97.9 °F (36.6 °C)-98.3 °F (36.8 °C)] 98.2 °F (36.8 °C)  Pulse:  [48-70] 54  Resp:  [12-27] 12  SpO2:  [90 %-97 %] 97 %  BP: (101-178)/() 157/74     Weight: 82.3 kg (181 lb 7 oz)  Body mass index is 23.94 kg/m².    Intake/Output Summary (Last 24 hours) at 5/19/2024 0853  Last data filed at 5/19/2024 0600  Gross per 24 hour   Intake 3371.83 ml   Output 1950 ml   Net 1421.83 ml         Physical Exam  Vitals  and nursing note reviewed.   Constitutional:       General: He is not in acute distress.  HENT:      Head: Normocephalic and atraumatic.      Mouth/Throat:      Mouth: Mucous membranes are moist.   Eyes:      General:         Right eye: No discharge.         Left eye: No discharge.      Conjunctiva/sclera: Conjunctivae normal.   Cardiovascular:      Rate and Rhythm: Normal rate and regular rhythm.      Pulses: Normal pulses.   Pulmonary:      Effort: Pulmonary effort is normal.      Breath sounds: Normal breath sounds.   Abdominal:      General: Bowel sounds are normal.      Palpations: Abdomen is soft.   Musculoskeletal:         General: No swelling. Normal range of motion.      Cervical back: Normal range of motion and neck supple.   Skin:     General: Skin is warm and dry.   Neurological:      Mental Status: He is alert. Mental status is at baseline.      Comments: Oriented X2   Psychiatric:         Mood and Affect: Mood normal.             Significant Labs: All pertinent labs within the past 24 hours have been reviewed.    Significant Imaging: I have reviewed all pertinent imaging results/findings within the past 24 hours.

## 2024-05-19 NOTE — NURSING
Pt lying in bed in no acute distress. Sitter present in room and bed alarm on. See ongoing assessment and charted vital signs in epic. See telemetry strip in chart. Pt is disoriented x 4. Spoke to pt.'s brother Celestine concerning pt.'s medications and overall plan of care. Brother states understanding. Frequently reorienting pt and providing reassurance. Pt is very forgetful. Explained medications and overall care plan to pt. Will continue to reiterate plan of care with pt. Call light and urinal within reach. Instruction provided on usage of both. Bed alarm on middle setting. Will continue current plan of care.

## 2024-05-19 NOTE — NURSING
End of shift pt behavior review.    Ongoing behaviors since this morning continue with more and more frequent outbursts.  Occasionally outbursts are even directed at the pt's brother Celestine who is at bedside assisting staff with reorientation most of the day after 9am.  Agitation outbursts are often triggered by the urge to urinate or the urge to get up and walk--despite being very unsteady and requiring 2 person assist.  Irregardless of constant redirection and reorientation the pt has zero awareness of his current illness process or physical limitations.    Dr Cohen notified frequently by this nurse of recurring outbursts and pt safety dangers.      MD brought to bedside at 1700 for eval. during one agitation outburst.  MD places orders for Seroquel.    Charge nurse notified frequently of pt status.  Corky Faith notified at 1730 to hold transfer to [place] A/step-down due to current pt status.

## 2024-05-19 NOTE — NURSING
Pt has severe dementia, worse then usual per pt's brother.  Pt alert and oriented to self only, memory impaired, forgetful, new learning & recall loss, short term memory loss.  Pt is restless, with intermittent agitation.  Pt is extremely  impulsive and quick, and can also be quick to agitation.  Calming environment maintained.  Nurse and staff continues to reorient pt, with frequent speaker phone calls to pt's brother Celestine(pt seems to take instruction easier from brother).      Bed alarm on Zone 2, nurse attempts to stay at bedside for immediate redirection.  Bed Alarm cord (from wall to bed) in room not Functional--Engine[employer] called by nurse for immediate repair.     Safety sitter order in place and active.  Safety sitter not available.    Charge Nurse Melba notified in person by nurse.  House Supervisor Vianney Notified in person by nurse.  Dr. Cohen, MD Desean notified in person by nurse.      (Wall bed plug-repaired by [employer] at 0830)

## 2024-05-19 NOTE — ASSESSMENT & PLAN NOTE
Pt arrived to floor and HR noted in the 40s, now 63  Repeat EKG Second degree AV block Mobitz I HR 50s  -Cards following.  Pacemaker placement was discussed, EP consulted  -TSH wnl  -Echo results reviewed  -telemetry monitor  -maintain pacer pads.    -Avoid beta-blockers

## 2024-05-19 NOTE — NURSING
Pt lying in bed in no acute distress. Assisted pt with ADLs. Sitter present in the room. No complaints. Call light within reach and bed alarm on. Will continue current treatment plan.

## 2024-05-19 NOTE — PLAN OF CARE
Problem: Adult Inpatient Plan of Care  Goal: Plan of Care Review  Outcome: Progressing  Goal: Patient-Specific Goal (Individualized)  Outcome: Progressing  Goal: Absence of Hospital-Acquired Illness or Injury  Outcome: Progressing  Goal: Optimal Comfort and Wellbeing  Outcome: Progressing  Goal: Readiness for Transition of Care  Outcome: Progressing     Problem: Skin Injury Risk Increased  Goal: Skin Health and Integrity  Outcome: Progressing     Problem: Sepsis/Septic Shock  Goal: Optimal Coping  Outcome: Progressing  Goal: Absence of Bleeding  Outcome: Progressing  Goal: Blood Glucose Level Within Targeted Range  Outcome: Progressing  Goal: Absence of Infection Signs and Symptoms  Outcome: Progressing  Goal: Optimal Nutrition Intake  Outcome: Progressing     Problem: Pneumonia  Goal: Fluid Balance  Outcome: Progressing  Goal: Resolution of Infection Signs and Symptoms  Outcome: Progressing  Goal: Effective Oxygenation and Ventilation  Outcome: Progressing     Problem: Fall Injury Risk  Goal: Absence of Fall and Fall-Related Injury  Outcome: Progressing

## 2024-05-19 NOTE — NURSING
Nurses Note -- 4 Eyes      5/18/2024   8:28 PM      Skin assessed during: Daily Assessment      [x] No Altered Skin Integrity Present    []Prevention Measures Documented      [] Yes- Altered Skin Integrity Present or Discovered   [] LDA Added if Not in Epic (Describe Wound)   [] New Altered Skin Integrity was Present on Admit and Documented in LDA   [] Wound Image Taken    Wound Care Consulted? No    Attending Nurse:  Karley Clifton RN/Staff Member:  STORMY

## 2024-05-19 NOTE — ASSESSMENT & PLAN NOTE
Pt arrived to floor and HR noted in the 40s, now 63  Repeat EKG Second degree AV block Mobitz I HR 50s  -Cards following.  Pacemaker being considered due to fluctuating heart rate but not at this time  -TSH wnl  -Echo results reviewed  -telemetry monitor  -maintain pacer pads.    -Avoid beta-blockers

## 2024-05-19 NOTE — ASSESSMENT & PLAN NOTE
-diagnosis, per patient's brother  -alert and oriented x2 at baseline  -patient currently needing sitter for safety  -vitamin B12 low, 199.  Replete and monitor

## 2024-05-20 LAB
ALBUMIN SERPL BCP-MCNC: 3.5 G/DL (ref 3.5–5.2)
ALP SERPL-CCNC: 46 U/L (ref 55–135)
ALT SERPL W/O P-5'-P-CCNC: 8 U/L (ref 10–44)
ANION GAP SERPL CALC-SCNC: 6 MMOL/L (ref 8–16)
AST SERPL-CCNC: 15 U/L (ref 10–40)
BASOPHILS # BLD AUTO: 0.02 K/UL (ref 0–0.2)
BASOPHILS NFR BLD: 0.3 % (ref 0–1.9)
BILIRUB SERPL-MCNC: 1.3 MG/DL (ref 0.1–1)
BUN SERPL-MCNC: 12 MG/DL (ref 8–23)
CALCIUM SERPL-MCNC: 8.8 MG/DL (ref 8.7–10.5)
CHLORIDE SERPL-SCNC: 112 MMOL/L (ref 95–110)
CO2 SERPL-SCNC: 25 MMOL/L (ref 23–29)
CREAT SERPL-MCNC: 0.8 MG/DL (ref 0.5–1.4)
DIFFERENTIAL METHOD BLD: ABNORMAL
EOSINOPHIL # BLD AUTO: 0.2 K/UL (ref 0–0.5)
EOSINOPHIL NFR BLD: 2.5 % (ref 0–8)
ERYTHROCYTE [DISTWIDTH] IN BLOOD BY AUTOMATED COUNT: 14.4 % (ref 11.5–14.5)
EST. GFR  (NO RACE VARIABLE): >60 ML/MIN/1.73 M^2
GLUCOSE SERPL-MCNC: 87 MG/DL (ref 70–110)
HCT VFR BLD AUTO: 46 % (ref 40–54)
HGB BLD-MCNC: 15 G/DL (ref 14–18)
IMM GRANULOCYTES # BLD AUTO: 0.03 K/UL (ref 0–0.04)
IMM GRANULOCYTES NFR BLD AUTO: 0.5 % (ref 0–0.5)
LACTATE SERPL-SCNC: 1.1 MMOL/L (ref 0.5–1.9)
LACTATE SERPL-SCNC: 1.2 MMOL/L (ref 0.5–1.9)
LYMPHOCYTES # BLD AUTO: 1 K/UL (ref 1–4.8)
LYMPHOCYTES NFR BLD: 16.1 % (ref 18–48)
MAGNESIUM SERPL-MCNC: 2.2 MG/DL (ref 1.6–2.6)
MCH RBC QN AUTO: 33.3 PG (ref 27–31)
MCHC RBC AUTO-ENTMCNC: 32.6 G/DL (ref 32–36)
MCV RBC AUTO: 102 FL (ref 82–98)
MONOCYTES # BLD AUTO: 0.5 K/UL (ref 0.3–1)
MONOCYTES NFR BLD: 8.4 % (ref 4–15)
NEUTROPHILS # BLD AUTO: 4.3 K/UL (ref 1.8–7.7)
NEUTROPHILS NFR BLD: 72.2 % (ref 38–73)
NRBC BLD-RTO: 0 /100 WBC
PHOSPHATE SERPL-MCNC: 3.2 MG/DL (ref 2.7–4.5)
PLATELET # BLD AUTO: 158 K/UL (ref 150–450)
PMV BLD AUTO: 10.4 FL (ref 9.2–12.9)
POTASSIUM SERPL-SCNC: 4 MMOL/L (ref 3.5–5.1)
PROT SERPL-MCNC: 5.8 G/DL (ref 6–8.4)
RBC # BLD AUTO: 4.5 M/UL (ref 4.6–6.2)
SODIUM SERPL-SCNC: 143 MMOL/L (ref 136–145)
WBC # BLD AUTO: 5.98 K/UL (ref 3.9–12.7)

## 2024-05-20 PROCEDURE — 94761 N-INVAS EAR/PLS OXIMETRY MLT: CPT

## 2024-05-20 PROCEDURE — 99900031 HC PATIENT EDUCATION (STAT)

## 2024-05-20 PROCEDURE — 36415 COLL VENOUS BLD VENIPUNCTURE: CPT | Performed by: INTERNAL MEDICINE

## 2024-05-20 PROCEDURE — 93010 ELECTROCARDIOGRAM REPORT: CPT | Mod: ,,, | Performed by: GENERAL PRACTICE

## 2024-05-20 PROCEDURE — 84100 ASSAY OF PHOSPHORUS: CPT | Performed by: INTERNAL MEDICINE

## 2024-05-20 PROCEDURE — 63600175 PHARM REV CODE 636 W HCPCS: Performed by: INTERNAL MEDICINE

## 2024-05-20 PROCEDURE — 93005 ELECTROCARDIOGRAM TRACING: CPT | Performed by: GENERAL PRACTICE

## 2024-05-20 PROCEDURE — 25000242 PHARM REV CODE 250 ALT 637 W/ HCPCS: Performed by: NURSE PRACTITIONER

## 2024-05-20 PROCEDURE — 25000003 PHARM REV CODE 250: Performed by: NURSE PRACTITIONER

## 2024-05-20 PROCEDURE — 94640 AIRWAY INHALATION TREATMENT: CPT | Mod: XB

## 2024-05-20 PROCEDURE — 83735 ASSAY OF MAGNESIUM: CPT | Performed by: NURSE PRACTITIONER

## 2024-05-20 PROCEDURE — 36415 COLL VENOUS BLD VENIPUNCTURE: CPT | Performed by: NURSE PRACTITIONER

## 2024-05-20 PROCEDURE — 85025 COMPLETE CBC W/AUTO DIFF WBC: CPT | Performed by: NURSE PRACTITIONER

## 2024-05-20 PROCEDURE — 20000000 HC ICU ROOM

## 2024-05-20 PROCEDURE — 83605 ASSAY OF LACTIC ACID: CPT | Mod: 91 | Performed by: INTERNAL MEDICINE

## 2024-05-20 PROCEDURE — 80053 COMPREHEN METABOLIC PANEL: CPT | Performed by: NURSE PRACTITIONER

## 2024-05-20 PROCEDURE — 63600175 PHARM REV CODE 636 W HCPCS: Performed by: NURSE PRACTITIONER

## 2024-05-20 PROCEDURE — 99233 SBSQ HOSP IP/OBS HIGH 50: CPT | Mod: ,,, | Performed by: INTERNAL MEDICINE

## 2024-05-20 RX ADMIN — SENNOSIDES AND DOCUSATE SODIUM 1 TABLET: 50; 8.6 TABLET ORAL at 09:05

## 2024-05-20 RX ADMIN — DIAZEPAM 2.5 MG: 5 INJECTION, SOLUTION INTRAMUSCULAR; INTRAVENOUS at 01:05

## 2024-05-20 RX ADMIN — ENOXAPARIN SODIUM 40 MG: 40 INJECTION SUBCUTANEOUS at 04:05

## 2024-05-20 RX ADMIN — THIAMINE HCL TAB 100 MG 100 MG: 100 TAB at 09:05

## 2024-05-20 RX ADMIN — PIPERACILLIN SODIUM AND TAZOBACTAM SODIUM 3.38 G: 3; .375 INJECTION, POWDER, LYOPHILIZED, FOR SOLUTION INTRAVENOUS at 11:05

## 2024-05-20 RX ADMIN — IPRATROPIUM BROMIDE AND ALBUTEROL SULFATE 3 ML: 2.5; .5 SOLUTION RESPIRATORY (INHALATION) at 07:05

## 2024-05-20 RX ADMIN — PIPERACILLIN SODIUM AND TAZOBACTAM SODIUM 3.38 G: 3; .375 INJECTION, POWDER, LYOPHILIZED, FOR SOLUTION INTRAVENOUS at 03:05

## 2024-05-20 RX ADMIN — DIAZEPAM 2.5 MG: 5 INJECTION, SOLUTION INTRAMUSCULAR; INTRAVENOUS at 05:05

## 2024-05-20 RX ADMIN — FOLIC ACID 1 MG: 1 TABLET ORAL at 09:05

## 2024-05-20 RX ADMIN — PIPERACILLIN SODIUM AND TAZOBACTAM SODIUM 3.38 G: 3; .375 INJECTION, POWDER, LYOPHILIZED, FOR SOLUTION INTRAVENOUS at 07:05

## 2024-05-20 NOTE — ACP (ADVANCE CARE PLANNING)
Advance Care Planning     Date: 05/20/2024    Today a voluntary meeting took place: ICU    Patient Participation: Patient is unable to participate     Attendees (Name and  Relationship to patient):  Brother Shemar Tuckere    Staff attendees (Name and  Role): Bedside RN    Advance Care Planning     Date: 05/20/2024    Living Will  During this visit, I engaged the patient and brother   in the voluntary advance care planning process.  The patient and I reviewed the role for advance directives and their purpose in directing future healthcare if the patient's unable to speak for him.  At this point in time, the patient does not have full decision-making capacity.  We discussed different extreme health states that he could experience, and reviewed what kind of medical care he would want in those situations.  The patient and brother communicated that if he were comatose and had little chance of a meaningful recovery, he would not want machines/life-sustaining treatments used.  I spent a total of 16 minutes engaging the patient in this advance care planning discussion.               Recommendations/  Follow-up tasks: Follow up family meeting planned:

## 2024-05-20 NOTE — SUBJECTIVE & OBJECTIVE
Interval History:  Patient seen and examined. Afebrile. No subjective complaints - confused, poor short-term memory problem. Brother confirms DNR code status and in agreement with [facility].     Review of Systems   Constitutional:  Negative for activity change, chills and fever.   HENT:  Negative for congestion, rhinorrhea and sore throat.    Eyes:  Negative for discharge and redness.   Respiratory:  Negative for cough, chest tightness, shortness of breath and wheezing.    Cardiovascular:  Negative for chest pain, palpitations and leg swelling.   Gastrointestinal:  Negative for abdominal pain, constipation, diarrhea, nausea and vomiting.   Genitourinary:  Negative for dysuria, flank pain and hematuria.   Musculoskeletal:  Negative for back pain, myalgias and neck pain.   Skin:  Negative for pallor, rash and wound.   Neurological:  Negative for dizziness, tremors, syncope, weakness, numbness and headaches.   Psychiatric/Behavioral:  Negative for agitation, confusion and hallucinations.      Objective:     Vital Signs (Most Recent):  Temp: 98.2 °F (36.8 °C) (05/20/24 0400)  Pulse: 70 (05/20/24 0800)  Resp: (!) 25 (05/20/24 0800)  BP: (!) 159/77 (05/20/24 0800)  SpO2: 95 % (05/20/24 0800) Vital Signs (24h Range):  Temp:  [98.2 °F (36.8 °C)-98.6 °F (37 °C)] 98.2 °F (36.8 °C)  Pulse:  [] 70  Resp:  [17-33] 25  SpO2:  [94 %-98 %] 95 %  BP: (110-191)/(64-97) 159/77     Weight: 82.5 kg (181 lb 14.1 oz)  Body mass index is 24 kg/m².    Intake/Output Summary (Last 24 hours) at 5/20/2024 0901  Last data filed at 5/20/2024 0650  Gross per 24 hour   Intake 1000 ml   Output 3600 ml   Net -2600 ml         Physical Exam  Vitals and nursing note reviewed.   Constitutional:       General: He is not in acute distress.  HENT:      Head: Normocephalic and atraumatic.      Mouth/Throat:      Mouth: Mucous membranes are moist.   Eyes:      General:         Right eye: No discharge.         Left eye: No discharge.       Conjunctiva/sclera: Conjunctivae normal.   Cardiovascular:      Rate and Rhythm: Normal rate and regular rhythm.      Pulses: Normal pulses.   Pulmonary:      Effort: Pulmonary effort is normal.      Breath sounds: Normal breath sounds.   Abdominal:      General: Bowel sounds are normal.      Palpations: Abdomen is soft.   Musculoskeletal:         General: No swelling. Normal range of motion.      Cervical back: Normal range of motion and neck supple.   Skin:     General: Skin is warm and dry.   Neurological:      Mental Status: He is alert. Mental status is at baseline.      Comments: Oriented X2   Psychiatric:         Mood and Affect: Mood normal.             Significant Labs:  Lab Results   Component Value Date    WBC 5.98 05/20/2024    HGB 15.0 05/20/2024    HCT 46.0 05/20/2024     (H) 05/20/2024     05/20/2024       CMP  Sodium   Date Value Ref Range Status   05/20/2024 143 136 - 145 mmol/L Final     Potassium   Date Value Ref Range Status   05/20/2024 4.0 3.5 - 5.1 mmol/L Final     Chloride   Date Value Ref Range Status   05/20/2024 112 (H) 95 - 110 mmol/L Final     CO2   Date Value Ref Range Status   05/20/2024 25 23 - 29 mmol/L Final     Glucose   Date Value Ref Range Status   05/20/2024 87 70 - 110 mg/dL Final     BUN   Date Value Ref Range Status   05/20/2024 12 8 - 23 mg/dL Final     Creatinine   Date Value Ref Range Status   05/20/2024 0.8 0.5 - 1.4 mg/dL Final   07/12/2012 0.7 0.2 - 1.4 mg/dl Final     Calcium   Date Value Ref Range Status   05/20/2024 8.8 8.7 - 10.5 mg/dL Final   07/12/2012 9.7 8.6 - 10.2 mg/dl Final     Total Protein   Date Value Ref Range Status   05/20/2024 5.8 (L) 6.0 - 8.4 g/dL Final     Albumin   Date Value Ref Range Status   05/20/2024 3.5 3.5 - 5.2 g/dL Final     Total Bilirubin   Date Value Ref Range Status   05/20/2024 1.3 (H) 0.1 - 1.0 mg/dL Final     Comment:     For infants and newborns, interpretation of results should be based  on gestational age, weight  and in agreement with clinical  observations.    Premature Infant recommended reference ranges:  Up to 24 hours.............<8.0 mg/dL  Up to 48 hours............<12.0 mg/dL  3-5 days..................<15.0 mg/dL  6-29 days.................<15.0 mg/dL       Alkaline Phosphatase   Date Value Ref Range Status   05/20/2024 46 (L) 55 - 135 U/L Final     AST   Date Value Ref Range Status   05/20/2024 15 10 - 40 U/L Final     ALT   Date Value Ref Range Status   05/20/2024 8 (L) 10 - 44 U/L Final     Anion Gap   Date Value Ref Range Status   05/20/2024 6 (L) 8 - 16 mmol/L Final   07/12/2012 7 5 - 15 meq/L Final     eGFR   Date Value Ref Range Status   05/20/2024 >60.0 >60 mL/min/1.73 m^2 Final     Microbiology Results (last 7 days)       Procedure Component Value Units Date/Time    Blood culture x two cultures. Draw prior to antibiotics. [[id number]] Collected: 05/18/24 0554    Order Status: Completed Specimen: Blood from Peripheral, Hand, Left Updated: 05/20/24 0632     Blood Culture, Routine No Growth to date      No Growth to date      No Growth to date    Narrative:      Aerobic and anaerobic  Collection has been rescheduled by [facility] at 05/18/2024 05:42 Reason:   Pt in scan/will wait  Collection has been rescheduled by B1 at 05/18/2024 05:42 Reason:   Pt in scan/will wait    Blood culture x two cultures. Draw prior to antibiotics. [[id number]] Collected: 05/18/24 0555    Order Status: Completed Specimen: Blood from Peripheral, Hand, Right Updated: 05/20/24 0632     Blood Culture, Routine No Growth to date      No Growth to date      No Growth to date    Narrative:      Aerobic and anaerobic  Collection has been rescheduled by B1 at 05/18/2024 05:42 Reason:   Pt in scan/will wait  Collection has been rescheduled by B1 at 05/18/2024 05:42 Reason:   Pt in scan/will wait          Significant Imaging:  ECHO:    Left Ventricle: The left ventricle is normal in size. Normal wall thickness. Unable to assess wall motion.  There is low normal systolic function with a visually estimated ejection fraction of 50 - 55%.    Right Ventricle: Right ventricle was not well visualized due to poor acoustic window. Systolic function is normal.    Aortic Valve: The aortic valve is a trileaflet valve. There is moderate aortic regurgitation.    Mitral Valve: There is no stenosis. The mean pressure gradient across the mitral valve is 3 mmHg at a heart rate of  bpm. There is mild regurgitation.    Tricuspid Valve: There is mild regurgitation.    CXR:  Patchy interstitial predominant opacities bilaterally could relate to edema versus infectious or non infectious inflammatory process.     CT Head without contrast:  No definite acute intracranial findings by noncontrast CT.     RUQ abdominal US:  1.  Mild right-sided hydronephrosis.  2.  Sludge in the gallbladder without findings of acute cholecystitis.  3.  No intra/extrahepatic biliary ductal dilation.

## 2024-05-20 NOTE — PLAN OF CARE
Problem: Adult Inpatient Plan of Care  Goal: Plan of Care Review  Outcome: Progressing  Goal: Patient-Specific Goal (Individualized)  Outcome: Progressing  Goal: Absence of Hospital-Acquired Illness or Injury  Outcome: Progressing  Goal: Optimal Comfort and Wellbeing  Outcome: Progressing  Goal: Readiness for Transition of Care  Outcome: Progressing     Problem: Skin Injury Risk Increased  Goal: Skin Health and Integrity  Outcome: Progressing     Problem: Sepsis/Septic Shock  Goal: Optimal Coping  Outcome: Progressing  Goal: Absence of Bleeding  Outcome: Progressing  Goal: Blood Glucose Level Within Targeted Range  Outcome: Progressing  Goal: Absence of Infection Signs and Symptoms  Outcome: Progressing  Goal: Optimal Nutrition Intake  Outcome: Progressing     Problem: Pneumonia  Goal: Fluid Balance  Outcome: Progressing  Goal: Resolution of Infection Signs and Symptoms  Outcome: Progressing  Goal: Effective Oxygenation and Ventilation  Outcome: Progressing     Problem: Fall Injury Risk  Goal: Absence of Fall and Fall-Related Injury  Outcome: Progressing     Problem: Restraint, Nonviolent  Goal: Absence of Harm or Injury  Outcome: Progressing      follow up with MD

## 2024-05-20 NOTE — NURSING
Pt is agitated, restless and intermittently combative. Sitter present at bedside. Pt is insisting that he ambulate to the bathroom when he has to urinate regardless of  him being unsteady on his feet. Pt is a two person assist because he is unsteady. He is not redirectable. Pt is confused to person, place and time. He does recognize his brother Celestine. Pt.'s personality is liable and he is extremely forgetful. He has short term memory loss with a formal diagnosis of dementia. See md notes and history in admit physical in epic. Continuing to orient pt to person, place and time and give reassurance. Charge nurse KAREN Fonseca and hospital security aware of pt status. See ongoing assessment and charted vital signs in epic. See telemetry strip in chart. Sitter within 5 feet of pt at bedside at all times. Call light within reach and bed alarm on. Will continue current treatment plan.

## 2024-05-20 NOTE — PT/OT/SLP PROGRESS
Speech Language Pathology      Elpidio Ayala  MRN: [id number]    Patient not seen today secondary to  (therapist unavailable). Will follow-up 5/20/24.

## 2024-05-20 NOTE — SUBJECTIVE & OBJECTIVE
Interval History:  Patient seen and examined.  No new complaints.  No further bradycardia, lightheadedness, near syncope symptoms noted.  Brother confirms DNR code status and planning to take patient to assisted living facility.    Review of Systems   Constitutional:  Negative for activity change, chills and fever.   HENT:  Negative for congestion, rhinorrhea and sore throat.    Eyes:  Negative for discharge and redness.   Respiratory:  Negative for cough, chest tightness, shortness of breath and wheezing.    Cardiovascular:  Negative for chest pain, palpitations and leg swelling.   Gastrointestinal:  Negative for abdominal pain, constipation, diarrhea, nausea and vomiting.   Genitourinary:  Negative for dysuria, flank pain and hematuria.   Musculoskeletal:  Negative for back pain, myalgias and neck pain.   Skin:  Negative for pallor, rash and wound.   Neurological:  Negative for dizziness, tremors, syncope, weakness, numbness and headaches.   Psychiatric/Behavioral:  Negative for agitation, confusion and hallucinations.      Objective:     Vital Signs (Most Recent):  Temp: 97.7 °F (36.5 °C) (05/21/24 0701)  Pulse: (!) 49 (05/21/24 0701)  Resp: (!) 21 (05/21/24 0701)  BP: (!) 176/81 (05/21/24 0701)  SpO2: 97 % (05/21/24 0701) Vital Signs (24h Range):  Temp:  [97.7 °F (36.5 °C)-98.9 °F (37.2 °C)] 97.7 °F (36.5 °C)  Pulse:  [49-94] 49  Resp:  [15-34] 21  SpO2:  [92 %-98 %] 97 %  BP: (110-176)/(56-84) 176/81     Weight: 82.5 kg (181 lb 14.1 oz)  Body mass index is 24 kg/m².    Intake/Output Summary (Last 24 hours) at 5/21/2024 0827  Last data filed at 5/21/2024 0329  Gross per 24 hour   Intake 200 ml   Output 875 ml   Net -675 ml         Physical Exam  Vitals and nursing note reviewed.   Constitutional:       General: He is not in acute distress.  HENT:      Head: Normocephalic and atraumatic.      Mouth/Throat:      Mouth: Mucous membranes are moist.   Eyes:      General:         Right eye: No discharge.         Left  eye: No discharge.      Conjunctiva/sclera: Conjunctivae normal.   Cardiovascular:      Rate and Rhythm: Normal rate and regular rhythm.      Pulses: Normal pulses.   Pulmonary:      Effort: Pulmonary effort is normal.      Breath sounds: Normal breath sounds.   Abdominal:      General: Bowel sounds are normal.      Palpations: Abdomen is soft.   Musculoskeletal:         General: No swelling. Normal range of motion.      Cervical back: Normal range of motion and neck supple.   Skin:     General: Skin is warm and dry.   Neurological:      Mental Status: He is alert. Mental status is at baseline.      Comments: Oriented X2   Psychiatric:         Mood and Affect: Mood normal.             Significant Labs:  Lab Results   Component Value Date    WBC 6.01 05/21/2024    HGB 14.5 05/21/2024    HCT 43.8 05/21/2024     (H) 05/21/2024     05/21/2024       CMP  Sodium   Date Value Ref Range Status   05/21/2024 142 136 - 145 mmol/L Final     Potassium   Date Value Ref Range Status   05/21/2024 4.4 3.5 - 5.1 mmol/L Final     Chloride   Date Value Ref Range Status   05/21/2024 109 95 - 110 mmol/L Final     CO2   Date Value Ref Range Status   05/21/2024 26 23 - 29 mmol/L Final     Glucose   Date Value Ref Range Status   05/21/2024 81 70 - 110 mg/dL Final     BUN   Date Value Ref Range Status   05/21/2024 13 8 - 23 mg/dL Final     Creatinine   Date Value Ref Range Status   05/21/2024 0.9 0.5 - 1.4 mg/dL Final   07/12/2012 0.7 0.2 - 1.4 mg/dl Final     Calcium   Date Value Ref Range Status   05/21/2024 8.6 (L) 8.7 - 10.5 mg/dL Final   07/12/2012 9.7 8.6 - 10.2 mg/dl Final     Total Protein   Date Value Ref Range Status   05/21/2024 5.5 (L) 6.0 - 8.4 g/dL Final     Albumin   Date Value Ref Range Status   05/21/2024 3.3 (L) 3.5 - 5.2 g/dL Final     Total Bilirubin   Date Value Ref Range Status   05/21/2024 1.1 (H) 0.1 - 1.0 mg/dL Final     Comment:     For infants and newborns, interpretation of results should be  based  on gestational age, weight and in agreement with clinical  observations.    Premature Infant recommended reference ranges:  Up to 24 hours.............<8.0 mg/dL  Up to 48 hours............<12.0 mg/dL  3-5 days..................<15.0 mg/dL  6-29 days.................<15.0 mg/dL       Alkaline Phosphatase   Date Value Ref Range Status   05/21/2024 44 (L) 55 - 135 U/L Final     AST   Date Value Ref Range Status   05/21/2024 13 10 - 40 U/L Final     ALT   Date Value Ref Range Status   05/21/2024 9 (L) 10 - 44 U/L Final     Anion Gap   Date Value Ref Range Status   05/21/2024 7 (L) 8 - 16 mmol/L Final   07/12/2012 7 5 - 15 meq/L Final     eGFR   Date Value Ref Range Status   05/21/2024 >60.0 >60 mL/min/1.73 m^2 Final     Microbiology Results (last 7 days)       Procedure Component Value Units Date/Time    Blood culture x two cultures. Draw prior to antibiotics. [[id number]] Collected: 05/18/24 0555    Order Status: Completed Specimen: Blood from Peripheral, Hand, Right Updated: 05/21/24 0632     Blood Culture, Routine No Growth to date      No Growth to date      No Growth to date      No Growth to date    Narrative:      Aerobic and anaerobic  Collection has been rescheduled by [facility] at 05/18/2024 05:42 Reason:   Pt in scan/will wait  Collection has been rescheduled by [facility] at 05/18/2024 05:42 Reason:   Pt in scan/will wait    Blood culture x two cultures. Draw prior to antibiotics. [[id number]] Collected: 05/18/24 0554    Order Status: Completed Specimen: Blood from Peripheral, Hand, Left Updated: 05/21/24 0632     Blood Culture, Routine No Growth to date      No Growth to date      No Growth to date      No Growth to date    Narrative:      Aerobic and anaerobic  Collection has been rescheduled by [facility] at 05/18/2024 05:42 Reason:   Pt in scan/will wait  Collection has been rescheduled by [facility] at 05/18/2024 05:42 Reason:   Pt in scan/will wait          Significant Imaging:  ECHO:    Left Ventricle: The left  ventricle is normal in size. Normal wall thickness. Unable to assess wall motion. There is low normal systolic function with a visually estimated ejection fraction of 50 - 55%.    Right Ventricle: Right ventricle was not well visualized due to poor acoustic window. Systolic function is normal.    Aortic Valve: The aortic valve is a trileaflet valve. There is moderate aortic regurgitation.    Mitral Valve: There is no stenosis. The mean pressure gradient across the mitral valve is 3 mmHg at a heart rate of  bpm. There is mild regurgitation.    Tricuspid Valve: There is mild regurgitation.    CXR:  Patchy interstitial predominant opacities bilaterally could relate to edema versus infectious or non infectious inflammatory process.     CT Head without contrast:  No definite acute intracranial findings by noncontrast CT.     RUQ abdominal US:  1.  Mild right-sided hydronephrosis.  2.  Sludge in the gallbladder without findings of acute cholecystitis.  3.  No intra/extrahepatic biliary ductal dilation.

## 2024-05-20 NOTE — NURSING
Transferring care of pt to KAREN López. Gave report to nurse. Nurse states understanding. Care transfer at this time.

## 2024-05-20 NOTE — NURSING
Nurses Note -- 4 Eyes      5/19/2024   9:26 PM      Skin assessed during: Q Shift Change      [x] No Altered Skin Integrity Present    []Prevention Measures Documented      [] Yes- Altered Skin Integrity Present or Discovered   [] LDA Added if Not in Epic (Describe Wound)   [] New Altered Skin Integrity was Present on Admit and Documented in LDA   [] Wound Image Taken    Wound Care Consulted? No    Attending Nurse:  Karley Clifton RN/Staff Member:  KUN

## 2024-05-20 NOTE — CARE UPDATE
Discontinue tx   05/20/24 0710   Patient Assessment/Suction   Level of Consciousness (AVPU) alert   Respiratory Effort Normal;Unlabored   Expansion/Accessory Muscles/Retractions no use of accessory muscles;expansion symmetric   All Lung Fields Breath Sounds equal bilaterally;clear   Rhythm/Pattern, Respiratory unlabored;depth regular;no shortness of breath reported   PRE-TX-O2   Device (Oxygen Therapy) room air   SpO2 97 %   Pulse Oximetry Type Continuous   $ Pulse Oximetry - Multiple Charge Pulse Oximetry - Multiple   Pulse (!) 58   Resp 20   Aerosol Therapy   $ Aerosol Therapy Charges Aerosol Treatment   Daily Review of Necessity (SVN) completed   Respiratory Treatment Status (SVN) given   Treatment Route (SVN) mask;oxygen   Patient Position HOB elevated   Post Treatment Assessment (SVN) breath sounds unchanged   Signs of Intolerance (SVN) none   Breath Sounds Post-Respiratory Treatment   Throughout All Fields Post-Treatment All Fields   Throughout All Fields Post-Treatment no change   Post-treatment Heart Rate (beats/min) 58   Post-treatment Resp Rate (breaths/min) 18   Education   $ Education Bronchodilator;15 min

## 2024-05-20 NOTE — NURSING
Pt awake and alert. Sitter present in room. Pt is cooperative and calm. Pt is not resisting care at this time. Remains confused to person, time and place. Repositioned pt. Removed bilateral ankle restraints. Pt is not trying to get out of bed at this time. Resting with no complaints. Still attempting to reach and remove essential medical equipment. Bilateral wrist restraints remain in place. Call light within reach and bed alarm on middle setting. Will continue current plan of care.

## 2024-05-20 NOTE — NURSING
Pt resting quietly and in no acute distress. Chest rises and falls with respirations. Respirations even and labored. See charted vital signs in epic. See current labs and scheduled am 12-lead EKG in epic. Sitter present in room. Repositioned pt. Call light within reach and bed alarm on. Will continue current plan of care.

## 2024-05-20 NOTE — RESPIRATORY THERAPY
05/19/24 2141   Patient Assessment/Suction   Level of Consciousness (AVPU) alert   Respiratory Effort Normal;Unlabored   Expansion/Accessory Muscles/Retractions no retractions;no use of accessory muscles   All Lung Fields Breath Sounds diminished   Rhythm/Pattern, Respiratory pattern regular;unlabored   Cough Frequency infrequent   Cough Type nonproductive   PRE-TX-O2   Device (Oxygen Therapy) room air   SpO2 96 %   Pulse Oximetry Type Continuous   $ Pulse Oximetry - Multiple Charge Pulse Oximetry - Multiple   Pulse (!) 54   Resp (!) 22   Aerosol Therapy   $ Aerosol Therapy Charges Aerosol Treatment   Daily Review of Necessity (SVN) completed   Respiratory Treatment Status (SVN) given   Treatment Route (SVN) mask;oxygen   Patient Position semi-Min's   Post Treatment Assessment (SVN) breath sounds unchanged   Signs of Intolerance (SVN) none   Education   $ Education Bronchodilator;15 min

## 2024-05-20 NOTE — NURSING
Pt is attempting to remove essential medical equipment and is very agitated and restless. Pt is attempting to stand and walk. He is very unsteady and weak and is a high fall risk. Pt is not redirectable. Notified Dr. Pascual of pt status. See new orders for medications in MAR and restraints. Sitter and hospital nursing staff present at bedside. Repositioned pt and placed pt in restraints per md order. See md order in epic. Will give ordered mediation per md order. Sitter present the room. Call light within reach and bed alarm on. Will continue current plan of care.

## 2024-05-20 NOTE — NURSING
Pt became combative when administrating medication. See MAR for medications per md order. Pt.'s personality is very liable. Pt attempting to release himself from his wrist restraints and to remove essential medical equipment. Attempted to explain plan of care and redirect pt often. Continuing to give reassurance. Pt is very forgetful. Offered pt juice and a snack. Pt declined. Call light within reach and bed alarm on. Sitter present in room. Will continue current plan of care.

## 2024-05-20 NOTE — PLAN OF CARE
05/20/24 1310   Discharge Reassessment   Assessment Type Discharge Planning Reassessment   Did the patient's condition or plan change since previous assessment? No   Discharge Plan discussed with: Sibling   Name(s) and Number(s) Celestine   Discharge Plan A New Nursing Home placement - long term care facility   Discharge Plan B Assisted Living   DME Needed Upon Discharge  none   Transition of Care Barriers None   Why the patient remains in the hospital Requires continued medical care   Post-Acute Status   Post-Acute Authorization Placement   Post-Acute Placement Status Referrals Sent   Discharge Delays None known at this time     Spoke to brother Celestine at bedside who thinks it may be time to place pt.  CM spoke to   Celestine about choices of NH vs assisted living facility and he was interested in an informational visit with Alison Amin to contact Celestine and evaluate pt, cm to follow for completion.

## 2024-05-20 NOTE — PROGRESS NOTES
[facility] Medicine  Progress Note    Patient Name: Elpidio Ayala  MRN: [id number]  Patient Class: IP- Inpatient   Admission Date: 5/18/2024  Length of Stay: 0 days  Attending Physician: Sg Cowan MD  Primary Care Provider: Cali Win Jr., MD        Subjective:     Principal Problem:Sepsis        HPI:  Mr. Ayala is an 89 year old male with a history of BPH and dementia who presents today with complaints of AMS per his brother who went to check on him a 4 am. It is moderate. It is associated with episode of vomiting and nonproductive cough. He denies fever, chills, diarrhea, or LOC. History is limited d/t underlying dementia but is supplemented by brother who is at bedside. He lives alone and is normally oriented to person and place and somewhat situation. This morning he was only oriented to person and was slow to respond prompting his presentation. BP was soft on arrival 99/55 HR 62 satting 92% or better on room air. He was given 1L fluid bolus now with mild hypertension /72. CXR revealed: patchy interstitial opacities, WBC 13.9K, macrocytosis, lactate 2.8, procal 0.5, , highly sensitive troponin WNL, T bili 2.9 (with chronic elevation), UA concentrated but bland, CT head with no acute intracranial abnormalities, abd US Mild right-sided hydronephrosis, Sludge in the gallbladder without findings of acute cholecystitis. Initial EKG: accelerated junctional with Qtc 519. Hospital medicine is consulted for admission for sepsis.     Overview/Hospital Course:  No notes on file    Interval History:  Patient seen and examined. Afebrile. No subjective complaints - confused, poor short-term memory problem. Brother confirms DNR code status and in agreement with assisted NH placement.     Review of Systems   Constitutional:  Negative for activity change, chills and fever.   HENT:  Negative for congestion, rhinorrhea and sore throat.    Eyes:  Negative for discharge and  redness.   Respiratory:  Negative for cough, chest tightness, shortness of breath and wheezing.    Cardiovascular:  Negative for chest pain, palpitations and leg swelling.   Gastrointestinal:  Negative for abdominal pain, constipation, diarrhea, nausea and vomiting.   Genitourinary:  Negative for dysuria, flank pain and hematuria.   Musculoskeletal:  Negative for back pain, myalgias and neck pain.   Skin:  Negative for pallor, rash and wound.   Neurological:  Negative for dizziness, tremors, syncope, weakness, numbness and headaches.   Psychiatric/Behavioral:  Negative for agitation, confusion and hallucinations.      Objective:     Vital Signs (Most Recent):  Temp: 98.2 °F (36.8 °C) (05/20/24 0400)  Pulse: 70 (05/20/24 0800)  Resp: (!) 25 (05/20/24 0800)  BP: (!) 159/77 (05/20/24 0800)  SpO2: 95 % (05/20/24 0800) Vital Signs (24h Range):  Temp:  [98.2 °F (36.8 °C)-98.6 °F (37 °C)] 98.2 °F (36.8 °C)  Pulse:  [] 70  Resp:  [17-33] 25  SpO2:  [94 %-98 %] 95 %  BP: (110-191)/(64-97) 159/77     Weight: 82.5 kg (181 lb 14.1 oz)  Body mass index is 24 kg/m².    Intake/Output Summary (Last 24 hours) at 5/20/2024 0901  Last data filed at 5/20/2024 0650  Gross per 24 hour   Intake 1000 ml   Output 3600 ml   Net -2600 ml         Physical Exam  Vitals and nursing note reviewed.   Constitutional:       General: He is not in acute distress.  HENT:      Head: Normocephalic and atraumatic.      Mouth/Throat:      Mouth: Mucous membranes are moist.   Eyes:      General:         Right eye: No discharge.         Left eye: No discharge.      Conjunctiva/sclera: Conjunctivae normal.   Cardiovascular:      Rate and Rhythm: Normal rate and regular rhythm.      Pulses: Normal pulses.   Pulmonary:      Effort: Pulmonary effort is normal.      Breath sounds: Normal breath sounds.   Abdominal:      General: Bowel sounds are normal.      Palpations: Abdomen is soft.   Musculoskeletal:         General: No swelling. Normal range of  motion.      Cervical back: Normal range of motion and neck supple.   Skin:     General: Skin is warm and dry.   Neurological:      Mental Status: He is alert. Mental status is at baseline.      Comments: Oriented X2   Psychiatric:         Mood and Affect: Mood normal.             Significant Labs:  Lab Results   Component Value Date    WBC 5.98 05/20/2024    HGB 15.0 05/20/2024    HCT 46.0 05/20/2024     (H) 05/20/2024     05/20/2024       CMP  Sodium   Date Value Ref Range Status   05/20/2024 143 136 - 145 mmol/L Final     Potassium   Date Value Ref Range Status   05/20/2024 4.0 3.5 - 5.1 mmol/L Final     Chloride   Date Value Ref Range Status   05/20/2024 112 (H) 95 - 110 mmol/L Final     CO2   Date Value Ref Range Status   05/20/2024 25 23 - 29 mmol/L Final     Glucose   Date Value Ref Range Status   05/20/2024 87 70 - 110 mg/dL Final     BUN   Date Value Ref Range Status   05/20/2024 12 8 - 23 mg/dL Final     Creatinine   Date Value Ref Range Status   05/20/2024 0.8 0.5 - 1.4 mg/dL Final   07/12/2012 0.7 0.2 - 1.4 mg/dl Final     Calcium   Date Value Ref Range Status   05/20/2024 8.8 8.7 - 10.5 mg/dL Final   07/12/2012 9.7 8.6 - 10.2 mg/dl Final     Total Protein   Date Value Ref Range Status   05/20/2024 5.8 (L) 6.0 - 8.4 g/dL Final     Albumin   Date Value Ref Range Status   05/20/2024 3.5 3.5 - 5.2 g/dL Final     Total Bilirubin   Date Value Ref Range Status   05/20/2024 1.3 (H) 0.1 - 1.0 mg/dL Final     Comment:     For infants and newborns, interpretation of results should be based  on gestational age, weight and in agreement with clinical  observations.    Premature Infant recommended reference ranges:  Up to 24 hours.............<8.0 mg/dL  Up to 48 hours............<12.0 mg/dL  3-5 days..................<15.0 mg/dL  6-29 days.................<15.0 mg/dL       Alkaline Phosphatase   Date Value Ref Range Status   05/20/2024 46 (L) 55 - 135 U/L Final     AST   Date Value Ref Range Status    05/20/2024 15 10 - 40 U/L Final     ALT   Date Value Ref Range Status   05/20/2024 8 (L) 10 - 44 U/L Final     Anion Gap   Date Value Ref Range Status   05/20/2024 6 (L) 8 - 16 mmol/L Final   07/12/2012 7 5 - 15 meq/L Final     eGFR   Date Value Ref Range Status   05/20/2024 >60.0 >60 mL/min/1.73 m^2 Final     Microbiology Results (last 7 days)       Procedure Component Value Units Date/Time    Blood culture x two cultures. Draw prior to antibiotics. [[id number]] Collected: 05/18/24 0554    Order Status: Completed Specimen: Blood from Peripheral, Hand, Left Updated: 05/20/24 0632     Blood Culture, Routine No Growth to date      No Growth to date      No Growth to date    Narrative:      Aerobic and anaerobic  Collection has been rescheduled by [facility] at 05/18/2024 05:42 Reason:   Pt in scan/will wait  Collection has been rescheduled by [facility] at 05/18/2024 05:42 Reason:   Pt in scan/will wait    Blood culture x two cultures. Draw prior to antibiotics. [[id number]] Collected: 05/18/24 0555    Order Status: Completed Specimen: Blood from Peripheral, Hand, Right Updated: 05/20/24 0632     Blood Culture, Routine No Growth to date      No Growth to date      No Growth to date    Narrative:      Aerobic and anaerobic  Collection has been rescheduled by [facility] at 05/18/2024 05:42 Reason:   Pt in scan/will wait  Collection has been rescheduled by [facility] at 05/18/2024 05:42 Reason:   Pt in scan/will wait          Significant Imaging:  ECHO:    Left Ventricle: The left ventricle is normal in size. Normal wall thickness. Unable to assess wall motion. There is low normal systolic function with a visually estimated ejection fraction of 50 - 55%.    Right Ventricle: Right ventricle was not well visualized due to poor acoustic window. Systolic function is normal.    Aortic Valve: The aortic valve is a trileaflet valve. There is moderate aortic regurgitation.    Mitral Valve: There is no stenosis. The mean pressure gradient across the  mitral valve is 3 mmHg at a heart rate of  bpm. There is mild regurgitation.    Tricuspid Valve: There is mild regurgitation.    CXR:  Patchy interstitial predominant opacities bilaterally could relate to edema versus infectious or non infectious inflammatory process.     CT Head without contrast:  No definite acute intracranial findings by noncontrast CT.     RUQ abdominal US:  1.  Mild right-sided hydronephrosis.  2.  Sludge in the gallbladder without findings of acute cholecystitis.  3.  No intra/extrahepatic biliary ductal dilation.    Assessment/Plan:      * Sepsis  Continue telemonitoring.   This patient does have evidence of infective focus  My overall impression is sepsis.  Source: Respiratory  Antibiotics given-   Antibiotics (72h ago, onward)      Start     Stop Route Frequency Ordered    05/18/24 1500  piperacillin-tazobactam 3.375 g in dextrose 5 % 100 mL IVPB (ready to mix)         -- IV Every 8 hours (non-standard times) 05/18/24 1349          Latest lactate reviewed-  Recent Labs   Lab 05/20/24  0911   LACTATE 1.2       Organ dysfunction indicated by Acute kidney injury    Fluid challenge: received 1L bolus, MAP never dropped below 65, getting LR at 130mL for now     Post- resuscitation assessment No - Post resuscitation assessment not needed       Will Not start Pressors- Levophed for MAP of 65  Source control achieved by: abx    -continue with Zosyn  -low probability of UTI  -blood cultures pending  -vitals within normal limits.    -Lactic acid 1.5  -source possibly due to aspiration pneumonia.  -we will order swallow evaluation, to rule out aspiration    Cognitive impairment  -diagnosis, per patient's brother  -alert and oriented x2 at baseline  -patient currently needing sitter for safety  -vitamin B12 low, 199.  Replete and monitor      Macrocytosis  Vitamin B12 deficiency - start supplementation. Thiamine and folic acid wnl  Ethanol levels <10      Symptomatic bradycardia  Pt arrived to floor  and HR noted in the 40s, now 63  Repeat EKG Second degree AV block Mobitz I HR 50s  -Cards following.  Pacemaker being considered due to fluctuating heart rate but not at this time  -TSH wnl  -Echo results reviewed  -telemetry monitor  -maintain pacer pads.    -Avoid beta-blockers      BPH (benign prostatic hyperplasia)  Mild right sided hydronephrosis likely d/t this  Urine is bland without signs of infection         Elevated bilirubin  Present over the last few years, but a little worse which may be d/t mild dehydration  Gilbert's?  Abd US with CBD WNL  -Does have macrocytosis - alcohol?  Patient and brother denied history of alcoholism  -Ethanol levels <10  -PT/INR wnl  -Trend CMP  -negative for Strickland sign        Pneumonia  Patient has a diagnosis of pneumonia. The cause of the pneumonia is suspected to be bacterial in etiology but organism is not known. The pneumonia is stable. The patient has the following signs/symptoms of pneumonia: cough. The patient does not have a current oxygen requirement and the patient does not have a home oxygen requirement. I have reviewed the pertinent imaging. The following cultures have been collected: Blood cultures The culture results are listed below.     Current antimicrobial regimen consists of the antibiotics listed below. Will monitor patient closely and continue current treatment plan unchanged.    Antibiotics (From admission, onward)      Start     Stop Route Frequency Ordered    05/18/24 1500  piperacillin-tazobactam 3.375 g in dextrose 5 % 100 mL IVPB (ready to mix)         -- IV Every 8 hours (non-standard times) 05/18/24 1349            Microbiology Results (last 7 days)       Procedure Component Value Units Date/Time    Blood culture x two cultures. Draw prior to antibiotics. [[id number]] Collected: 05/18/24 0554    Order Status: Completed Specimen: Blood from Peripheral, Hand, Left Updated: 05/20/24 0632     Blood Culture, Routine No Growth to date      No Growth to  date      No Growth to date    Narrative:      Aerobic and anaerobic  Collection has been rescheduled by [facility] at 05/18/2024 05:42 Reason:   Pt in scan/will wait  Collection has been rescheduled by [facility] at 05/18/2024 05:42 Reason:   Pt in scan/will wait    Blood culture x two cultures. Draw prior to antibiotics. [[id number]] Collected: 05/18/24 0555    Order Status: Completed Specimen: Blood from Peripheral, Hand, Right Updated: 05/20/24 0632     Blood Culture, Routine No Growth to date      No Growth to date      No Growth to date    Narrative:      Aerobic and anaerobic  Collection has been rescheduled by [facility] at 05/18/2024 05:42 Reason:   Pt in scan/will wait  Collection has been rescheduled by [facility] at 05/18/2024 05:42 Reason:   Pt in scan/will wait            Suspect aspiration   Initially started on rocephin/azithro in ED, would hold off on further azithro given Qtc prolongation  -swallow evaluation ordered  -QTC prolongation in proved to 430  -improved cough    Encephalopathy, metabolic  Multifactorial  Infection and bradycardia likely contributing   -bradycardia improved  -patient back to baseline, mental status AAO X2      Continue PT and OT. Discussed with patient's brother and CM regarding DC planning.   VTE Risk Mitigation (From admission, onward)           Ordered     enoxaparin injection 40 mg  Daily         05/18/24 1101     IP VTE HIGH RISK PATIENT  Once         05/18/24 1101     Place sequential compression device  Until discontinued         05/18/24 1101                    Discharge Planning   MARINA: 5/23/2024     Code Status: DNR   Is the patient medically ready for discharge?:     Reason for patient still in hospital (select all that apply): Patient trending condition and Consult recommendations  Discharge Plan A: New Nursing Home placement - care home care facility   Discharge Delays: None known at this time        Critical care time spent on the evaluation and treatment of severe organ dysfunction,  review of pertinent labs and imaging studies, discussions with consulting providers and discussions with patient/family: 35 minutes.      Sg Cowan MD  Department of Hospital Medicine   [facility]

## 2024-05-20 NOTE — NURSING
Assisted pt with urinating. Pt is calm and accepting care. Sitter present in room. Pt declined anything to drink or eat. Pt now resting quietly. Chest rises and falls with respirations. Breathing is regular and unlabored. Repositioned pt. Call light within reach and bed alarm on middle setting. Will continue current treatment plan.

## 2024-05-20 NOTE — ASSESSMENT & PLAN NOTE
Patient has a diagnosis of pneumonia. The cause of the pneumonia is suspected to be bacterial in etiology but organism is not known. The pneumonia is stable. The patient has the following signs/symptoms of pneumonia: cough. The patient does not have a current oxygen requirement and the patient does not have a home oxygen requirement. I have reviewed the pertinent imaging. The following cultures have been collected: Blood cultures The culture results are listed below.     Current antimicrobial regimen consists of the antibiotics listed below. Will monitor patient closely and continue current treatment plan unchanged.    Antibiotics (From admission, onward)    Start     Stop Route Frequency Ordered    05/18/24 1500  piperacillin-tazobactam 3.375 g in dextrose 5 % 100 mL IVPB (ready to mix)         -- IV Every 8 hours (non-standard times) 05/18/24 1349          Microbiology Results (last 7 days)     Procedure Component Value Units Date/Time    Blood culture x two cultures. Draw prior to antibiotics. [[id number]] Collected: 05/18/24 0554    Order Status: Completed Specimen: Blood from Peripheral, Hand, Left Updated: 05/20/24 0632     Blood Culture, Routine No Growth to date      No Growth to date      No Growth to date    Narrative:      Aerobic and anaerobic  Collection has been rescheduled by [facility] at 05/18/2024 05:42 Reason:   Pt in scan/will wait  Collection has been rescheduled by [facility] at 05/18/2024 05:42 Reason:   Pt in scan/will wait    Blood culture x two cultures. Draw prior to antibiotics. [[id number]] Collected: 05/18/24 0555    Order Status: Completed Specimen: Blood from Peripheral, Hand, Right Updated: 05/20/24 0632     Blood Culture, Routine No Growth to date      No Growth to date      No Growth to date    Narrative:      Aerobic and anaerobic  Collection has been rescheduled by [facility] at 05/18/2024 05:42 Reason:   Pt in scan/will wait  Collection has been rescheduled by [facility] at 05/18/2024 05:42 Reason:   Pt  in scan/will wait          Suspect aspiration   Initially started on rocephin/azithro in ED, would hold off on further azithro given Qtc prolongation  -swallow evaluation ordered  -QTC prolongation in proved to 430  -improved cough

## 2024-05-20 NOTE — NURSING
Dr. Pascual present to assess pt. See new medication orders in epic. Will continue current plan of care.

## 2024-05-20 NOTE — PROGRESS NOTES
[facility]  Department of Cardiology  Consult Note      PATIENT NAME: Elpidio Ayala  MRN: [id number]  TODAY'S DATE: 05/20/2024  ADMIT DATE: 5/18/2024                          CONSULT REQUESTED BY: Sg Cowan MD    SUBJECTIVE     PRINCIPAL PROBLEM: Sepsis    REASON FOR CONSULT:  Symptomatic bradycardia    Interval history:  05/20/2024  Patient seen this morning resting comfortably in ICU bed.  Family at bedside; updated on plan of care, questions answered, support provided.  Patient is intermittently confused and is unable to answer questions at this time.  Telemetry reviewed in detail, patient appears to have episodes of Wenckebach, Mobitz type 1, 2-1 block.     HPI:  Mr. Moreno is an 89-year-old male with history of BPH and dementia who was brought into the ER by his brother with complaints of increasing we worsened weakness, hypotension, and confusion.  Emesis x2 at home, found to be hypotensive in ER at 90/60.  Troponins negative, .  He was initially given some fluids in the ER for the hypotension but they have since been stopped - hypotension resolved.  Echo yesterday shows ejection fraction of 50-55%.  EKG done this morning shows sinus rhythm with first-degree AV block and PACs with a rate of 67.     Her hospital medicine notes:  Mr. Ayala is an 89 year old male with a history of BPH and dementia who presents today with complaints of AMS per his brother who went to check on him a 4 am. It is moderate. It is associated with episode of vomiting and nonproductive cough. He denies fever, chills, diarrhea, or LOC. History is limited d/t underlying dementia but is supplemented by brother who is at bedside. He lives alone and is normally oriented to person and place and somewhat situation. This morning he was only oriented to person and was slow to respond prompting his presentation. BP was soft on arrival 99/55 HR 62 satting 92% or better on room air. He was given 1L fluid bolus now with  mild hypertension /72. CXR revealed: patchy interstitial opacities, WBC 13.9K, macrocytosis, lactate 2.8, procal 0.5, , highly sensitive troponin WNL, T bili 2.9 (with chronic elevation), UA concentrated but bland, CT head with no acute intracranial abnormalities, abd US Mild right-sided hydronephrosis, Sludge in the gallbladder without findings of acute cholecystitis. Initial EKG: accelerated junctional with Qtc 519. Hospital medicine is consulted for admission for sepsis.         Review of patient's allergies indicates:   Allergen Reactions    No known drug allergies        Past Medical History:   Diagnosis Date    BPH (benign prostatic hypertrophy)     Scrotal mass     Wears glasses      Past Surgical History:   Procedure Laterality Date    EYE SURGERY  BILAT CATARACT WITH LENS    PROSTATE SURGERY      TONSILLECTOMY       Social History     Tobacco Use    Smoking status: Never    Smokeless tobacco: Never   Substance Use Topics    Alcohol use: No    Drug use: No        REVIEW OF SYSTEMS    As mentioned in HPI    OBJECTIVE     VITAL SIGNS (Most Recent)  Temp: 98.8 °F (37.1 °C) (05/20/24 0715)  Pulse: 74 (05/20/24 1400)  Resp: (!) 25 (05/20/24 1400)  BP: 139/79 (05/20/24 1200)  SpO2: 95 % (05/20/24 1200)    VENTILATION STATUS  Resp: (!) 25 (05/20/24 1400)  SpO2: 95 % (05/20/24 1200)           I & O (Last 24H):  Intake/Output Summary (Last 24 hours) at 5/20/2024 1513  Last data filed at 5/20/2024 1159  Gross per 24 hour   Intake 500 ml   Output 3600 ml   Net -3100 ml       WEIGHTS  Wt Readings from Last 3 Encounters:   05/20/24 0300 82.5 kg (181 lb 14.1 oz)   05/19/24 0400 82.3 kg (181 lb 7 oz)   05/18/24 1325 81.6 kg (180 lb)   05/18/24 0513 81.6 kg (180 lb)   05/18/24 1349 81.6 kg (180 lb)   07/14/21 1550 95.3 kg (210 lb)       PHYSICAL EXAM    CONSTITUTIONAL: NAD, elderly frail male  HEENT: Normocephalic. + pallor  NECK: no JVD  LUNGS: crackles in the bases  HEART:  Sinus rhythm with first-degree AV  block, S1, S2 normal, + murmur   ABDOMEN: soft,  EXTREMITIES: No edema  SKIN: No rash  NEURO:  Confused, dementia  PSYCH:  Confused    HOME MEDICATIONS:  No current facility-administered medications on file prior to encounter.     No current outpatient medications on file prior to encounter.       SCHEDULED MEDS:   cyanocobalamin  1,000 mcg Intramuscular Q30 Days    enoxparin  40 mg Subcutaneous Daily    folic acid  1 mg Oral Daily    piperacillin-tazobactam (Zosyn) IV (PEDS and ADULTS) (extended infusion is not appropriate)  3.375 g Intravenous Q8H    senna-docusate 8.6-50 mg  1 tablet Oral BID    thiamine  100 mg Oral Daily       CONTINUOUS INFUSIONS:    PRN MEDS:  Current Facility-Administered Medications:     acetaminophen, 650 mg, Oral, Q8H PRN    acetaminophen, 650 mg, Oral, Q4H PRN    aluminum-magnesium hydroxide-simethicone, 30 mL, Oral, QID PRN    dextrose 50%, 12.5 g, Intravenous, PRN    dextrose 50%, 25 g, Intravenous, PRN    diazePAM, 2.5 mg, Intravenous, Q20 Min PRN    glucagon (human recombinant), 1 mg, Intramuscular, PRN    glucose, 16 g, Oral, PRN    glucose, 24 g, Oral, PRN    magnesium oxide, 800 mg, Oral, PRN    magnesium oxide, 800 mg, Oral, PRN    melatonin, 6 mg, Oral, Nightly PRN    naloxone, 0.02 mg, Intravenous, PRN    ondansetron, 4 mg, Intravenous, Q6H PRN    polyethylene glycol, 17 g, Oral, BID PRN    potassium bicarbonate, 35 mEq, Oral, PRN    potassium bicarbonate, 50 mEq, Oral, PRN    potassium bicarbonate, 60 mEq, Oral, PRN    sodium chloride 0.9%, 10 mL, Intravenous, Q12H PRN    LABS AND DIAGNOSTICS     CBC LAST 3 DAYS  Recent Labs   Lab 05/18/24  0521 05/19/24  0404 05/20/24  0433   WBC 13.95* 7.66 5.98   RBC 4.60 4.05* 4.50*   HGB 15.4 13.4* 15.0   HCT 45.8 40.9 46.0   * 101* 102*   MCH 33.5* 33.1* 33.3*   MCHC 33.6 32.8 32.6   RDW 14.4 14.7* 14.4    148* 158   MPV 10.6 10.5 10.4   GRAN 90.5*  12.6* 74.8*  5.7 72.2  4.3   LYMPH 5.4*  0.8* 14.2*  1.1 16.1*  1.0  "  MONO 3.6*  0.5 9.3  0.7 8.4  0.5   BASO 0.02 0.02 0.02   NRBC 0 0 0       COAGULATION LAST 3 DAYS  Recent Labs   Lab 05/18/24  1458   INR 1.1       CHEMISTRY LAST 3 DAYS  Recent Labs   Lab 05/18/24  0521 05/19/24  0404 05/20/24  0330    137 143   K 3.7 4.4 4.0    105 112*   CO2 22* 28 25   ANIONGAP 11 4* 6*   BUN 28* 18 12   CREATININE 1.2 1.0 0.8   * 89 87   CALCIUM 8.4* 7.8* 8.8   MG 1.8 2.0 2.2   ALBUMIN 3.9 3.2* 3.5   PROT 6.1 5.2* 5.8*   ALKPHOS 54* 41* 46*   ALT 11 9* 8*   AST 15 13 15   BILITOT 2.9* 1.5* 1.3*       CARDIAC PROFILE LAST 3 DAYS  Recent Labs   Lab 05/18/24  0521 05/18/24  1458   *  --    TROPONINIHS 11.8 17.3*       ENDOCRINE LAST 3 DAYS  Recent Labs   Lab 05/18/24  0521 05/18/24  1458   TSH  --  0.942   PROCAL 0.514*  --        LAST ARTERIAL BLOOD GAS  ABG  No results for input(s): "PH", "PO2", "PCO2", "HCO3", "BE" in the last 168 hours.    LAST 7 DAYS MICROBIOLOGY   Microbiology Results (last 7 days)       Procedure Component Value Units Date/Time    Blood culture x two cultures. Draw prior to antibiotics. [[id number]] Collected: 05/18/24 0554    Order Status: Completed Specimen: Blood from Peripheral, Hand, Left Updated: 05/20/24 0632     Blood Culture, Routine No Growth to date      No Growth to date      No Growth to date    Narrative:      Aerobic and anaerobic  Collection has been rescheduled by [facility] at 05/18/2024 05:42 Reason:   Pt in scan/will wait  Collection has been rescheduled by [facility] at 05/18/2024 05:42 Reason:   Pt in scan/will wait    Blood culture x two cultures. Draw prior to antibiotics. [[id number]] Collected: 05/18/24 0555    Order Status: Completed Specimen: Blood from Peripheral, Hand, Right Updated: 05/20/24 0632     Blood Culture, Routine No Growth to date      No Growth to date      No Growth to date    Narrative:      Aerobic and anaerobic  Collection has been rescheduled by [facility] at 05/18/2024 05:42 Reason:   Pt in scan/will " wait  Collection has been rescheduled by [facility] at 05/18/2024 05:42 Reason:   Pt in scan/will wait            MOST RECENT IMAGING  Echo    Left Ventricle: The left ventricle is normal in size. Normal wall   thickness. Unable to assess wall motion. There is low normal systolic   function with a visually estimated ejection fraction of 50 - 55%.    Right Ventricle: Right ventricle was not well visualized due to poor   acoustic window. Systolic function is normal.    Aortic Valve: The aortic valve is a trileaflet valve. There is moderate   aortic regurgitation.    Mitral Valve: There is no stenosis. The mean pressure gradient across   the mitral valve is 3 mmHg at a heart rate of  bpm. There is mild   regurgitation.    Tricuspid Valve: There is mild regurgitation.  US Abdomen Limited  Narrative: CLINICAL HISTORY:  ([id number] y/o  (1935) M    Hyperbilirubinemia;    TECHNIQUE:  (A#[id number], exam time 5/18/2024 7:38)    US ABDOMEN LIMITED [id number]    Right upper quadrant ultrasound, images obtained in grayscale and color. Additional Doppler images of the portal vein were obtained.    COMPARISON:  None available.    FINDINGS:  Please note this examination is technically suboptimal due to patient related factors  including body habitus as well as overlying bowel gas.    The LIVER is normal in size and contour at 16.5 cm (sagittal right lobe). Hepatic parenchyma has normal echogenicity with normal delineation of the periportal triads. No definite intrahepatic masses are noted. The portal vein is patent with hepatopetal flow .    The BILIARY SYSTEM is normal in caliber; the common hepatic duct measures 5 mm. There are is sludge seen in the GALL BLADDER. There is no gallbladder wall thickening, pericholecystic fluid or sonographic Strickland sign to suggest acute cholecystitis.    The PANCREATIC head is partially visualized but grossly normal in appearance. The pancreatic duct is not dilated.    The right KIDNEY is normal in  size at 12.8 x 5.9 x 4.8 cm and echogenicity/texture.  There is mild right-sided hydronephrosis.  No shadowing stones, contour deforming renal mass or cyst is identified.    The AORTA and IVC are partially visualized and unremarkable.  Impression: 1.  Mild right-sided hydronephrosis.    2.  Sludge in the gallbladder without findings of acute cholecystitis.    3.  No intra/extrahepatic biliary ductal dilation.    .    Electronically signed by: Colby Guerrero  Date:    05/18/2024  Time:    07:40  X-Ray Chest AP Portable  Narrative: EXAMINATION:  XR CHEST AP PORTABLE    CLINICAL HISTORY:  Sepsis;    TECHNIQUE:  Single frontal view of the chest was performed.    COMPARISON:  Chest radiograph performed 04/16/2020, 09:06 hours.    FINDINGS:  Monitoring leads overlie the chest.  Cardiomediastinal contours grossly unchanged.  Atherosclerosis of the aorta.    Patchy interstitial predominant opacities are noted bilaterally.    No definite pneumothorax or large volume pleural effusion.    No acute findings in the visualized abdomen.    Osseous and soft tissue structures appear without definite acute change.  Impression: Patchy interstitial predominant opacities bilaterally could relate to edema versus infectious or non infectious inflammatory process.    Electronically signed by: Conor Stokes  Date:    05/18/2024  Time:    06:28  CT Head Without Contrast  Narrative: EXAMINATION:  CT HEAD WITHOUT CONTRAST    CLINICAL HISTORY:  Mental status change, unknown cause;    TECHNIQUE:  Low dose axial images were obtained through the head.  Coronal and sagittal reformations were also performed. Contrast was not administered.    COMPARISON:  MRI brain performed 04/16/2020. CT head 04/16/2020    FINDINGS:  Blood: No acute intracranial hemorrhage.    Parenchyma: No definite loss of gray-white differentiation to suggest acute or subacute transcortical infarct. Generalized pattern age-related parenchymal volume loss.  Nonspecific areas of  white matter hypoattenuation may reflect sequela of chronic small vessel ischemic disease.  Lacunar type insults involve the deep gray nuclei.    Ventricles/Extra-axial spaces: No abnormal extra-axial fluid collection. Basal cisterns are patent.    Vessels: Mild atherosclerotic calcifications.    Orbits: Status post bilateral lens replacements.    Scalp: Unremarkable.    Skull: There are no depressed skull fractures or destructive bone lesions.    Sinuses and mastoids: Scattered paranasal sinus mucosal thickening.  Near complete chronic appearing opacification of the right sphenoid sinus with osteitis of the sinus walls.  Relatively modest paranasal sinus mucosal thickening elsewhere.    Other findings: None  Impression: No definite acute intracranial findings by noncontrast CT.    Electronically signed by: Conor Stokes  Date:    05/18/2024  Time:    06:27      ECHOCARDIOGRAM RESULTS (last 5)  Results for orders placed during the hospital encounter of 05/18/24    Echo    Interpretation Summary    Left Ventricle: The left ventricle is normal in size. Normal wall thickness. Unable to assess wall motion. There is low normal systolic function with a visually estimated ejection fraction of 50 - 55%.    Right Ventricle: Right ventricle was not well visualized due to poor acoustic window. Systolic function is normal.    Aortic Valve: The aortic valve is a trileaflet valve. There is moderate aortic regurgitation.    Mitral Valve: There is no stenosis. The mean pressure gradient across the mitral valve is 3 mmHg at a heart rate of  bpm. There is mild regurgitation.    Tricuspid Valve: There is mild regurgitation.      CURRENT/PREVIOUS VISIT EKG  Results for orders placed or performed during the hospital encounter of 05/18/24   EKG 12-lead    Collection Time: 05/19/24  3:18 PM   Result Value Ref Range    QRS Duration 94 ms    [facility] QTC Calculation 448 ms    Narrative    Test Reason : I49.9,    Vent. Rate : 072 BPM     Atrial  Rate : 072 BPM     P-R Int : 218 ms          QRS Dur : 094 ms      QT Int : 410 ms       P-R-T Axes : 000 024 057 degrees     QTc Int : 448 ms    Sinus rhythm with 1st degree A-V block with Premature atrial complexes  Otherwise normal ECG  When compared with ECG of 19-MAY-2024 08:15,  No significant change was found    Referred By: [email or web address]   SELF           Confirmed By:            ASSESSMENT/PLAN:     Active Hospital Problems    Diagnosis    *Sepsis    Cognitive impairment    Encephalopathy, metabolic    Pneumonia    Elevated bilirubin    BPH (benign prostatic hyperplasia)    Symptomatic bradycardia    Macrocytosis       ASSESSMENT & PLAN:     Sepsis  bradycardia  Cognitive impairment  Metabolic encephalopathy  Pneumonia  Elevated bilirubin  BPH  Macrocytosis      RECOMMENDATIONS:    Patient's telemetry appeared stable overnight with a Wenckebach, Mobitz type 1, 2-1 block noted.  Ideally would like to keep overnight again on continuous telemetry.  Could also consider the need for a 30 day Zio monitor as an outpatient to further assess   Altogether patient appears stable at this time and is only having these bradycardic episodes while asleep.  Would ideally also like to get the patient up and walking around the unit if at all possible to see if heart rate goes up with exertion.  We will continue to follow at this time.    Antonette Kulkarni NP  Department of Cardiology  Date of Service: 05/20/2024        I have personally interviewed and examined the patient, I have reviewed the Nurse Practitioner's history and physical, assessment, and plan. I agree with the findings and plan.  Telemetry reviewed.  Patient with the brief episodes of bradycardia in 30s only during hours of sleep.  Wenckebach in 2-1 AV block noted during that time.  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  Otherwise Mostly his heart rate is ranging between 60s and 90s when he is awake.  Sometimes the heart rate is up to  110 when he is awake.  Continue further tele monitoring tonight.  Advised to ambulate on the unit to assess the heart rate.  Event monitor upon discharge.    PAZ LUONG M.D.  Department of Cardiology  Date of Service: 05/20/2024

## 2024-05-20 NOTE — HOSPITAL COURSE
Patient was admitted to intensive care unit where patient was closely monitored.  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  Cardiology team closely followed and discussed the case with [facility] for consideration for pacemaker placement.  No pacemaker or cardiac intervention was recommended.  Acute kidney injury resolved with IV fluid hydration.  Patient noted to have vitamin B12 deficiency for which patient was started on supplementation.  Microbiology results closely followed.  Patient remained afebrile.  Patient was maintained on antibiotic therapy.  Patient noted to have extensive progressive dementia with short-term memory problem.  Patient's brother confirmed DNR code status and requested [facility] nursing home placement since patient lives alone and not able to take care of himself anymore.   consulted.

## 2024-05-20 NOTE — ASSESSMENT & PLAN NOTE
Continue telemonitoring.   This patient does have evidence of infective focus  My overall impression is sepsis.  Source: Respiratory  Antibiotics given-   Antibiotics (72h ago, onward)      Start     Stop Route Frequency Ordered    05/18/24 1500  piperacillin-tazobactam 3.375 g in dextrose 5 % 100 mL IVPB (ready to mix)         -- IV Every 8 hours (non-standard times) 05/18/24 1349          Latest lactate reviewed-  Recent Labs   Lab 05/20/24  0911   LACTATE 1.2       Organ dysfunction indicated by Acute kidney injury    Fluid challenge: received 1L bolus, MAP never dropped below 65, getting LR at 130mL for now     Post- resuscitation assessment No - Post resuscitation assessment not needed       Will Not start Pressors- Levophed for MAP of 65  Source control achieved by: abx    -continue with Zosyn  -low probability of UTI  -blood cultures pending  -vitals within normal limits.    -Lactic acid 1.5  -source possibly due to aspiration pneumonia.  -we will order swallow evaluation, to rule out aspiration

## 2024-05-21 ENCOUNTER — TELEPHONE (OUTPATIENT)
Dept: PRIMARY CARE CLINIC | Facility: CLINIC | Age: 89
End: 2024-05-21
Payer: MEDICARE

## 2024-05-21 VITALS
OXYGEN SATURATION: 97 % | TEMPERATURE: 98 F | DIASTOLIC BLOOD PRESSURE: 76 MMHG | RESPIRATION RATE: 25 BRPM | BODY MASS INDEX: 24.11 KG/M2 | HEIGHT: 73 IN | HEART RATE: 55 BPM | SYSTOLIC BLOOD PRESSURE: 142 MMHG | WEIGHT: 181.88 LBS

## 2024-05-21 DIAGNOSIS — R55 SYNCOPE AND COLLAPSE: ICD-10-CM

## 2024-05-21 DIAGNOSIS — I45.9 HEART BLOCK: Primary | ICD-10-CM

## 2024-05-21 LAB
ALBUMIN SERPL BCP-MCNC: 3.3 G/DL (ref 3.5–5.2)
ALP SERPL-CCNC: 44 U/L (ref 55–135)
ALT SERPL W/O P-5'-P-CCNC: 9 U/L (ref 10–44)
ANION GAP SERPL CALC-SCNC: 7 MMOL/L (ref 8–16)
AST SERPL-CCNC: 13 U/L (ref 10–40)
BASOPHILS # BLD AUTO: 0.02 K/UL (ref 0–0.2)
BASOPHILS NFR BLD: 0.3 % (ref 0–1.9)
BILIRUB SERPL-MCNC: 1.1 MG/DL (ref 0.1–1)
BUN SERPL-MCNC: 13 MG/DL (ref 8–23)
CALCIUM SERPL-MCNC: 8.6 MG/DL (ref 8.7–10.5)
CHLORIDE SERPL-SCNC: 109 MMOL/L (ref 95–110)
CO2 SERPL-SCNC: 26 MMOL/L (ref 23–29)
CREAT SERPL-MCNC: 0.9 MG/DL (ref 0.5–1.4)
DIFFERENTIAL METHOD BLD: ABNORMAL
EOSINOPHIL # BLD AUTO: 0.3 K/UL (ref 0–0.5)
EOSINOPHIL NFR BLD: 4.5 % (ref 0–8)
ERYTHROCYTE [DISTWIDTH] IN BLOOD BY AUTOMATED COUNT: 14.7 % (ref 11.5–14.5)
EST. GFR  (NO RACE VARIABLE): >60 ML/MIN/1.73 M^2
GLUCOSE SERPL-MCNC: 81 MG/DL (ref 70–110)
HCT VFR BLD AUTO: 43.8 % (ref 40–54)
HGB BLD-MCNC: 14.5 G/DL (ref 14–18)
IMM GRANULOCYTES # BLD AUTO: 0.03 K/UL (ref 0–0.04)
IMM GRANULOCYTES NFR BLD AUTO: 0.5 % (ref 0–0.5)
LYMPHOCYTES # BLD AUTO: 1.6 K/UL (ref 1–4.8)
LYMPHOCYTES NFR BLD: 27.1 % (ref 18–48)
MAGNESIUM SERPL-MCNC: 2 MG/DL (ref 1.6–2.6)
MCH RBC QN AUTO: 33.8 PG (ref 27–31)
MCHC RBC AUTO-ENTMCNC: 33.1 G/DL (ref 32–36)
MCV RBC AUTO: 102 FL (ref 82–98)
MONOCYTES # BLD AUTO: 0.5 K/UL (ref 0.3–1)
MONOCYTES NFR BLD: 8.5 % (ref 4–15)
NEUTROPHILS # BLD AUTO: 3.6 K/UL (ref 1.8–7.7)
NEUTROPHILS NFR BLD: 59.1 % (ref 38–73)
NRBC BLD-RTO: 0 /100 WBC
OHS QRS DURATION: 104 MS
OHS QTC CALCULATION: 369 MS
PLATELET # BLD AUTO: 163 K/UL (ref 150–450)
PMV BLD AUTO: 10.5 FL (ref 9.2–12.9)
POTASSIUM SERPL-SCNC: 4.4 MMOL/L (ref 3.5–5.1)
PROT SERPL-MCNC: 5.5 G/DL (ref 6–8.4)
RBC # BLD AUTO: 4.29 M/UL (ref 4.6–6.2)
SODIUM SERPL-SCNC: 142 MMOL/L (ref 136–145)
WBC # BLD AUTO: 6.01 K/UL (ref 3.9–12.7)

## 2024-05-21 PROCEDURE — 86580 TB INTRADERMAL TEST: CPT | Performed by: INTERNAL MEDICINE

## 2024-05-21 PROCEDURE — 80053 COMPREHEN METABOLIC PANEL: CPT | Performed by: NURSE PRACTITIONER

## 2024-05-21 PROCEDURE — 63600175 PHARM REV CODE 636 W HCPCS: Performed by: NURSE PRACTITIONER

## 2024-05-21 PROCEDURE — 25000003 PHARM REV CODE 250: Performed by: NURSE PRACTITIONER

## 2024-05-21 PROCEDURE — 36415 COLL VENOUS BLD VENIPUNCTURE: CPT | Performed by: NURSE PRACTITIONER

## 2024-05-21 PROCEDURE — 85025 COMPLETE CBC W/AUTO DIFF WBC: CPT | Performed by: NURSE PRACTITIONER

## 2024-05-21 PROCEDURE — 94761 N-INVAS EAR/PLS OXIMETRY MLT: CPT

## 2024-05-21 PROCEDURE — 30200315 PPD INTRADERMAL TEST REV CODE 302: Performed by: INTERNAL MEDICINE

## 2024-05-21 PROCEDURE — 83735 ASSAY OF MAGNESIUM: CPT | Performed by: NURSE PRACTITIONER

## 2024-05-21 PROCEDURE — 99233 SBSQ HOSP IP/OBS HIGH 50: CPT | Mod: ,,, | Performed by: INTERNAL MEDICINE

## 2024-05-21 PROCEDURE — 99900031 HC PATIENT EDUCATION (STAT)

## 2024-05-21 RX ORDER — MUPIROCIN 20 MG/G
OINTMENT TOPICAL 2 TIMES DAILY
Status: DISCONTINUED | OUTPATIENT
Start: 2024-05-21 | End: 2024-05-21 | Stop reason: HOSPADM

## 2024-05-21 RX ORDER — UBIDECARENONE 75 MG
500 CAPSULE ORAL DAILY
Qty: 30 TABLET | Refills: 0 | Status: SHIPPED | OUTPATIENT
Start: 2024-05-21 | End: 2024-06-11

## 2024-05-21 RX ORDER — AMOXICILLIN AND CLAVULANATE POTASSIUM 875; 125 MG/1; MG/1
1 TABLET, FILM COATED ORAL 2 TIMES DAILY
Qty: 10 TABLET | Refills: 0 | Status: SHIPPED | OUTPATIENT
Start: 2024-05-21 | End: 2024-06-11

## 2024-05-21 RX ADMIN — THIAMINE HCL TAB 100 MG 100 MG: 100 TAB at 08:05

## 2024-05-21 RX ADMIN — SENNOSIDES AND DOCUSATE SODIUM 1 TABLET: 50; 8.6 TABLET ORAL at 08:05

## 2024-05-21 RX ADMIN — TUBERCULIN PURIFIED PROTEIN DERIVATIVE 5 UNITS: 5 INJECTION, SOLUTION INTRADERMAL at 01:05

## 2024-05-21 RX ADMIN — FOLIC ACID 1 MG: 1 TABLET ORAL at 08:05

## 2024-05-21 RX ADMIN — PIPERACILLIN SODIUM AND TAZOBACTAM SODIUM 3.38 G: 3; .375 INJECTION, POWDER, LYOPHILIZED, FOR SOLUTION INTRAVENOUS at 08:05

## 2024-05-21 NOTE — DISCHARGE SUMMARY
Critical access hospital Medicine  Discharge Summary      Patient Name: Elpidio Ayala  MRN: [id number]  [facility]: [id number]  Patient Class: IP- Inpatient  Admission Date: 5/18/2024  Hospital Length of Stay: 1 days  Discharge Date and Time:  05/21/2024 2:35 PM  Attending Physician: Sg Cowan MD   Discharging Provider: Sg Cowan MD  Primary Care Provider: Cali Win Jr., MD    Primary Care Team: Networked reference to record PCT     HPI:   Mr. Ayala is an 89 year old male with a history of BPH and dementia who presents today with complaints of AMS per his brother who went to check on him a 4 am. It is moderate. It is associated with episode of vomiting and nonproductive cough. He denies fever, chills, diarrhea, or LOC. History is limited d/t underlying dementia but is supplemented by brother who is at bedside. He lives alone and is normally oriented to person and place and somewhat situation. This morning he was only oriented to person and was slow to respond prompting his presentation. BP was soft on arrival 99/55 HR 62 satting 92% or better on room air. He was given 1L fluid bolus now with mild hypertension /72. CXR revealed: patchy interstitial opacities, WBC 13.9K, macrocytosis, lactate 2.8, procal 0.5, , highly sensitive troponin WNL, T bili 2.9 (with chronic elevation), UA concentrated but bland, CT head with no acute intracranial abnormalities, abd US Mild right-sided hydronephrosis, Sludge in the gallbladder without findings of acute cholecystitis. Initial EKG: accelerated junctional with Qtc 519. Hospital medicine is consulted for admission for sepsis.     * No surgery found *      Hospital Course:   Patient was admitted to intensive care unit where patient was closely monitored.  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  Cardiology team closely followed and discussed the case with [facility] for consideration for pacemaker placement.  No  pacemaker or cardiac intervention was recommended.  Acute kidney injury resolved with IV fluid hydration.  Patient noted to have vitamin B12 deficiency for which patient was started on supplementation.  Microbiology results closely followed.  Patient remained afebrile.  Patient was maintained on antibiotic therapy.  Patient noted to have extensive progressive dementia with short-term memory problem.  Patient's brother confirmed DNR code status and requested assistance with Assisted Living center placement.  assisted patient and family member. Home health skilled registered nurse, home PT and OT have been arranged.  Patient will have a surveillance BMP checked next week.  Patient encouraged to stay well hydrated.  Patient feels ready to go home.  Cardiology has cleared the patient for discharge.  Outpatient 30 day Zio monitor has been set up.  Patient to complete antibiotic therapy course as directed.    Goals of Care Treatment Preferences:  Code Status: DNR    Living Will: Yes              Consults:   Consults (From admission, onward)          Status Ordering Provider     Inpatient consult to Cardiology  Once        Provider:  Kevon May MD    Completed MAICOL MARQUEZ          Microbiology Results (last 7 days)       Procedure Component Value Units Date/Time    Blood culture x two cultures. Draw prior to antibiotics. [[id number]] Collected: 05/18/24 0555    Order Status: Completed Specimen: Blood from Peripheral, Hand, Right Updated: 05/21/24 0632     Blood Culture, Routine No Growth to date      No Growth to date      No Growth to date      No Growth to date    Narrative:      Aerobic and anaerobic  Collection has been rescheduled by [facility] at 05/18/2024 05:42 Reason:   Pt in scan/will wait  Collection has been rescheduled by [facility] at 05/18/2024 05:42 Reason:   Pt in scan/will wait    Blood culture x two cultures. Draw prior to antibiotics. [[id number]] Collected: 05/18/24 0554    Order Status:  Completed Specimen: Blood from Peripheral, Hand, Left Updated: 05/21/24 0632     Blood Culture, Routine No Growth to date      No Growth to date      No Growth to date      No Growth to date    Narrative:      Aerobic and anaerobic  Collection has been rescheduled by [facility] at 05/18/2024 05:42 Reason:   Pt in scan/will wait  Collection has been rescheduled by [facility] at 05/18/2024 05:42 Reason:   Pt in scan/will wait          ECHO:    Left Ventricle: The left ventricle is normal in size. Normal wall thickness. Unable to assess wall motion. There is low normal systolic function with a visually estimated ejection fraction of 50 - 55%.    Right Ventricle: Right ventricle was not well visualized due to poor acoustic window. Systolic function is normal.    Aortic Valve: The aortic valve is a trileaflet valve. There is moderate aortic regurgitation.    Mitral Valve: There is no stenosis. The mean pressure gradient across the mitral valve is 3 mmHg at a heart rate of  bpm. There is mild regurgitation.    Tricuspid Valve: There is mild regurgitation.     CXR:  Patchy interstitial predominant opacities bilaterally could relate to edema versus infectious or non infectious inflammatory process.      CT Head without contrast:  No definite acute intracranial findings by noncontrast CT.      RUQ abdominal US:  1.  Mild right-sided hydronephrosis.  2.  Sludge in the gallbladder without findings of acute cholecystitis.  3.  No intra/extrahepatic biliary ductal dilation.    Final Active Diagnoses:    Diagnosis Date Noted POA    PRINCIPAL PROBLEM:  Sepsis [A41.9] 05/18/2024 Yes    Cognitive impairment [R41.89] 05/19/2024 Yes    Encephalopathy, metabolic [G93.41] 05/18/2024 Yes    Pneumonia [J18.9] 05/18/2024 Yes    Elevated bilirubin [R17] 05/18/2024 Yes    BPH (benign prostatic hyperplasia) [N40.0] 05/18/2024 Yes    Symptomatic bradycardia [R00.1] 05/18/2024 No    Macrocytosis [D75.89] 05/18/2024 Yes      Problems Resolved During this  Admission:       Discharged Condition: good    Disposition: Home/Assisted Living Facility    Follow Up:   Contact information for follow-up providers       Cali Win Jr., MD Follow up in 1 week(s).    Specialty: Family Medicine  Contact information:  [address]  [address]  [place] [facility] [place] [address]  [phone]               Crystal Lopez MD Follow up in 2 week(s).    Specialty: Cardiology  Contact information:  [address]  [address]  [place] [address]  [phone]                       Contact information for after-discharge care       Home Medical Care       [facility] .    Services: Home Nursing, Home Rehabilitation  Contact information:  [address]  [place] [address]  [phone]                                 Patient Instructions:      Ambulatory referral/consult to Home Health   Standing Status: Future   Referral Priority: Routine Referral Type: Home Health   Referral Reason: Specialty Services Required   Requested Specialty: Home Health Services   Number of Visits Requested: 1     Diet Adult Regular     Notify your health care provider if you experience any of the following:  temperature >100.4     Notify your health care provider if you experience any of the following:  persistent nausea and vomiting or diarrhea     Notify your health care provider if you experience any of the following:  severe uncontrolled pain     Notify your health care provider if you experience any of the following:  redness, tenderness, or signs of infection (pain, swelling, redness, odor or green/yellow discharge around incision site)     Notify your health care provider if you experience any of the following:  difficulty breathing or increased cough     Notify your health care provider if you experience any of the following:  severe persistent headache     Notify your health care provider if you experience any of the following:  worsening rash     Notify your health care provider if you  experience any of the following:  persistent dizziness, light-headedness, or visual disturbances     Notify your health care provider if you experience any of the following:  increased confusion or weakness     Activity as tolerated   Order Comments: Fall precautions       Significant Diagnostic Studies: Labs: CMP   Recent Labs   Lab 05/20/24  0330 05/21/24  0310    142   K 4.0 4.4   * 109   CO2 25 26   GLU 87 81   BUN 12 13   CREATININE 0.8 0.9   CALCIUM 8.8 8.6*   PROT 5.8* 5.5*   ALBUMIN 3.5 3.3*   BILITOT 1.3* 1.1*   ALKPHOS 46* 44*   AST 15 13   ALT 8* 9*   ANIONGAP 6* 7*   , CBC   Recent Labs   Lab 05/20/24  0433 05/21/24  0310   WBC 5.98 6.01   HGB 15.0 14.5   HCT 46.0 43.8    163   , and INR   Lab Results   Component Value Date    INR 1.1 05/18/2024    INR 1.2 07/14/2021    INR 1.2 12/20/2020       Pending Diagnostic Studies:       Procedure Component Value Units Date/Time    EKG 12-lead [[id number]] Collected: 05/20/24 0340    Order Status: Sent Lab Status: In process Updated: 05/21/24 0508     QRS Duration 104 ms      [facility] QTC Calculation 369 ms     Narrative:      Test Reason : I49.9,    Vent. Rate : 042 BPM     Atrial Rate : 042 BPM     P-R Int : 216 ms          QRS Dur : 104 ms      QT Int : 442 ms       P-R-T Axes : 064 006 023 degrees     QTc Int : 369 ms    Marked sinus bradycardia with 1st degree A-V block with Premature atrial  complexes  Abnormal ECG  When compared with ECG of 19-MAY-2024 15:18,  Vent. rate has decreased BY  30 BPM  QT has shortened    Referred By: [email or web address]   SELF           Confirmed By:     EKG 12-lead [[id number]]     Order Status: Sent Lab Status: No result            Medications:  Reconciled Home Medications:      Medication List        START taking these medications      amoxicillin-clavulanate 875-125mg 875-125 mg per tablet  Commonly known as: AUGMENTIN  Take 1 tablet by mouth 2 (two) times daily.     cyanocobalamin 500 MCG tablet  Commonly known as:  VITAMIN B-12  Take 1 tablet (500 mcg total) by mouth once daily.              Indwelling Lines/Drains at time of discharge:   Lines/Drains/Airways       None                   Time spent on the discharge of patient: 31 minutes        Sg Cowan MD  Department of Hospital Medicine  [facility]

## 2024-05-21 NOTE — PROGRESS NOTES
[facility]  Progress Note    Patient Name: Elpidio Ayala  MRN: [id number]  Patient Class: IP- Inpatient   Admission Date: 5/18/2024  Length of Stay: 1 days  Attending Physician: Sg Cowan MD  Primary Care Provider: Cali Win Jr., MD        Subjective:     Principal Problem:Sepsis        HPI:  Mr. Ayala is an 89 year old male with a history of BPH and dementia who presents today with complaints of AMS per his brother who went to check on him a 4 am. It is moderate. It is associated with episode of vomiting and nonproductive cough. He denies fever, chills, diarrhea, or LOC. History is limited d/t underlying dementia but is supplemented by brother who is at bedside. He lives alone and is normally oriented to person and place and somewhat situation. This morning he was only oriented to person and was slow to respond prompting his presentation. BP was soft on arrival 99/55 HR 62 satting 92% or better on room air. He was given 1L fluid bolus now with mild hypertension /72. CXR revealed: patchy interstitial opacities, WBC 13.9K, macrocytosis, lactate 2.8, procal 0.5, , highly sensitive troponin WNL, T bili 2.9 (with chronic elevation), UA concentrated but bland, CT head with no acute intracranial abnormalities, abd US Mild right-sided hydronephrosis, Sludge in the gallbladder without findings of acute cholecystitis. Initial EKG: accelerated junctional with Qtc 519. Hospital medicine is consulted for admission for sepsis.     Overview/Hospital Course:  Patient was admitted to intensive care unit where patient was closely monitored.  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  Cardiology team closely followed and discussed the case with [facility] for consideration for pacemaker placement.  No pacemaker or cardiac intervention was recommended.  Acute kidney injury resolved with IV fluid hydration.  Patient noted to have vitamin B12  deficiency for which patient was started on supplementation.  Microbiology results closely followed.  Patient remained afebrile.  Patient was maintained on antibiotic therapy.  Patient noted to have extensive progressive dementia with short-term memory problem.  Patient's brother confirmed DNR code status and requested senior care nursing home placement since patient lives alone and not able to take care of himself anymore.   consulted.    Interval History:  Patient seen and examined. Afebrile. No subjective complaints - confused, poor short-term memory problem. Brother confirms DNR code status and in agreement with alf NH placement.     Review of Systems   Constitutional:  Negative for activity change, chills and fever.   HENT:  Negative for congestion, rhinorrhea and sore throat.    Eyes:  Negative for discharge and redness.   Respiratory:  Negative for cough, chest tightness, shortness of breath and wheezing.    Cardiovascular:  Negative for chest pain, palpitations and leg swelling.   Gastrointestinal:  Negative for abdominal pain, constipation, diarrhea, nausea and vomiting.   Genitourinary:  Negative for dysuria, flank pain and hematuria.   Musculoskeletal:  Negative for back pain, myalgias and neck pain.   Skin:  Negative for pallor, rash and wound.   Neurological:  Negative for dizziness, tremors, syncope, weakness, numbness and headaches.   Psychiatric/Behavioral:  Negative for agitation, confusion and hallucinations.      Objective:     Vital Signs (Most Recent):  Temp: 97.7 °F (36.5 °C) (05/21/24 0701)  Pulse: (!) 49 (05/21/24 0701)  Resp: (!) 21 (05/21/24 0701)  BP: (!) 176/81 (05/21/24 0701)  SpO2: 97 % (05/21/24 0701) Vital Signs (24h Range):  Temp:  [97.7 °F (36.5 °C)-98.9 °F (37.2 °C)] 97.7 °F (36.5 °C)  Pulse:  [49-94] 49  Resp:  [15-34] 21  SpO2:  [92 %-98 %] 97 %  BP: (110-176)/(56-84) 176/81     Weight: 82.5 kg (181 lb 14.1 oz)  Body mass index is 24 kg/m².    Intake/Output  Summary (Last 24 hours) at 5/21/2024 0826  Last data filed at 5/21/2024 0329  Gross per 24 hour   Intake 200 ml   Output 875 ml   Net -675 ml         Physical Exam  Vitals and nursing note reviewed.   Constitutional:       General: He is not in acute distress.  HENT:      Head: Normocephalic and atraumatic.      Mouth/Throat:      Mouth: Mucous membranes are moist.   Eyes:      General:         Right eye: No discharge.         Left eye: No discharge.      Conjunctiva/sclera: Conjunctivae normal.   Cardiovascular:      Rate and Rhythm: Normal rate and regular rhythm.      Pulses: Normal pulses.   Pulmonary:      Effort: Pulmonary effort is normal.      Breath sounds: Normal breath sounds.   Abdominal:      General: Bowel sounds are normal.      Palpations: Abdomen is soft.   Musculoskeletal:         General: No swelling. Normal range of motion.      Cervical back: Normal range of motion and neck supple.   Skin:     General: Skin is warm and dry.   Neurological:      Mental Status: He is alert. Mental status is at baseline.      Comments: Oriented X2   Psychiatric:         Mood and Affect: Mood normal.             Significant Labs:  Lab Results   Component Value Date    WBC 6.01 05/21/2024    HGB 14.5 05/21/2024    HCT 43.8 05/21/2024     (H) 05/21/2024     05/21/2024       CMP  Sodium   Date Value Ref Range Status   05/21/2024 142 136 - 145 mmol/L Final     Potassium   Date Value Ref Range Status   05/21/2024 4.4 3.5 - 5.1 mmol/L Final     Chloride   Date Value Ref Range Status   05/21/2024 109 95 - 110 mmol/L Final     CO2   Date Value Ref Range Status   05/21/2024 26 23 - 29 mmol/L Final     Glucose   Date Value Ref Range Status   05/21/2024 81 70 - 110 mg/dL Final     BUN   Date Value Ref Range Status   05/21/2024 13 8 - 23 mg/dL Final     Creatinine   Date Value Ref Range Status   05/21/2024 0.9 0.5 - 1.4 mg/dL Final   07/12/2012 0.7 0.2 - 1.4 mg/dl Final     Calcium   Date Value Ref Range Status    05/21/2024 8.6 (L) 8.7 - 10.5 mg/dL Final   07/12/2012 9.7 8.6 - 10.2 mg/dl Final     Total Protein   Date Value Ref Range Status   05/21/2024 5.5 (L) 6.0 - 8.4 g/dL Final     Albumin   Date Value Ref Range Status   05/21/2024 3.3 (L) 3.5 - 5.2 g/dL Final     Total Bilirubin   Date Value Ref Range Status   05/21/2024 1.1 (H) 0.1 - 1.0 mg/dL Final     Comment:     For infants and newborns, interpretation of results should be based  on gestational age, weight and in agreement with clinical  observations.    Premature Infant recommended reference ranges:  Up to 24 hours.............<8.0 mg/dL  Up to 48 hours............<12.0 mg/dL  3-5 days..................<15.0 mg/dL  6-29 days.................<15.0 mg/dL       Alkaline Phosphatase   Date Value Ref Range Status   05/21/2024 44 (L) 55 - 135 U/L Final     AST   Date Value Ref Range Status   05/21/2024 13 10 - 40 U/L Final     ALT   Date Value Ref Range Status   05/21/2024 9 (L) 10 - 44 U/L Final     Anion Gap   Date Value Ref Range Status   05/21/2024 7 (L) 8 - 16 mmol/L Final   07/12/2012 7 5 - 15 meq/L Final     eGFR   Date Value Ref Range Status   05/21/2024 >60.0 >60 mL/min/1.73 m^2 Final     Microbiology Results (last 7 days)       Procedure Component Value Units Date/Time    Blood culture x two cultures. Draw prior to antibiotics. [[id number]] Collected: 05/18/24 0555    Order Status: Completed Specimen: Blood from Peripheral, Hand, Right Updated: 05/21/24 0632     Blood Culture, Routine No Growth to date      No Growth to date      No Growth to date      No Growth to date    Narrative:      Aerobic and anaerobic  Collection has been rescheduled by SAIGE at 05/18/2024 05:42 Reason:   Pt in scan/will wait  Collection has been rescheduled by [facility] at 05/18/2024 05:42 Reason:   Pt in scan/will wait    Blood culture x two cultures. Draw prior to antibiotics. [[id number]] Collected: 05/18/24 0554    Order Status: Completed Specimen: Blood from Peripheral, Hand, Left  Updated: 05/21/24 0632     Blood Culture, Routine No Growth to date      No Growth to date      No Growth to date      No Growth to date    Narrative:      Aerobic and anaerobic  Collection has been rescheduled by [facility] at 05/18/2024 05:42 Reason:   Pt in scan/will wait  Collection has been rescheduled by [facility] at 05/18/2024 05:42 Reason:   Pt in scan/will wait          Significant Imaging:  ECHO:    Left Ventricle: The left ventricle is normal in size. Normal wall thickness. Unable to assess wall motion. There is low normal systolic function with a visually estimated ejection fraction of 50 - 55%.    Right Ventricle: Right ventricle was not well visualized due to poor acoustic window. Systolic function is normal.    Aortic Valve: The aortic valve is a trileaflet valve. There is moderate aortic regurgitation.    Mitral Valve: There is no stenosis. The mean pressure gradient across the mitral valve is 3 mmHg at a heart rate of  bpm. There is mild regurgitation.    Tricuspid Valve: There is mild regurgitation.    CXR:  Patchy interstitial predominant opacities bilaterally could relate to edema versus infectious or non infectious inflammatory process.     CT Head without contrast:  No definite acute intracranial findings by noncontrast CT.     RUQ abdominal US:  1.  Mild right-sided hydronephrosis.  2.  Sludge in the gallbladder without findings of acute cholecystitis.  3.  No intra/extrahepatic biliary ductal dilation.    Assessment/Plan:      * Sepsis  Continue telemonitoring.   This patient does have evidence of infective focus  My overall impression is sepsis.  Source: Respiratory  Antibiotics given-   Antibiotics (72h ago, onward)      Start     Stop Route Frequency Ordered    05/18/24 1500  piperacillin-tazobactam 3.375 g in dextrose 5 % 100 mL IVPB (ready to mix)         -- IV Every 8 hours (non-standard times) 05/18/24 1349          Latest lactate reviewed-  Recent Labs   Lab 05/20/24  0911   LACTATE 1.2        Organ dysfunction indicated by Acute kidney injury    Fluid challenge: received 1L bolus, MAP never dropped below 65, getting LR at 130mL for now     Post- resuscitation assessment No - Post resuscitation assessment not needed       Will Not start Pressors- Levophed for MAP of 65  Source control achieved by: abx    -continue with Zosyn  -low probability of UTI  -blood cultures pending  -vitals within normal limits.    -Lactic acid 1.5  -source possibly due to aspiration pneumonia.  -we will order swallow evaluation, to rule out aspiration    Cognitive impairment  -diagnosis, per patient's brother  -alert and oriented x2 at baseline  -patient currently needing sitter for safety  -vitamin B12 low, 199.  Replete and monitor      Macrocytosis  Vitamin B12 deficiency - start supplementation. Thiamine and folic acid wnl  Ethanol levels <10      Symptomatic bradycardia  Pt arrived to floor and HR noted in the 40s, now 63  Repeat EKG Second degree AV block Mobitz I HR 50s  -Cards following.  Pacemaker being considered due to fluctuating heart rate but not at this time  -TSH wnl  -Echo results reviewed  -telemetry monitor  -maintain pacer pads.    -Avoid beta-blockers      BPH (benign prostatic hyperplasia)  Mild right sided hydronephrosis likely d/t this  Urine is bland without signs of infection         Elevated bilirubin  Present over the last few years, but a little worse which may be d/t mild dehydration  Gilbert's?  Abd US with CBD WNL  -Does have macrocytosis - alcohol?  Patient and brother denied history of alcoholism  -Ethanol levels <10  -PT/INR wnl  -Trend CMP  -negative for Strickland sign        Pneumonia  Patient has a diagnosis of pneumonia. The cause of the pneumonia is suspected to be bacterial in etiology but organism is not known. The pneumonia is stable. The patient has the following signs/symptoms of pneumonia: cough. The patient does not have a current oxygen requirement and the patient does not have  a home oxygen requirement. I have reviewed the pertinent imaging. The following cultures have been collected: Blood cultures The culture results are listed below.     Current antimicrobial regimen consists of the antibiotics listed below. Will monitor patient closely and continue current treatment plan unchanged.    Antibiotics (From admission, onward)      Start     Stop Route Frequency Ordered    05/18/24 1500  piperacillin-tazobactam 3.375 g in dextrose 5 % 100 mL IVPB (ready to mix)         -- IV Every 8 hours (non-standard times) 05/18/24 1349            Microbiology Results (last 7 days)       Procedure Component Value Units Date/Time    Blood culture x two cultures. Draw prior to antibiotics. [[id number]] Collected: 05/18/24 0554    Order Status: Completed Specimen: Blood from Peripheral, Hand, Left Updated: 05/20/24 0632     Blood Culture, Routine No Growth to date      No Growth to date      No Growth to date    Narrative:      Aerobic and anaerobic  Collection has been rescheduled by [facility] at 05/18/2024 05:42 Reason:   Pt in scan/will wait  Collection has been rescheduled by [facility] at 05/18/2024 05:42 Reason:   Pt in scan/will wait    Blood culture x two cultures. Draw prior to antibiotics. [[id number]] Collected: 05/18/24 0555    Order Status: Completed Specimen: Blood from Peripheral, Hand, Right Updated: 05/20/24 0632     Blood Culture, Routine No Growth to date      No Growth to date      No Growth to date    Narrative:      Aerobic and anaerobic  Collection has been rescheduled by [facility] at 05/18/2024 05:42 Reason:   Pt in scan/will wait  Collection has been rescheduled by [facility] at 05/18/2024 05:42 Reason:   Pt in scan/will wait            Suspect aspiration   Initially started on rocephin/azithro in ED, would hold off on further azithro given Qtc prolongation  -swallow evaluation ordered  -QTC prolongation in proved to 430  -improved cough    Encephalopathy, metabolic  Multifactorial  Infection and  bradycardia likely contributing   -bradycardia improved  -patient back to baseline, mental status AAO X2        VTE Risk Mitigation (From admission, onward)           Ordered     enoxaparin injection 40 mg  Daily         05/18/24 1101     IP VTE HIGH RISK PATIENT  Once         05/18/24 1101     Place sequential compression device  Until discontinued         05/18/24 1101                    Discharge Planning   MARINA: 5/23/2024     Code Status: DNR   Is the patient medically ready for discharge?:     Reason for patient still in hospital (select all that apply): {HMREASONPATIENTINHOSP:22098}  Discharge Plan A: New Nursing Home placement - alf care facility   Discharge Delays: None known at this time        Critical care time spent on the evaluation and treatment of severe organ dysfunction, review of pertinent labs and imaging studies, discussions with consulting providers and discussions with patient/family: *** minutes.      Sg Cowan MD  Department of Hospital Medicine   [facility]

## 2024-05-21 NOTE — PROGRESS NOTES
Formerly [facility]  Department of Cardiology  Consult Note      PATIENT NAME: Elpidio Ayala  MRN: [id number]  TODAY'S DATE: 05/21/2024  ADMIT DATE: 5/18/2024                          CONSULT REQUESTED BY: Sg Cowan MD    SUBJECTIVE     PRINCIPAL PROBLEM: Sepsis    REASON FOR CONSULT:  Symptomatic bradycardia    Interval history:  05/21/2024  Patient seen this morning resting comfortably in ICU bed with brother at bedside.  Telemetry reviewed in detail and cardiologist reach out to [facility] to discuss case.  EP on-call at [facility] reviewed telemetry as well and was agreeable to discharging patient home on a live Zio monitor.  Patient will follow up with EP outpatient.    5/202/2024  Patient seen this morning resting comfortably in ICU bed.  Family at bedside; updated on plan of care, questions answered, support provided.  Patient is intermittently confused and is unable to answer questions at this time.  Telemetry reviewed in detail, patient appears to have episodes of Wenckebach, Mobitz type 1, 2-1 block.     HPI:  Mr. Moreno is an 89-year-old male with history of BPH and dementia who was brought into the ER by his brother with complaints of increasing we worsened weakness, hypotension, and confusion.  Emesis x2 at home, found to be hypotensive in ER at 90/60.  Troponins negative, .  He was initially given some fluids in the ER for the hypotension but they have since been stopped - hypotension resolved.  Echo yesterday shows ejection fraction of 50-55%.  EKG done this morning shows sinus rhythm with first-degree AV block and PACs with a rate of 67.     Her hospital medicine notes:  Mr. Ayala is an 89 year old male with a history of BPH and dementia who presents today with complaints of AMS per his brother who went to check on him a 4 am. It is moderate. It is associated with episode of vomiting and nonproductive cough. He denies fever, chills, diarrhea, or LOC. History is limited d/t  underlying dementia but is supplemented by brother who is at bedside. He lives alone and is normally oriented to person and place and somewhat situation. This morning he was only oriented to person and was slow to respond prompting his presentation. BP was soft on arrival 99/55 HR 62 satting 92% or better on room air. He was given 1L fluid bolus now with mild hypertension /72. CXR revealed: patchy interstitial opacities, WBC 13.9K, macrocytosis, lactate 2.8, procal 0.5, , highly sensitive troponin WNL, T bili 2.9 (with chronic elevation), UA concentrated but bland, CT head with no acute intracranial abnormalities, abd US Mild right-sided hydronephrosis, Sludge in the gallbladder without findings of acute cholecystitis. Initial EKG: accelerated junctional with Qtc 519. Hospital medicine is consulted for admission for sepsis.         Review of patient's allergies indicates:   Allergen Reactions    No known drug allergies        Past Medical History:   Diagnosis Date    BPH (benign prostatic hypertrophy)     Scrotal mass     Wears glasses      Past Surgical History:   Procedure Laterality Date    EYE SURGERY  BILAT CATARACT WITH LENS    PROSTATE SURGERY      TONSILLECTOMY       Social History     Tobacco Use    Smoking status: Never    Smokeless tobacco: Never   Substance Use Topics    Alcohol use: No    Drug use: No        REVIEW OF SYSTEMS    As mentioned in HPI    OBJECTIVE     VITAL SIGNS (Most Recent)  Temp: 97.7 °F (36.5 °C) (05/21/24 0701)  Pulse: (!) 49 (05/21/24 0701)  Resp: (!) 21 (05/21/24 0701)  BP: (!) 176/81 (05/21/24 0701)  SpO2: 97 % (05/21/24 1301)    VENTILATION STATUS  Resp: (!) 21 (05/21/24 0701)  SpO2: 97 % (05/21/24 1301)           I & O (Last 24H):  Intake/Output Summary (Last 24 hours) at 5/21/2024 1453  Last data filed at 5/21/2024 0329  Gross per 24 hour   Intake 200 ml   Output 575 ml   Net -375 ml       WEIGHTS  Wt Readings from Last 3 Encounters:   05/20/24 0300 82.5 kg (181 lb  14.1 oz)   05/19/24 0400 82.3 kg (181 lb 7 oz)   05/18/24 1325 81.6 kg (180 lb)   05/18/24 0513 81.6 kg (180 lb)   05/18/24 1349 81.6 kg (180 lb)   07/14/21 1550 95.3 kg (210 lb)       PHYSICAL EXAM    CONSTITUTIONAL: NAD, elderly frail male  HEENT: Normocephalic. + pallor  NECK: no JVD  LUNGS: crackles in the bases  HEART:  Sinus rhythm with first-degree AV block, S1, S2 normal, + murmur   ABDOMEN: soft,  EXTREMITIES: No edema  SKIN: No rash  NEURO:  Confused, dementia  PSYCH:  Confused    HOME MEDICATIONS:  No current facility-administered medications on file prior to encounter.     No current outpatient medications on file prior to encounter.       SCHEDULED MEDS:   cyanocobalamin  1,000 mcg Intramuscular Q30 Days    enoxparin  40 mg Subcutaneous Daily    folic acid  1 mg Oral Daily    mupirocin   Nasal BID    piperacillin-tazobactam (Zosyn) IV (PEDS and ADULTS) (extended infusion is not appropriate)  3.375 g Intravenous Q8H    senna-docusate 8.6-50 mg  1 tablet Oral BID    thiamine  100 mg Oral Daily       CONTINUOUS INFUSIONS:    PRN MEDS:  Current Facility-Administered Medications:     acetaminophen, 650 mg, Oral, Q8H PRN    acetaminophen, 650 mg, Oral, Q4H PRN    aluminum-magnesium hydroxide-simethicone, 30 mL, Oral, QID PRN    dextrose 50%, 12.5 g, Intravenous, PRN    dextrose 50%, 25 g, Intravenous, PRN    diazePAM, 2.5 mg, Intravenous, Q20 Min PRN    glucagon (human recombinant), 1 mg, Intramuscular, PRN    glucose, 16 g, Oral, PRN    glucose, 24 g, Oral, PRN    magnesium oxide, 800 mg, Oral, PRN    magnesium oxide, 800 mg, Oral, PRN    melatonin, 6 mg, Oral, Nightly PRN    naloxone, 0.02 mg, Intravenous, PRN    ondansetron, 4 mg, Intravenous, Q6H PRN    polyethylene glycol, 17 g, Oral, BID PRN    potassium bicarbonate, 35 mEq, Oral, PRN    potassium bicarbonate, 50 mEq, Oral, PRN    potassium bicarbonate, 60 mEq, Oral, PRN    sodium chloride 0.9%, 10 mL, Intravenous, Q12H PRN    LABS AND DIAGNOSTICS  "    CBC LAST 3 DAYS  Recent Labs   Lab 05/19/24  0404 05/20/24  0433 05/21/24  0310   WBC 7.66 5.98 6.01   RBC 4.05* 4.50* 4.29*   HGB 13.4* 15.0 14.5   HCT 40.9 46.0 43.8   * 102* 102*   MCH 33.1* 33.3* 33.8*   MCHC 32.8 32.6 33.1   RDW 14.7* 14.4 14.7*   * 158 163   MPV 10.5 10.4 10.5   GRAN 74.8*  5.7 72.2  4.3 59.1  3.6   LYMPH 14.2*  1.1 16.1*  1.0 27.1  1.6   MONO 9.3  0.7 8.4  0.5 8.5  0.5   BASO 0.02 0.02 0.02   NRBC 0 0 0       COAGULATION LAST 3 DAYS  Recent Labs   Lab 05/18/24  1458   INR 1.1       CHEMISTRY LAST 3 DAYS  Recent Labs   Lab 05/19/24  0404 05/20/24  0330 05/21/24  0310    143 142   K 4.4 4.0 4.4    112* 109   CO2 28 25 26   ANIONGAP 4* 6* 7*   BUN 18 12 13   CREATININE 1.0 0.8 0.9   GLU 89 87 81   CALCIUM 7.8* 8.8 8.6*   MG 2.0 2.2 2.0   ALBUMIN 3.2* 3.5 3.3*   PROT 5.2* 5.8* 5.5*   ALKPHOS 41* 46* 44*   ALT 9* 8* 9*   AST 13 15 13   BILITOT 1.5* 1.3* 1.1*       CARDIAC PROFILE LAST 3 DAYS  Recent Labs   Lab 05/18/24  0521 05/18/24  1458   *  --    TROPONINIHS 11.8 17.3*       ENDOCRINE LAST 3 DAYS  Recent Labs   Lab 05/18/24  0521 05/18/24  1458   TSH  --  0.942   PROCAL 0.514*  --        LAST ARTERIAL BLOOD GAS  ABG  No results for input(s): "PH", "PO2", "PCO2", "HCO3", "BE" in the last 168 hours.    LAST 7 DAYS MICROBIOLOGY   Microbiology Results (last 7 days)       Procedure Component Value Units Date/Time    Blood culture x two cultures. Draw prior to antibiotics. [[id number]] Collected: 05/18/24 0555    Order Status: Completed Specimen: Blood from Peripheral, Hand, Right Updated: 05/21/24 0632     Blood Culture, Routine No Growth to date      No Growth to date      No Growth to date      No Growth to date    Narrative:      Aerobic and anaerobic  Collection has been rescheduled by [facility] at 05/18/2024 05:42 Reason:   Pt in scan/will wait  Collection has been rescheduled by [facility] at 05/18/2024 05:42 Reason:   Pt in scan/will wait    Blood culture x " two cultures. Draw prior to antibiotics. [[id number]] Collected: 05/18/24 0554    Order Status: Completed Specimen: Blood from Peripheral, Hand, Left Updated: 05/21/24 0632     Blood Culture, Routine No Growth to date      No Growth to date      No Growth to date      No Growth to date    Narrative:      Aerobic and anaerobic  Collection has been rescheduled by [email or web address] at 05/18/2024 05:42 Reason:   Pt in scan/will wait  Collection has been rescheduled by [facility] at 05/18/2024 05:42 Reason:   Pt in scan/will wait            MOST RECENT IMAGING  Echo    Left Ventricle: The left ventricle is normal in size. Normal wall   thickness. Unable to assess wall motion. There is low normal systolic   function with a visually estimated ejection fraction of 50 - 55%.    Right Ventricle: Right ventricle was not well visualized due to poor   acoustic window. Systolic function is normal.    Aortic Valve: The aortic valve is a trileaflet valve. There is moderate   aortic regurgitation.    Mitral Valve: There is no stenosis. The mean pressure gradient across   the mitral valve is 3 mmHg at a heart rate of  bpm. There is mild   regurgitation.    Tricuspid Valve: There is mild regurgitation.  US Abdomen Limited  Narrative: CLINICAL HISTORY:  ([id number])89 y/o  (1935) M    Hyperbilirubinemia;    TECHNIQUE:  (A#[id number], exam time 5/18/2024 7:38)    US ABDOMEN LIMITED [id number]    Right upper quadrant ultrasound, images obtained in grayscale and color. Additional Doppler images of the portal vein were obtained.    COMPARISON:  None available.    FINDINGS:  Please note this examination is technically suboptimal due to patient related factors  including body habitus as well as overlying bowel gas.    The LIVER is normal in size and contour at 16.5 cm (sagittal right lobe). Hepatic parenchyma has normal echogenicity with normal delineation of the periportal triads. No definite intrahepatic masses are noted. The portal vein is patent with  hepatopetal flow .    The BILIARY SYSTEM is normal in caliber; the common hepatic duct measures 5 mm. There are is sludge seen in the GALL BLADDER. There is no gallbladder wall thickening, pericholecystic fluid or sonographic Strickland sign to suggest acute cholecystitis.    The PANCREATIC head is partially visualized but grossly normal in appearance. The pancreatic duct is not dilated.    The right KIDNEY is normal in size at 12.8 x 5.9 x 4.8 cm and echogenicity/texture.  There is mild right-sided hydronephrosis.  No shadowing stones, contour deforming renal mass or cyst is identified.    The AORTA and IVC are partially visualized and unremarkable.  Impression: 1.  Mild right-sided hydronephrosis.    2.  Sludge in the gallbladder without findings of acute cholecystitis.    3.  No intra/extrahepatic biliary ductal dilation.    .    Electronically signed by: Colby Guerrero  Date:    05/18/2024  Time:    07:40  X-Ray Chest AP Portable  Narrative: EXAMINATION:  XR CHEST AP PORTABLE    CLINICAL HISTORY:  Sepsis;    TECHNIQUE:  Single frontal view of the chest was performed.    COMPARISON:  Chest radiograph performed 04/16/2020, 09:06 hours.    FINDINGS:  Monitoring leads overlie the chest.  Cardiomediastinal contours grossly unchanged.  Atherosclerosis of the aorta.    Patchy interstitial predominant opacities are noted bilaterally.    No definite pneumothorax or large volume pleural effusion.    No acute findings in the visualized abdomen.    Osseous and soft tissue structures appear without definite acute change.  Impression: Patchy interstitial predominant opacities bilaterally could relate to edema versus infectious or non infectious inflammatory process.    Electronically signed by: Conor Stokes  Date:    05/18/2024  Time:    06:28  CT Head Without Contrast  Narrative: EXAMINATION:  CT HEAD WITHOUT CONTRAST    CLINICAL HISTORY:  Mental status change, unknown cause;    TECHNIQUE:  Low dose axial images were obtained  through the head.  Coronal and sagittal reformations were also performed. Contrast was not administered.    COMPARISON:  MRI brain performed 04/16/2020. CT head 04/16/2020    FINDINGS:  Blood: No acute intracranial hemorrhage.    Parenchyma: No definite loss of gray-white differentiation to suggest acute or subacute transcortical infarct. Generalized pattern age-related parenchymal volume loss.  Nonspecific areas of white matter hypoattenuation may reflect sequela of chronic small vessel ischemic disease.  Lacunar type insults involve the deep gray nuclei.    Ventricles/Extra-axial spaces: No abnormal extra-axial fluid collection. Basal cisterns are patent.    Vessels: Mild atherosclerotic calcifications.    Orbits: Status post bilateral lens replacements.    Scalp: Unremarkable.    Skull: There are no depressed skull fractures or destructive bone lesions.    Sinuses and mastoids: Scattered paranasal sinus mucosal thickening.  Near complete chronic appearing opacification of the right sphenoid sinus with osteitis of the sinus walls.  Relatively modest paranasal sinus mucosal thickening elsewhere.    Other findings: None  Impression: No definite acute intracranial findings by noncontrast CT.    Electronically signed by: Conor Stokes  Date:    05/18/2024  Time:    06:27      ECHOCARDIOGRAM RESULTS (last 5)  Results for orders placed during the hospital encounter of 05/18/24    Echo    Interpretation Summary    Left Ventricle: The left ventricle is normal in size. Normal wall thickness. Unable to assess wall motion. There is low normal systolic function with a visually estimated ejection fraction of 50 - 55%.    Right Ventricle: Right ventricle was not well visualized due to poor acoustic window. Systolic function is normal.    Aortic Valve: The aortic valve is a trileaflet valve. There is moderate aortic regurgitation.    Mitral Valve: There is no stenosis. The mean pressure gradient across the mitral valve is 3  mmHg at a heart rate of  bpm. There is mild regurgitation.    Tricuspid Valve: There is mild regurgitation.      CURRENT/PREVIOUS VISIT EKG  Results for orders placed or performed during the hospital encounter of 05/18/24   EKG 12-lead    Collection Time: 05/20/24  3:40 AM   Result Value Ref Range    QRS Duration 104 ms    OHS QTC Calculation 369 ms    Narrative    Test Reason : I49.9,    Vent. Rate : 042 BPM     Atrial Rate : 042 BPM     P-R Int : 216 ms          QRS Dur : 104 ms      QT Int : 442 ms       P-R-T Axes : 064 006 023 degrees     QTc Int : 369 ms    Marked sinus bradycardia with 1st degree A-V block with Premature atrial  complexes  Abnormal ECG  When compared with ECG of 19-MAY-2024 15:18,  Vent. rate has decreased BY  30 BPM  QT has shortened    Referred By: [email or web address]   SELF           Confirmed By:            ASSESSMENT/PLAN:     Active Hospital Problems    Diagnosis    *Sepsis    Cognitive impairment    Encephalopathy, metabolic    Pneumonia    Elevated bilirubin    BPH (benign prostatic hyperplasia)    Symptomatic bradycardia    Macrocytosis       ASSESSMENT & PLAN:     Sepsis  bradycardia  Cognitive impairment  Metabolic encephalopathy  Pneumonia  Elevated bilirubin  BPH  Macrocytosis      RECOMMENDATIONS:    Patient's telemetry appeared stable overnight with a Wenckebach, Mobitz type 1 block noted.  Ordered a 30 day lives Zio monitor to continue to monitor patient.  Altogether patient appears stable at this time and is only having these bradycardic episodes while asleep.  Reviewed case with EP specialist on-call at [facility] and they were agreeable to DC patient home at this time and follow up as an outpatient.  Referral placed.  Patient can be cleared from a cardiac standpoint to discharge home at this time.  Needs to follow up in clinic in 1-2 weeks.    Antonette Kulkarni NP  Department of Cardiology  Date of Service: 05/21/2024        I have personally interviewed and examined the patient, I  have reviewed the Nurse Practitioner's history and physical, assessment, and plan. I agree with the findings and plan.    Case discussed with electrophysiologist on-call Dr. Garnett at [facility] today.  Overnight telemetry strips reviewed.  Brief episodes of Wenckebach and very rare junctional escape rhythm on telemetry during hours of sleep.  Patient's heart rate is in the normal range when he is awake.  Patient is clinically stable and asymptomatic.  No indication for pacemaker placement at this time per EP.  Plan is to discharge patient on cardiac event monitor for further  monitoring.  Outpatient close follow up in 1-2 weeks.    PAZ LUONG M.D.  Department of Cardiology  Date of Service: 05/21/2024

## 2024-05-21 NOTE — TELEPHONE ENCOUNTER
----- Message from Yuliet Gallardo RN sent at 5/21/2024  2:44 PM CDT -----  Regarding: hsopital follow up  Please call and schedule a hospital follow up for 8-14 days from dc om 5/21    Thanks  Paulina  [phone]

## 2024-05-21 NOTE — CARE UPDATE
Cm reached out to cardiology to see if Zio monitor needed and CM was informed  by RUTHIE Kulkarni order placed as outpt, cm called scheduling to verify and order not seen, cm made NP strong aware.

## 2024-05-21 NOTE — PLAN OF CARE
05/21/24 1042   Post-Acute Status   Post-Acute Authorization Placement;Home Health   Post-Acute Placement Status Pending Bed Availability   Home Health Status Pending medical clearance/testing   Discharge Plan   Discharge Plan A Home;Home Health   Discharge Plan B Assisted Living     Cm spoke with brother Celestine who informs cm he intends to place his brother in Assisted Living at [facility] but they will not have a bed ready for 2 weeks.  Celestine is ok to take pt home with  until bed at [facility] available. Cm reached out to yaa Rosas voice message for return call, cm to assist with paperwork if able.

## 2024-05-21 NOTE — PLAN OF CARE
05/21/24 1218   Post-Acute Status   Post-Acute Authorization Home Health   Post-Acute Placement Status Pending medical clearance/testing     Pt and brother agreeable for [facility], referral sent in [facility] start of care pending discharge.      CM spoke with Alison at [facility] who will send packet for admissions via fax, cm to help complete.

## 2024-05-21 NOTE — PROGRESS NOTES
[facility] Medicine  Progress Note    Patient Name: Elpidio Ayala  MRN: [id number]  Patient Class: IP- Inpatient   Admission Date: 5/18/2024  Length of Stay: 1 days  Attending Physician: Sg Cowan MD  Primary Care Provider: Cali Win Jr., MD        Subjective:     Principal Problem:Sepsis        HPI:  Mr. Ayala is an 89 year old male with a history of BPH and dementia who presents today with complaints of AMS per his brother who went to check on him a 4 am. It is moderate. It is associated with episode of vomiting and nonproductive cough. He denies fever, chills, diarrhea, or LOC. History is limited d/t underlying dementia but is supplemented by brother who is at bedside. He lives alone and is normally oriented to person and place and somewhat situation. This morning he was only oriented to person and was slow to respond prompting his presentation. BP was soft on arrival 99/55 HR 62 satting 92% or better on room air. He was given 1L fluid bolus now with mild hypertension /72. CXR revealed: patchy interstitial opacities, WBC 13.9K, macrocytosis, lactate 2.8, procal 0.5, , highly sensitive troponin WNL, T bili 2.9 (with chronic elevation), UA concentrated but bland, CT head with no acute intracranial abnormalities, abd US Mild right-sided hydronephrosis, Sludge in the gallbladder without findings of acute cholecystitis. Initial EKG: accelerated junctional with Qtc 519. Hospital medicine is consulted for admission for sepsis.     Overview/Hospital Course:  Patient was admitted to intensive care unit where patient was closely monitored.  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  Cardiology team closely followed and discussed the case with [facility] for consideration for pacemaker placement.  No pacemaker or cardiac intervention was recommended.  Acute kidney injury resolved with IV fluid hydration.  Patient noted to have vitamin B12  deficiency for which patient was started on supplementation.  Microbiology results closely followed.  Patient remained afebrile.  Patient was maintained on antibiotic therapy.  Patient noted to have extensive progressive dementia with short-term memory problem.  Patient's brother confirmed DNR code status and requested long term nursing home placement since patient lives alone and not able to take care of himself anymore.   consulted.    Interval History:  Patient seen and examined.  No new complaints.  No further bradycardia, lightheadedness, near syncope symptoms noted.  Brother confirms DNR code status and planning to take patient to assisted living facility.    Review of Systems   Constitutional:  Negative for activity change, chills and fever.   HENT:  Negative for congestion, rhinorrhea and sore throat.    Eyes:  Negative for discharge and redness.   Respiratory:  Negative for cough, chest tightness, shortness of breath and wheezing.    Cardiovascular:  Negative for chest pain, palpitations and leg swelling.   Gastrointestinal:  Negative for abdominal pain, constipation, diarrhea, nausea and vomiting.   Genitourinary:  Negative for dysuria, flank pain and hematuria.   Musculoskeletal:  Negative for back pain, myalgias and neck pain.   Skin:  Negative for pallor, rash and wound.   Neurological:  Negative for dizziness, tremors, syncope, weakness, numbness and headaches.   Psychiatric/Behavioral:  Negative for agitation, confusion and hallucinations.      Objective:     Vital Signs (Most Recent):  Temp: 97.7 °F (36.5 °C) (05/21/24 0701)  Pulse: (!) 49 (05/21/24 0701)  Resp: (!) 21 (05/21/24 0701)  BP: (!) 176/81 (05/21/24 0701)  SpO2: 97 % (05/21/24 0701) Vital Signs (24h Range):  Temp:  [97.7 °F (36.5 °C)-98.9 °F (37.2 °C)] 97.7 °F (36.5 °C)  Pulse:  [49-94] 49  Resp:  [15-34] 21  SpO2:  [92 %-98 %] 97 %  BP: (110-176)/(56-84) 176/81     Weight: 82.5 kg (181 lb 14.1 oz)  Body mass index is 24  kg/m².    Intake/Output Summary (Last 24 hours) at 5/21/2024 0844  Last data filed at 5/21/2024 0329  Gross per 24 hour   Intake 200 ml   Output 875 ml   Net -675 ml         Physical Exam  Vitals and nursing note reviewed.   Constitutional:       General: He is not in acute distress.  HENT:      Head: Normocephalic and atraumatic.      Mouth/Throat:      Mouth: Mucous membranes are moist.   Eyes:      General:         Right eye: No discharge.         Left eye: No discharge.      Conjunctiva/sclera: Conjunctivae normal.   Cardiovascular:      Rate and Rhythm: Normal rate and regular rhythm.      Pulses: Normal pulses.   Pulmonary:      Effort: Pulmonary effort is normal.      Breath sounds: Normal breath sounds.   Abdominal:      General: Bowel sounds are normal.      Palpations: Abdomen is soft.   Musculoskeletal:         General: No swelling. Normal range of motion.      Cervical back: Normal range of motion and neck supple.   Skin:     General: Skin is warm and dry.   Neurological:      Mental Status: He is alert. Mental status is at baseline.      Comments: Oriented X2   Psychiatric:         Mood and Affect: Mood normal.             Significant Labs:  Lab Results   Component Value Date    WBC 6.01 05/21/2024    HGB 14.5 05/21/2024    HCT 43.8 05/21/2024     (H) 05/21/2024     05/21/2024       CMP  Sodium   Date Value Ref Range Status   05/21/2024 142 136 - 145 mmol/L Final     Potassium   Date Value Ref Range Status   05/21/2024 4.4 3.5 - 5.1 mmol/L Final     Chloride   Date Value Ref Range Status   05/21/2024 109 95 - 110 mmol/L Final     CO2   Date Value Ref Range Status   05/21/2024 26 23 - 29 mmol/L Final     Glucose   Date Value Ref Range Status   05/21/2024 81 70 - 110 mg/dL Final     BUN   Date Value Ref Range Status   05/21/2024 13 8 - 23 mg/dL Final     Creatinine   Date Value Ref Range Status   05/21/2024 0.9 0.5 - 1.4 mg/dL Final   07/12/2012 0.7 0.2 - 1.4 mg/dl Final     Calcium   Date  Value Ref Range Status   05/21/2024 8.6 (L) 8.7 - 10.5 mg/dL Final   07/12/2012 9.7 8.6 - 10.2 mg/dl Final     Total Protein   Date Value Ref Range Status   05/21/2024 5.5 (L) 6.0 - 8.4 g/dL Final     Albumin   Date Value Ref Range Status   05/21/2024 3.3 (L) 3.5 - 5.2 g/dL Final     Total Bilirubin   Date Value Ref Range Status   05/21/2024 1.1 (H) 0.1 - 1.0 mg/dL Final     Comment:     For infants and newborns, interpretation of results should be based  on gestational age, weight and in agreement with clinical  observations.    Premature Infant recommended reference ranges:  Up to 24 hours.............<8.0 mg/dL  Up to 48 hours............<12.0 mg/dL  3-5 days..................<15.0 mg/dL  6-29 days.................<15.0 mg/dL       Alkaline Phosphatase   Date Value Ref Range Status   05/21/2024 44 (L) 55 - 135 U/L Final     AST   Date Value Ref Range Status   05/21/2024 13 10 - 40 U/L Final     ALT   Date Value Ref Range Status   05/21/2024 9 (L) 10 - 44 U/L Final     Anion Gap   Date Value Ref Range Status   05/21/2024 7 (L) 8 - 16 mmol/L Final   07/12/2012 7 5 - 15 meq/L Final     eGFR   Date Value Ref Range Status   05/21/2024 >60.0 >60 mL/min/1.73 m^2 Final     Microbiology Results (last 7 days)       Procedure Component Value Units Date/Time    Blood culture x two cultures. Draw prior to antibiotics. [[id number]] Collected: 05/18/24 0562    Order Status: Completed Specimen: Blood from Peripheral, Hand, Right Updated: 05/21/24 0632     Blood Culture, Routine No Growth to date      No Growth to date      No Growth to date      No Growth to date    Narrative:      Aerobic and anaerobic  Collection has been rescheduled by SAIGE at 05/18/2024 05:42 Reason:   Pt in scan/will wait  Collection has been rescheduled by [facility] at 05/18/2024 05:42 Reason:   Pt in scan/will wait    Blood culture x two cultures. Draw prior to antibiotics. [[id number]] Collected: 05/18/24 0554    Order Status: Completed Specimen: Blood from  Peripheral, Hand, Left Updated: 05/21/24 0632     Blood Culture, Routine No Growth to date      No Growth to date      No Growth to date      No Growth to date    Narrative:      Aerobic and anaerobic  Collection has been rescheduled by [facility] at 05/18/2024 05:42 Reason:   Pt in scan/will wait  Collection has been rescheduled by B1 at 05/18/2024 05:42 Reason:   Pt in scan/will wait          Significant Imaging:  ECHO:    Left Ventricle: The left ventricle is normal in size. Normal wall thickness. Unable to assess wall motion. There is low normal systolic function with a visually estimated ejection fraction of 50 - 55%.    Right Ventricle: Right ventricle was not well visualized due to poor acoustic window. Systolic function is normal.    Aortic Valve: The aortic valve is a trileaflet valve. There is moderate aortic regurgitation.    Mitral Valve: There is no stenosis. The mean pressure gradient across the mitral valve is 3 mmHg at a heart rate of  bpm. There is mild regurgitation.    Tricuspid Valve: There is mild regurgitation.    CXR:  Patchy interstitial predominant opacities bilaterally could relate to edema versus infectious or non infectious inflammatory process.     CT Head without contrast:  No definite acute intracranial findings by noncontrast CT.     RUQ abdominal US:  1.  Mild right-sided hydronephrosis.  2.  Sludge in the gallbladder without findings of acute cholecystitis.  3.  No intra/extrahepatic biliary ductal dilation.    Assessment/Plan:      * Sepsis  Continue telemonitoring.   This patient does have evidence of infective focus  My overall impression is sepsis.  Source: Respiratory  Antibiotics given-   Antibiotics (72h ago, onward)      Start     Stop Route Frequency Ordered    05/18/24 1500  piperacillin-tazobactam 3.375 g in dextrose 5 % 100 mL IVPB (ready to mix)         -- IV Every 8 hours (non-standard times) 05/18/24 1349          Latest lactate reviewed-  Recent Labs   Lab 05/20/24  0911    LACTATE 1.2       Organ dysfunction indicated by Acute kidney injury    Fluid challenge: received 1L bolus, MAP never dropped below 65, getting LR at 130mL for now     Post- resuscitation assessment No - Post resuscitation assessment not needed       Will Not start Pressors- Levophed for MAP of 65  Source control achieved by: abx    -continue with Zosyn  -low probability of UTI  -blood cultures pending  -vitals within normal limits.    -Lactic acid 1.5  -source possibly due to aspiration pneumonia.  -we will order swallow evaluation, to rule out aspiration    Cognitive impairment  -diagnosis, per patient's brother  -alert and oriented x2 at baseline  -patient currently needing sitter for safety  -vitamin B12 low, 199.  Replete and monitor      Macrocytosis  Vitamin B12 deficiency - start supplementation. Thiamine and folic acid wnl  Ethanol levels <10    Dementia with behavioral disturbance  Symptomatic bradycardia  Pt arrived to floor and HR noted in the 40s, now 63  Repeat EKG Second degree AV block Mobitz I HR 50s  -Cards following.  No pacemaker needed.  Outpatient 30 day Zio monitor is being arranged.  -TSH wnl  -Echo results reviewed  -telemetry monitor  -maintain pacer pads.    -Avoid beta-blockers      BPH (benign prostatic hyperplasia)  Mild right sided hydronephrosis likely d/t this  Urine is bland without signs of infection         Elevated bilirubin  Present over the last few years, but a little worse which may be d/t mild dehydration  Gilbert's?  Abd US with CBD WNL  -Does have macrocytosis - alcohol?  Patient and brother denied history of alcoholism  -Ethanol levels <10  -PT/INR wnl  -Trend CMP  -negative for Strickland sign        Pneumonia  Patient has a diagnosis of pneumonia. The cause of the pneumonia is suspected to be bacterial in etiology but organism is not known. The pneumonia is stable. The patient has the following signs/symptoms of pneumonia: cough. The patient does not have a current oxygen  requirement and the patient does not have a home oxygen requirement. I have reviewed the pertinent imaging. The following cultures have been collected: Blood cultures The culture results are listed below.     Current antimicrobial regimen consists of the antibiotics listed below. Will monitor patient closely and continue current treatment plan unchanged.    Antibiotics (From admission, onward)      Start     Stop Route Frequency Ordered    05/18/24 1500  piperacillin-tazobactam 3.375 g in dextrose 5 % 100 mL IVPB (ready to mix)         -- IV Every 8 hours (non-standard times) 05/18/24 1349            Microbiology Results (last 7 days)       Procedure Component Value Units Date/Time    Blood culture x two cultures. Draw prior to antibiotics. [[id number]] Collected: 05/18/24 0554    Order Status: Completed Specimen: Blood from Peripheral, Hand, Left Updated: 05/20/24 0632     Blood Culture, Routine No Growth to date      No Growth to date      No Growth to date    Narrative:      Aerobic and anaerobic  Collection has been rescheduled by [facility] at 05/18/2024 05:42 Reason:   Pt in scan/will wait  Collection has been rescheduled by [facility] at 05/18/2024 05:42 Reason:   Pt in scan/will wait    Blood culture x two cultures. Draw prior to antibiotics. [[id number]] Collected: 05/18/24 0555    Order Status: Completed Specimen: Blood from Peripheral, Hand, Right Updated: 05/20/24 0632     Blood Culture, Routine No Growth to date      No Growth to date      No Growth to date    Narrative:      Aerobic and anaerobic  Collection has been rescheduled by [facility] at 05/18/2024 05:42 Reason:   Pt in scan/will wait  Collection has been rescheduled by [facility] at 05/18/2024 05:42 Reason:   Pt in scan/will wait            Suspect aspiration   Initially started on rocephin/azithro in ED, would hold off on further azithro given Qtc prolongation  -swallow evaluation ordered  -QTC prolongation in proved to 430  -improved cough    Encephalopathy,  metabolic  Multifactorial  Infection and bradycardia likely contributing   -bradycardia improved  -patient back to baseline, mental status AAO X2      Discussed with patient's brother,  regarding discharge planning.  Answered all questions.  VTE Risk Mitigation (From admission, onward)           Ordered     enoxaparin injection 40 mg  Daily         05/18/24 1101     IP VTE HIGH RISK PATIENT  Once         05/18/24 1101     Place sequential compression device  Until discontinued         05/18/24 1101                    Discharge Planning   MARINA: 5/22/2024     Code Status: DNR   Is the patient medically ready for discharge?:     Reason for patient still in hospital (select all that apply): Patient trending condition and Consult recommendations  Discharge Plan A: Home, Home Health   Discharge Delays: None known at this time        Critical care time spent on the evaluation and treatment of severe organ dysfunction, review of pertinent labs and imaging studies, discussions with consulting providers and discussions with patient/family: 35 minutes.      Sg Cowan MD  Department of Hospital Medicine   [facility]

## 2024-05-21 NOTE — PLAN OF CARE
05/21/24 1448   Final Note   Assessment Type Final Discharge Note   Anticipated Discharge Disposition Home-Health   What phone number can be called within the next 1-3 days to see how you are doing after discharge? [phone]   Hospital Resources/Appts/Education Provided Appointment suggestion unavailable   Post-Acute Status   Post-Acute Authorization Home Health   Post-Acute Placement Status Pending Bed Availability   Home Health Status Set-up Complete/Auth obtained   Discharge Delays None known at this time     pt to discharge home with [facility] and will be seen 5/22/2024.  Pt to move to [facility] once bed available.  Patient cleared for discharge from case management standpoint.    Follow up appointments requested with in-basket message.    Chart and discharge orders reviewed.  Patient discharged home with no further case management needs.

## 2024-05-21 NOTE — ASSESSMENT & PLAN NOTE
Pt arrived to floor and HR noted in the 40s, now 63  Repeat EKG Second degree AV block Mobitz I HR 50s  -Cards following.  No pacemaker needed.  Outpatient 30 day Zio monitor is being arranged.  -TSH wnl  -Echo results reviewed  -telemetry monitor  -maintain pacer pads.    -Avoid beta-blockers

## 2024-05-21 NOTE — CARE UPDATE
05/21/24 0815   Patient Assessment/Suction   Level of Consciousness (AVPU) alert   Respiratory Effort Unlabored   Expansion/Accessory Muscles/Retractions no use of accessory muscles   Rhythm/Pattern, Respiratory pattern regular;depth regular   Cough Frequency no cough   PRE-TX-O2   Device (Oxygen Therapy) room air   SpO2 95 %   Pulse Oximetry Type Continuous   $ Pulse Oximetry - Multiple Charge Pulse Oximetry - Multiple   Pulse 69   Resp (!) 22   Education   $ Education DME Oxygen;15 min

## 2024-05-21 NOTE — PLAN OF CARE
Problem: Adult Inpatient Plan of Care  Goal: Plan of Care Review  Outcome: Progressing  Flowsheets (Taken 5/21/2024 0338)  Plan of Care Reviewed With: patient  Goal: Patient-Specific Goal (Individualized)  Outcome: Progressing  Flowsheets (Taken 5/21/2024 0338)  Individualized Care Needs: frequent re-orientation & close obsevation.  Anxieties, Fears or Concerns: pt forgetful. frequent re-orientation needed.  Patient/Family-Specific Goals (Include Timeframe): none     Problem: Skin Injury Risk Increased  Goal: Skin Health and Integrity  Outcome: Progressing  Intervention: Optimize Skin Protection  Flowsheets (Taken 5/21/2024 0338)  Pressure Reduction Techniques: frequent weight shift encouraged  Pressure Reduction Devices:   specialty bed utilized   pressure-redistributing mattress utilized  Skin Protection: incontinence pads utilized  Activity Management:   Arm raise - L1   Leg kicks - L2   Rolling - L1  Head of Bed (HOB) Positioning: HOB at 30-45 degrees     Problem: Sepsis/Septic Shock  Goal: Absence of Infection Signs and Symptoms  Outcome: Progressing  Intervention: Promote Stabilization  Flowsheets (Taken 5/21/2024 0338)  Fever Reduction/Comfort Measures:   lightweight bedding   lightweight clothing     Problem: Pneumonia  Goal: Effective Oxygenation and Ventilation  Outcome: Progressing  Intervention: Optimize Oxygenation and Ventilation  Flowsheets (Taken 5/21/2024 0338)  Airway/Ventilation Management: airway patency maintained  Head of Bed (HOB) Positioning: HOB at 30-45 degrees     Problem: Fall Injury Risk  Goal: Absence of Fall and Fall-Related Injury  Outcome: Progressing  Intervention: Identify and Manage Contributors  Flowsheets (Taken 5/21/2024 0338)  Self-Care Promotion:   independence encouraged   BADL personal objects within reach  Medication Review/Management: medications reviewed

## 2024-05-23 ENCOUNTER — TELEPHONE (OUTPATIENT)
Dept: PRIMARY CARE CLINIC | Facility: CLINIC | Age: 89
End: 2024-05-23
Payer: MEDICARE

## 2024-05-23 LAB
BACTERIA BLD CULT: NORMAL
BACTERIA BLD CULT: NORMAL

## 2024-05-24 NOTE — NURSING
"PICC Line Insertion Procedure Note  Pt. Name: Gladys Ramirez  MRN:        [id number]    Procedure: Insertion of a  single Lumen  4 fr  Bard SOLO (valved) Power PICC, Lot number [id number]    Indications: Antibiotic    Contraindications : Left Pacer 4/8/2024    Procedure Details     Patient identified with 2 identifiers and \"Time Out\" conducted.  .     Central line insertion bundle followed: hand hygiene performed prior to procedure, site cleansed with cholraprep, hat, mask, sterile gloves, sterile gown worn, patient draped with maximum barrier head to toe drape, sterile field maintained.    The vein was assessed and found to be compressible and of adequate size.     Lidocaine 1% 2 ml administered sq to the insertion site. A 4 Fr PICC was inserted into the basilic vein of the rightarm with ultrasound guidance. 1 attempt(s) required to access vein.   Catheter threaded without difficulty. Good blood return noted.    Modified Seldinger Technique used for insertion.    The 8 sharps that are included in the PICC insertion kit were accounted for and disposed of in the sharps container prior to breakdown of the sterile field.    Catheter secured with Statlock, biopatch and Tegaderm dressing applied.    Findings:    Total catheter length  38 cm, with 0 cm exposed. Mid upper arm circumference is 30 cm. Catheter was flushed with 20 cc NS. Patient  tolerated procedure well.    Tip placement verified by xray. Xray read by Dr. Harrington . Tip placement in the Low SVC.    CLABSI prevention brochure left at bedside.    Patient's primary RN notified PICC is ready for use.      Comments:        Nic Marie PICC RN  [facility]      " Spoke to Dr. Pascual and updated md on pt.'s overall status. See new medication orders in epic. Repositioned pt. Sitter present in room. Call light within reach and bed alarm on middle setting. Will continue current plan of care.

## 2024-05-29 ENCOUNTER — HOSPITAL ENCOUNTER (OUTPATIENT)
Dept: CARDIOLOGY | Facility: CLINIC | Age: 89
Discharge: HOME | End: 2024-05-29
Payer: MEDICARE

## 2024-05-29 DIAGNOSIS — R55 SYNCOPE AND COLLAPSE: ICD-10-CM

## 2024-05-29 DIAGNOSIS — I45.9 HEART BLOCK: ICD-10-CM

## 2024-05-29 PROCEDURE — 93228 REMOTE 30 DAY ECG REV/REPORT: CPT | Mod: ,,, | Performed by: INTERNAL MEDICINE

## 2024-05-30 ENCOUNTER — TELEPHONE (OUTPATIENT)
Dept: CARDIOLOGY | Facility: CLINIC | Age: 89
End: 2024-05-30
Payer: MEDICARE

## 2024-05-30 NOTE — TELEPHONE ENCOUNTER
----- Message from Lv Isabel sent at 5/30/2024  8:35 AM CDT -----  Regarding: [employer]  Contact: [employer]  Type:  Patient Returning Call    Who Called:Alberto with [employer]  Who Left Message for Patient:office nurse  Does the patient know what this is regarding?:monitor has fallen off/ status check the device is no longer working/ due to this patient needs to return the device/ How do MD want to handle do MD want to order another device?  Would the patient rather a call back or a response via [facility]? Please advise  Best Call Back Number:[phone]   Additional Information: ref# [id number]

## 2024-05-30 NOTE — TELEPHONE ENCOUNTER
Jenelle tried to call patient. Patient didn't answer. Patient did come in office. Jenelle is changing patch now. Patient sleeps on floor and it came off.

## 2024-06-03 ENCOUNTER — TELEPHONE (OUTPATIENT)
Dept: CARDIOLOGY | Facility: CLINIC | Age: 89
End: 2024-06-03
Payer: MEDICARE

## 2024-06-03 NOTE — TELEPHONE ENCOUNTER
----- Message from No Zulaylis sent at 6/3/2024  9:46 AM CDT -----  Contact: Hailey w/ [employer]  Hailey is req a call back she has two different orders for the event monitor so she needs to confirm the serial number off of the one rec;d.  Call back at [phone] ref #[id number] And thanks

## 2024-06-03 NOTE — TELEPHONE ENCOUNTER
Spoke with [employer] Maame due to abnormal rhythm.   Patient had multiple strips of complete heart block and 2nd degree block. Patient heart blocks started 6/1 at 11:42am and most recent 6/2 10:55am.

## 2024-06-03 NOTE — TELEPHONE ENCOUNTER
Spoke with the patient's brother, Celestine.  Received a Zio report from [employer].  The patient demonstrated 2:1 heart block.  I showed the strips to Dr Jimenez and he advised to bring him in on Wednesday for evaluation of pacemaker placement.  I called and spoke with Celestine and he will bring him in.

## 2024-06-03 NOTE — TELEPHONE ENCOUNTER
06/03/24    Received the Zio strips on patient which showed heart block.  I checked with Dr Jimenez and he advised that the patient come in on Wednesday to see him, to set up for a possible pacemaker.

## 2024-06-05 ENCOUNTER — OFFICE VISIT (OUTPATIENT)
Dept: CARDIOLOGY | Facility: CLINIC | Age: 89
End: 2024-06-05
Payer: MEDICARE

## 2024-06-05 ENCOUNTER — TELEPHONE (OUTPATIENT)
Dept: CARDIOLOGY | Facility: CLINIC | Age: 89
End: 2024-06-05
Payer: MEDICARE

## 2024-06-05 VITALS
SYSTOLIC BLOOD PRESSURE: 122 MMHG | HEIGHT: 73 IN | DIASTOLIC BLOOD PRESSURE: 60 MMHG | HEART RATE: 67 BPM | BODY MASS INDEX: 24.81 KG/M2 | OXYGEN SATURATION: 96 % | WEIGHT: 187.19 LBS

## 2024-06-05 DIAGNOSIS — R94.5 ABNORMAL RESULTS OF LIVER FUNCTION STUDIES: ICD-10-CM

## 2024-06-05 DIAGNOSIS — I44.1 MOBITZ TYPE 2 SECOND DEGREE AV BLOCK: ICD-10-CM

## 2024-06-05 DIAGNOSIS — R41.89 COGNITIVE IMPAIRMENT: ICD-10-CM

## 2024-06-05 DIAGNOSIS — I44.2 COMPLETE AV BLOCK: Primary | ICD-10-CM

## 2024-06-05 DIAGNOSIS — R79.1 ABNORMAL COAGULATION PROFILE: ICD-10-CM

## 2024-06-05 PROCEDURE — 99213 OFFICE O/P EST LOW 20 MIN: CPT | Mod: PBBFAC,PN | Performed by: INTERNAL MEDICINE

## 2024-06-05 PROCEDURE — 99999 PR PBB SHADOW E&M-EST. PATIENT-LVL III: CPT | Mod: PBBFAC,,, | Performed by: INTERNAL MEDICINE

## 2024-06-05 PROCEDURE — 99215 OFFICE O/P EST HI 40 MIN: CPT | Mod: S$PBB,,, | Performed by: INTERNAL MEDICINE

## 2024-06-05 NOTE — PROGRESS NOTES
"     Patient ID:  Elpidio Ayala is a 89 y.o. male who presents for follow-up of Hospital Follow Up and Results (zio)      1. Sepsis  2. bradycardia  3. Cognitive impairment  4. Metabolic encephalopathy  5. Pneumonia  6. Elevated bilirubin  7. BPH  8. Macrocytosis  9.         Second-degree AV block    The patient was recently admitted to the hospital on 05/21/2024 during his hospitalization he was noted to have second-degree AV block.  He was discharged home on a Zio monitor.  The Zio monitor revealed second-degree AV block.  The patient has symptoms of dizziness weakness at time.  The findings were discussed with the patient and his brother that is caregiver.  He needs a pacemaker the risks benefits indications for pacemaker has been discussed in detail with the patient and he has agreed to proceed.        Past Medical History:   Diagnosis Date    BPH (benign prostatic hypertrophy)     Bradycardia     Dementia     Mobitz (type) I (Wenckebach's) atrioventricular block     Poor historian     Scrotal mass     Wears glasses         Past Surgical History:   Procedure Laterality Date    EYE SURGERY  BILAT CATARACT WITH LENS    PROSTATE SURGERY  2009    TONSILLECTOMY            No current outpatient medications       Review of patient's allergies indicates:   Allergen Reactions    No known drug allergies         Review of Systems   Cardiovascular:  Negative for chest pain and dyspnea on exertion.   Respiratory:  Negative for cough and shortness of breath.    Neurological:  Positive for dizziness and light-headedness.        Objective:     Vitals:    06/05/24 1458   BP: 122/60   BP Location: Left arm   Patient Position: Sitting   BP Method: Medium (Manual)   Pulse: 67   SpO2: 96%   Weight: 84.9 kg (187 lb 2.7 oz)   Height: 6' 1" (1.854 m)       Physical Exam  Vitals and nursing note reviewed.   Constitutional:       Appearance: He is well-developed.   HENT:      Head: Normocephalic and atraumatic.   Eyes:      " Conjunctiva/sclera: Conjunctivae normal.   Cardiovascular:      Rate and Rhythm: Normal rate and regular rhythm.      Heart sounds: Normal heart sounds.   Pulmonary:      Effort: Pulmonary effort is normal.      Breath sounds: Normal breath sounds.   Abdominal:      General: Bowel sounds are normal.      Palpations: Abdomen is soft.   Musculoskeletal:         General: Normal range of motion.   Skin:     General: Skin is warm and dry.   Neurological:      Mental Status: He is alert and oriented to person, place, and time.   Psychiatric:         Behavior: Behavior normal.         Thought Content: Thought content normal.         Judgment: Judgment normal.       CMP  Sodium   Date Value Ref Range Status   06/11/2024 142 136 - 145 mmol/L Final     Potassium   Date Value Ref Range Status   06/11/2024 4.1 3.5 - 5.1 mmol/L Final     Chloride   Date Value Ref Range Status   06/11/2024 109 95 - 110 mmol/L Final     CO2   Date Value Ref Range Status   06/11/2024 27 23 - 29 mmol/L Final     Glucose   Date Value Ref Range Status   06/11/2024 87 70 - 110 mg/dL Final     BUN   Date Value Ref Range Status   06/11/2024 21 8 - 23 mg/dL Final     Creatinine   Date Value Ref Range Status   06/11/2024 1.0 0.5 - 1.4 mg/dL Final   07/12/2012 0.7 0.2 - 1.4 mg/dl Final     Calcium   Date Value Ref Range Status   06/11/2024 8.9 8.7 - 10.5 mg/dL Final   07/12/2012 9.7 8.6 - 10.2 mg/dl Final     Total Protein   Date Value Ref Range Status   05/21/2024 5.5 (L) 6.0 - 8.4 g/dL Final     Albumin   Date Value Ref Range Status   05/21/2024 3.3 (L) 3.5 - 5.2 g/dL Final     Total Bilirubin   Date Value Ref Range Status   05/21/2024 1.1 (H) 0.1 - 1.0 mg/dL Final     Comment:     For infants and newborns, interpretation of results should be based  on gestational age, weight and in agreement with clinical  observations.    Premature Infant recommended reference ranges:  Up to 24 hours.............<8.0 mg/dL  Up to 48 hours............<12.0 mg/dL  3-5  "days..................<15.0 mg/dL  6-29 days.................<15.0 mg/dL       Alkaline Phosphatase   Date Value Ref Range Status   05/21/2024 44 (L) 55 - 135 U/L Final     AST   Date Value Ref Range Status   05/21/2024 13 10 - 40 U/L Final     ALT   Date Value Ref Range Status   05/21/2024 9 (L) 10 - 44 U/L Final     Anion Gap   Date Value Ref Range Status   06/11/2024 6 (L) 8 - 16 mmol/L Final   07/12/2012 7 5 - 15 meq/L Final     eGFR if    Date Value Ref Range Status   07/14/2021 >60.0 >60 mL/min/1.73 m^2 Final     eGFR if non    Date Value Ref Range Status   07/14/2021 >60.0 >60 mL/min/1.73 m^2 Final     Comment:     Calculation used to obtain the estimated glomerular filtration  rate (eGFR) is the CKD-EPI equation.         BMP  Lab Results   Component Value Date     06/11/2024    K 4.1 06/11/2024     06/11/2024    CO2 27 06/11/2024    BUN 21 06/11/2024    CREATININE 1.0 06/11/2024    CALCIUM 8.9 06/11/2024    ANIONGAP 6 (L) 06/11/2024    ESTGFRAFRICA >60.0 07/14/2021    EGFRNONAA >60.0 07/14/2021      BNP  @LABRCNTIP(BNP,BNPTRIAGEBLO)@   No results found for: "CHOL"  No results found for: "HDL"  No results found for: "LDLCALC"  No results found for: "TRIG"  No results found for: "CHOLHDL"   Lab Results   Component Value Date    TSH 0.942 05/18/2024     No results found for: "LABA1C", "HGBA1C"  Lab Results   Component Value Date    WBC 5.72 06/11/2024    HGB 14.2 06/11/2024    HCT 43.6 06/11/2024     (H) 06/11/2024     06/11/2024         Results for orders placed during the hospital encounter of 05/18/24    Echo    Interpretation Summary    Left Ventricle: The left ventricle is normal in size. Normal wall thickness. Unable to assess wall motion. There is low normal systolic function with a visually estimated ejection fraction of 50 - 55%.    Right Ventricle: Right ventricle was not well visualized due to poor acoustic window. Systolic function is " normal.    Aortic Valve: The aortic valve is a trileaflet valve. There is moderate aortic regurgitation.    Mitral Valve: There is no stenosis. The mean pressure gradient across the mitral valve is 3 mmHg at a heart rate of  bpm. There is mild regurgitation.    Tricuspid Valve: There is mild regurgitation.     No results found for this or any previous visit.     EKG  Results for orders placed or performed during the hospital encounter of 06/11/24   EKG 12-lead    Collection Time: 06/11/24  7:15 AM   Result Value Ref Range    QRS Duration 90 ms    OHS QTC Calculation 431 ms    Narrative    Test Reason : I44.2,    Vent. Rate : 066 BPM     Atrial Rate : 066 BPM     P-R Int : 464 ms          QRS Dur : 090 ms      QT Int : 412 ms       P-R-T Axes : 097 057 070 degrees     QTc Int : 431 ms    Sinus rhythm with 1st degree A-V block  Otherwise normal ECG  When compared with ECG of 20-MAY-2024 03:40,  MD interval has increased  Vent. rate has increased BY  24 BPM  Nonspecific T wave abnormality no longer evident in Inferior leads  QT has lengthened  Confirmed by Gracie KIMBROUGH, Conor STRONG ([id number]) on 6/11/2024 8:56:33 PM    Referred By:             Confirmed By:Conor Bowman MD      Stress  No results found for this or any previous visit.             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       Plan:       Pacemaker placement the procedure risks benefits have been explained in detail to the patient and his brother and both are in agreement to proceed.

## 2024-06-08 LAB
OHS QRS DURATION: 94 MS
OHS QTC CALCULATION: 448 MS

## 2024-06-11 ENCOUNTER — TELEPHONE (OUTPATIENT)
Dept: CARDIOLOGY | Facility: CLINIC | Age: 89
End: 2024-06-11
Payer: MEDICARE

## 2024-06-11 ENCOUNTER — HOSPITAL ENCOUNTER (OUTPATIENT)
Dept: PREADMISSION TESTING | Facility: HOSPITAL | Age: 89
Discharge: HOME | End: 2024-06-11
Attending: INTERNAL MEDICINE
Payer: MEDICARE

## 2024-06-11 VITALS
SYSTOLIC BLOOD PRESSURE: 129 MMHG | TEMPERATURE: 98 F | HEART RATE: 67 BPM | DIASTOLIC BLOOD PRESSURE: 76 MMHG | HEIGHT: 72 IN | RESPIRATION RATE: 18 BRPM | OXYGEN SATURATION: 96 % | WEIGHT: 187 LBS | BODY MASS INDEX: 25.33 KG/M2

## 2024-06-11 DIAGNOSIS — Z01.818 PRE-OP TESTING: Primary | ICD-10-CM

## 2024-06-11 DIAGNOSIS — R79.1 ABNORMAL COAGULATION PROFILE: ICD-10-CM

## 2024-06-11 DIAGNOSIS — I44.2 COMPLETE AV BLOCK: ICD-10-CM

## 2024-06-11 DIAGNOSIS — R94.5 ABNORMAL RESULTS OF LIVER FUNCTION STUDIES: ICD-10-CM

## 2024-06-11 DIAGNOSIS — I44.2 COMPLETE HEART BLOCK: ICD-10-CM

## 2024-06-11 LAB
ANION GAP SERPL CALC-SCNC: 6 MMOL/L (ref 8–16)
APTT PPP: 26.3 SEC (ref 21–32)
BASOPHILS # BLD AUTO: 0.03 K/UL (ref 0–0.2)
BASOPHILS NFR BLD: 0.5 % (ref 0–1.9)
BUN SERPL-MCNC: 21 MG/DL (ref 8–23)
CALCIUM SERPL-MCNC: 8.9 MG/DL (ref 8.7–10.5)
CHLORIDE SERPL-SCNC: 109 MMOL/L (ref 95–110)
CO2 SERPL-SCNC: 27 MMOL/L (ref 23–29)
CREAT SERPL-MCNC: 1 MG/DL (ref 0.5–1.4)
DIFFERENTIAL METHOD BLD: ABNORMAL
EOSINOPHIL # BLD AUTO: 0.1 K/UL (ref 0–0.5)
EOSINOPHIL NFR BLD: 2.4 % (ref 0–8)
ERYTHROCYTE [DISTWIDTH] IN BLOOD BY AUTOMATED COUNT: 14.5 % (ref 11.5–14.5)
EST. GFR  (NO RACE VARIABLE): >60 ML/MIN/1.73 M^2
GLUCOSE SERPL-MCNC: 87 MG/DL (ref 70–110)
HCT VFR BLD AUTO: 43.6 % (ref 40–54)
HGB BLD-MCNC: 14.2 G/DL (ref 14–18)
IMM GRANULOCYTES # BLD AUTO: 0.03 K/UL (ref 0–0.04)
IMM GRANULOCYTES NFR BLD AUTO: 0.5 % (ref 0–0.5)
INR PPP: 1 (ref 0.8–1.2)
LYMPHOCYTES # BLD AUTO: 1.6 K/UL (ref 1–4.8)
LYMPHOCYTES NFR BLD: 28.7 % (ref 18–48)
MCH RBC QN AUTO: 33.4 PG (ref 27–31)
MCHC RBC AUTO-ENTMCNC: 32.6 G/DL (ref 32–36)
MCV RBC AUTO: 103 FL (ref 82–98)
MONOCYTES # BLD AUTO: 0.6 K/UL (ref 0.3–1)
MONOCYTES NFR BLD: 10.5 % (ref 4–15)
MRSA SCREEN BY PCR: NEGATIVE
NEUTROPHILS # BLD AUTO: 3.3 K/UL (ref 1.8–7.7)
NEUTROPHILS NFR BLD: 57.4 % (ref 38–73)
NRBC BLD-RTO: 0 /100 WBC
OHS QRS DURATION: 90 MS
OHS QTC CALCULATION: 431 MS
PLATELET # BLD AUTO: 178 K/UL (ref 150–450)
PMV BLD AUTO: 10.4 FL (ref 9.2–12.9)
POTASSIUM SERPL-SCNC: 4.1 MMOL/L (ref 3.5–5.1)
PROTHROMBIN TIME: 10.9 SEC (ref 9–12.5)
RBC # BLD AUTO: 4.25 M/UL (ref 4.6–6.2)
SODIUM SERPL-SCNC: 142 MMOL/L (ref 136–145)
WBC # BLD AUTO: 5.72 K/UL (ref 3.9–12.7)

## 2024-06-11 PROCEDURE — 87641 MR-STAPH DNA AMP PROBE: CPT | Performed by: INTERNAL MEDICINE

## 2024-06-11 PROCEDURE — 93005 ELECTROCARDIOGRAM TRACING: CPT | Performed by: GENERAL PRACTICE

## 2024-06-11 PROCEDURE — 80048 BASIC METABOLIC PNL TOTAL CA: CPT | Performed by: INTERNAL MEDICINE

## 2024-06-11 PROCEDURE — 85610 PROTHROMBIN TIME: CPT | Performed by: INTERNAL MEDICINE

## 2024-06-11 PROCEDURE — 85025 COMPLETE CBC W/AUTO DIFF WBC: CPT | Performed by: INTERNAL MEDICINE

## 2024-06-11 PROCEDURE — 93010 ELECTROCARDIOGRAM REPORT: CPT | Mod: ,,, | Performed by: GENERAL PRACTICE

## 2024-06-11 PROCEDURE — 85730 THROMBOPLASTIN TIME PARTIAL: CPT | Performed by: INTERNAL MEDICINE

## 2024-06-11 NOTE — DISCHARGE INSTRUCTIONS
Your procedure is scheduled for: Wednesday, June 12, 2024            [facility] at: 5:30 A.M.      Nothing to eat or drink after midnight the night before your procedure.  Do not take any medications the morning of your procedure  Bring all your medications with you in the original pill bottles from pharmacy.  If you take blood thinners, ask your doctor when you should stop taking them.  Do your Hibiclens wash the night before and morning of your procedure.  If you use a CPAP or BiPAP at home, please bring it with you the day of your procedure.  Make arrangements for someone you know to drive you home after your procedure. Taxi and Uber are not acceptable.            Any questions call [facility] at [phone]

## 2024-06-11 NOTE — PRE-PROCEDURE INSTRUCTIONS
Patient arrived ambulatory with generalized weakness noted, assisted by brother Celestine Ayala. Patient states use of walker but is at home. Reviewed history and medicines with mostly brother however patient able to answer questions appropriately. Patient and brother verbalized understanding of pre op instructions. Escorted patient and brother to lab.

## 2024-06-11 NOTE — TELEPHONE ENCOUNTER
06/11/24    Mr. Ayala came to the office today for his second patch.  He brought his new box in from [facility].  He will be having a pacer placed in the am and Dr Jimenez stated that we could go ahead and take the current monitor off and also mail the new monitor back to [facility] that we would not need second patch at this time.  Securred everything and the patient's brother will take them both to [employer] today.  I contacted Derrick at [facility] and he will make sure that the patienit only gets charged for the first 2 weeks with patch number 1.

## 2024-06-12 ENCOUNTER — HOSPITAL ENCOUNTER (INPATIENT)
Facility: HOSPITAL | Age: 89
LOS: 4 days | Discharge: HOME | DRG: 876 | End: 2024-06-18
Attending: INTERNAL MEDICINE | Admitting: HOSPITALIST
Payer: MEDICARE

## 2024-06-12 DIAGNOSIS — R00.1 SYMPTOMATIC BRADYCARDIA: ICD-10-CM

## 2024-06-12 DIAGNOSIS — I49.9 ARRHYTHMIA: ICD-10-CM

## 2024-06-12 DIAGNOSIS — F03.918 DEMENTIA WITH BEHAVIORAL DISTURBANCE: ICD-10-CM

## 2024-06-12 DIAGNOSIS — I44.2 COMPLETE AV BLOCK: ICD-10-CM

## 2024-06-12 DIAGNOSIS — I47.20 V-TACH: ICD-10-CM

## 2024-06-12 DIAGNOSIS — G93.41 ENCEPHALOPATHY, METABOLIC: ICD-10-CM

## 2024-06-12 DIAGNOSIS — I44.1 MOBITZ TYPE 2 SECOND DEGREE AV BLOCK: ICD-10-CM

## 2024-06-12 DIAGNOSIS — Z78.1: Primary | ICD-10-CM

## 2024-06-12 LAB
OHS QRS DURATION: 102 MS
OHS QTC CALCULATION: 438 MS

## 2024-06-12 PROCEDURE — C1769 GUIDE WIRE: HCPCS | Performed by: INTERNAL MEDICINE

## 2024-06-12 PROCEDURE — 63600175 PHARM REV CODE 636 W HCPCS: Performed by: INTERNAL MEDICINE

## 2024-06-12 PROCEDURE — 33208 INSRT HEART PM ATRIAL & VENT: CPT | Performed by: INTERNAL MEDICINE

## 2024-06-12 PROCEDURE — 63600175 PHARM REV CODE 636 W HCPCS: Performed by: HOSPITALIST

## 2024-06-12 PROCEDURE — 93005 ELECTROCARDIOGRAM TRACING: CPT | Performed by: GENERAL PRACTICE

## 2024-06-12 PROCEDURE — C1785 PMKR, DUAL, RATE-RESP: HCPCS | Performed by: INTERNAL MEDICINE

## 2024-06-12 PROCEDURE — 25000003 PHARM REV CODE 250: Performed by: INTERNAL MEDICINE

## 2024-06-12 PROCEDURE — 25500020 PHARM REV CODE 255: Performed by: INTERNAL MEDICINE

## 2024-06-12 PROCEDURE — 93010 ELECTROCARDIOGRAM REPORT: CPT | Mod: ,,, | Performed by: GENERAL PRACTICE

## 2024-06-12 PROCEDURE — 99152 MOD SED SAME PHYS/QHP 5/>YRS: CPT | Performed by: INTERNAL MEDICINE

## 2024-06-12 PROCEDURE — 99152 MOD SED SAME PHYS/QHP 5/>YRS: CPT | Mod: ,,, | Performed by: INTERNAL MEDICINE

## 2024-06-12 PROCEDURE — C1894 INTRO/SHEATH, NON-LASER: HCPCS | Performed by: INTERNAL MEDICINE

## 2024-06-12 PROCEDURE — 0JH606Z INSERTION OF PACEMAKER, DUAL CHAMBER INTO CHEST SUBCUTANEOUS TISSUE AND FASCIA, OPEN APPROACH: ICD-10-PCS | Performed by: INTERNAL MEDICINE

## 2024-06-12 PROCEDURE — 33208 INSRT HEART PM ATRIAL & VENT: CPT | Mod: KX,,, | Performed by: INTERNAL MEDICINE

## 2024-06-12 PROCEDURE — C1898 LEAD, PMKR, OTHER THAN TRANS: HCPCS | Performed by: INTERNAL MEDICINE

## 2024-06-12 PROCEDURE — 27201423 OPTIME MED/SURG SUP & DEVICES STERILE SUPPLY: Performed by: INTERNAL MEDICINE

## 2024-06-12 PROCEDURE — 99153 MOD SED SAME PHYS/QHP EA: CPT | Performed by: INTERNAL MEDICINE

## 2024-06-12 PROCEDURE — 02H63JZ INSERTION OF PACEMAKER LEAD INTO RIGHT ATRIUM, PERCUTANEOUS APPROACH: ICD-10-PCS | Performed by: INTERNAL MEDICINE

## 2024-06-12 PROCEDURE — 02HK3JZ INSERTION OF PACEMAKER LEAD INTO RIGHT VENTRICLE, PERCUTANEOUS APPROACH: ICD-10-PCS | Performed by: INTERNAL MEDICINE

## 2024-06-12 PROCEDURE — 25000003 PHARM REV CODE 250: Performed by: HOSPITALIST

## 2024-06-12 DEVICE — LEAD 5076-45 MRI US RCMCRD
Type: IMPLANTABLE DEVICE | Site: CHEST | Status: FUNCTIONAL
Brand: CAPSUREFIX NOVUS MRI™ SURESCAN®

## 2024-06-12 DEVICE — IPG W1DR01 AZURE XT DR MRI USA
Type: IMPLANTABLE DEVICE | Site: CHEST | Status: FUNCTIONAL
Brand: AZURE™ XT DR MRI SURESCAN™

## 2024-06-12 DEVICE — LEAD 5076-52 MRI US RCMCRD
Type: IMPLANTABLE DEVICE | Site: CHEST | Status: FUNCTIONAL
Brand: CAPSUREFIX NOVUS MRI™ SURESCAN®

## 2024-06-12 RX ORDER — IODIXANOL 320 MG/ML
INJECTION, SOLUTION INTRAVASCULAR
Status: DISCONTINUED | OUTPATIENT
Start: 2024-06-12 | End: 2024-06-12 | Stop reason: HOSPADM

## 2024-06-12 RX ORDER — LORAZEPAM 2 MG/ML
0.5 INJECTION INTRAMUSCULAR ONCE
Status: COMPLETED | OUTPATIENT
Start: 2024-06-12 | End: 2024-06-12

## 2024-06-12 RX ORDER — LIDOCAINE HYDROCHLORIDE 10 MG/ML
INJECTION, SOLUTION EPIDURAL; INFILTRATION; INTRACAUDAL; PERINEURAL
Status: DISCONTINUED | OUTPATIENT
Start: 2024-06-12 | End: 2024-06-12 | Stop reason: HOSPADM

## 2024-06-12 RX ORDER — LORAZEPAM 2 MG/ML
1 INJECTION INTRAMUSCULAR EVERY 6 HOURS PRN
Status: DISCONTINUED | OUTPATIENT
Start: 2024-06-12 | End: 2024-06-13

## 2024-06-12 RX ORDER — HALOPERIDOL 5 MG/ML
5 INJECTION INTRAMUSCULAR EVERY 6 HOURS PRN
Status: DISCONTINUED | OUTPATIENT
Start: 2024-06-12 | End: 2024-06-13

## 2024-06-12 RX ORDER — ONDANSETRON HYDROCHLORIDE 2 MG/ML
4 INJECTION, SOLUTION INTRAVENOUS EVERY 6 HOURS PRN
Status: DISCONTINUED | OUTPATIENT
Start: 2024-06-12 | End: 2024-06-18 | Stop reason: HOSPADM

## 2024-06-12 RX ORDER — HALOPERIDOL 5 MG/ML
5 INJECTION INTRAMUSCULAR EVERY 6 HOURS PRN
Status: DISCONTINUED | OUTPATIENT
Start: 2024-06-12 | End: 2024-06-12

## 2024-06-12 RX ORDER — ACETAMINOPHEN 325 MG/1
650 TABLET ORAL EVERY 4 HOURS PRN
Status: DISCONTINUED | OUTPATIENT
Start: 2024-06-12 | End: 2024-06-18 | Stop reason: HOSPADM

## 2024-06-12 RX ORDER — FENTANYL CITRATE 50 UG/ML
INJECTION, SOLUTION INTRAMUSCULAR; INTRAVENOUS
Status: DISCONTINUED | OUTPATIENT
Start: 2024-06-12 | End: 2024-06-12 | Stop reason: HOSPADM

## 2024-06-12 RX ORDER — ONDANSETRON HYDROCHLORIDE 2 MG/ML
INJECTION, SOLUTION INTRAVENOUS
Status: DISCONTINUED | OUTPATIENT
Start: 2024-06-12 | End: 2024-06-12 | Stop reason: HOSPADM

## 2024-06-12 RX ORDER — LORAZEPAM 2 MG/ML
INJECTION INTRAMUSCULAR
Status: DISPENSED
Start: 2024-06-12 | End: 2024-06-13

## 2024-06-12 RX ORDER — MIDAZOLAM HYDROCHLORIDE 2 MG/2ML
INJECTION, SOLUTION INTRAMUSCULAR; INTRAVENOUS
Status: DISCONTINUED | OUTPATIENT
Start: 2024-06-12 | End: 2024-06-12 | Stop reason: HOSPADM

## 2024-06-12 RX ORDER — CEFAZOLIN SODIUM 2 G/50ML
2 SOLUTION INTRAVENOUS
Status: COMPLETED | OUTPATIENT
Start: 2024-06-12 | End: 2024-06-13

## 2024-06-12 RX ORDER — CEFAZOLIN SODIUM 1 G/3ML
INJECTION, POWDER, FOR SOLUTION INTRAMUSCULAR; INTRAVENOUS
Status: DISCONTINUED | OUTPATIENT
Start: 2024-06-12 | End: 2024-06-12 | Stop reason: HOSPADM

## 2024-06-12 RX ORDER — QUETIAPINE FUMARATE 25 MG/1
25 TABLET, FILM COATED ORAL
Status: DISCONTINUED | OUTPATIENT
Start: 2024-06-12 | End: 2024-06-15

## 2024-06-12 RX ORDER — LORAZEPAM 2 MG/ML
0.5 INJECTION INTRAMUSCULAR EVERY 6 HOURS PRN
Status: DISCONTINUED | OUTPATIENT
Start: 2024-06-12 | End: 2024-06-12

## 2024-06-12 RX ADMIN — QUETIAPINE 25 MG: 25 TABLET ORAL at 05:06

## 2024-06-12 RX ADMIN — LORAZEPAM 0.5 MG: 2 INJECTION INTRAMUSCULAR; INTRAVENOUS at 04:06

## 2024-06-12 RX ADMIN — CEFAZOLIN SODIUM 2 G: 2 SOLUTION INTRAVENOUS at 05:06

## 2024-06-12 RX ADMIN — LORAZEPAM 1 MG: 2 INJECTION INTRAMUSCULAR; INTRAVENOUS at 10:06

## 2024-06-12 RX ADMIN — HALOPERIDOL LACTATE 5 MG: 5 INJECTION, SOLUTION INTRAMUSCULAR at 10:06

## 2024-06-12 NOTE — PLAN OF CARE
Bedside hand off report given to KAREN Lim .  Chart given. Left to cath lab via stretcher for scheduled procedure. Brother returned to waiting area.

## 2024-06-12 NOTE — PLAN OF CARE
Report received from KAREN Lim. Arrived back from cath lab via stretcher. No complaints of pain or distress noted. Dressing to upper right chest wall CDI. Post PM insertion instructions given. Resting in bed with call light in reach.

## 2024-06-12 NOTE — NURSING
[id number] report received from KAREN Dye. Awaiting patient.     [id number] patient arrived to the floor. Patient awake and alert. No acute distress noted. Patient oriented to room. Patient oriented to self and place. Patient oriented to room. Brother at the bedside.  Post pacemaker instructions and education explained to the patient and brother.Safety precautions in place. Will continue to monitor.

## 2024-06-12 NOTE — PLAN OF CARE
Dr. Jimenez at bedside discussing plan of care with patient and brother. Patient will be going back to cath lab to attempt for right chest wall pacemaker insertion.

## 2024-06-12 NOTE — PLAN OF CARE
Report received from KAREN Lim. Arrived back from cath lab via stretcher. No complaints of pain or distress noted. Puncture site to left subclavian open to air and CDI. Post procedure instructions given. Resting in bed with call light in reach.

## 2024-06-12 NOTE — Clinical Note
The lead was inserted under fluorscopic guidance. The lead was inserted in the right chest wall. A new lead was attached to the right atrium.

## 2024-06-12 NOTE — Clinical Note
A percutaneous stick to the right subclavian vein was performed. BILATERAL URETERAL STENT CHANGES performed by Becca Fisher MD at [facility] Bilateral 2/26/2020    CYSTOSCOPY BILATERAL URETERAL STENT CHANGES INDICATED PROCEDURE performed by Becca Fisher MD at [facility] Bilateral 5/28/2020    CYSTOSCOPY, BILATERAL RETROGRADE PYELOGRAMS, BILATERAL URETERAL STENT CHANGES performed by Becca Fisher MD at [facility] Bilateral 10/15/2020    CYSTOSCOPY, BILATERAL URETERAL STENT CHANGES performed by Becca Fisher MD at [facility] N/A 10/15/2020    POSSIBLE BIOPSY FULGURATION/ TURBT  BLADDER TUMOR performed by Becca Fisher MD at [facility] Bilateral 4/1/2021    CYSTOSCOPY, BILATERAL URETERAL STENT REMOVAL AND REPLACEMENT AND FULGERATION OF BLADDER TUMOR AND BLADDER BIOPSY performed by Becca Fisher MD at [address] [place] [address] / [facility] / [facility] Right 8/18/2019    CYSTOSCOPY RETROGRADE PYELOGRAM RIGHT URETERAL  STENT INSERTION FULGERATION OF BLADDER TUMOR performed by Becca Fisher MD at [facility] Bilateral 1/5/2021    CYSTOSCOPY  BILARTERAL URETERAL STENT REMOVAL AND REPLACEMENT BILATERAL BILATERAL URETERAL CATHERIZATION BILATERAL RETROGRADE PYLEOGRAM performed by Becca Fisher MD at [facility] N/A 12/3/2019    BLADDER BIOPSY AND FULGURATION performed by Becca Fisher MD at [facility] N/A 5/28/2020    BIOPSIES WITH FULGURATION OF BLADDER TUMORS performed by Becca Fisher MD at [facility] [address] Bilateral     cataract or    HC INJECT OTHER PERPHRL NERV Left 10/28/2016    FLURO GUIDED HIP INJECITON performed by Aram Aguila MD at [facility] / REMOVAL / REPLACEMENT VENOUS ACCESS CATHETER Right 8/20/2019    INSERTION OF RIGHT INTERNAL JUGULAR SINGLE LUMEN POWER PORT performed by Becky Oliveros DO at [address] [employer] [facility]  LUMBAR FUSION N/A 5/6/2020    REMOVAL OF INSTRUMENTATION, EXPLORATION OF FUSION L1-3, REVISION UNINSTRUMENTED POSTERIOR SPINAL FUSION L1-3 performed by Emperatriz Pak MD at [facility]      times 2... all levels    SPINE SURGERY      yesterday    TUNNELED VENOUS PORT PLACEMENT         Current Outpatient Medications   Medication Sig Dispense Refill    bisoprolol (ZEBETA) 5 MG tablet TAKE 1 TABLET BY MOUTH DAILY 90 tablet 1    Magic Mouthwash (MIRACLE MOUTHWASH) Swish and spit 5 mLs 4 times daily as needed for Irritation 240 mL 5    ibandronate (BONIVA) 150 MG tablet Take 1 tablet by mouth every 30 days Take one (1) tablet once per month in the morning with a full glass of water, on an empty stomach, and do not take anything else by mouth or lie down for the next 30 minutes. 1 tablet 6    DULoxetine (CYMBALTA) 60 MG extended release capsule Take 1 capsule by mouth daily (Patient taking differently: Take 30 mg by mouth daily Patient reports decreased 2-3 weeks ago) 30 capsule 5    omeprazole (PRILOSEC) 20 MG delayed release capsule TAKE 1 CAPSULE BY MOUTH TWICE DAILY      FEROSUL 325 (65 Fe) MG tablet TAKE 1 TABLET BY MOUTH TWICE DAILY 180 tablet 1    Calcium Carb-Cholecalciferol (CALCIUM 600 + D PO) Take 800 mg by mouth 3 times daily      ondansetron (ZOFRAN) 4 MG tablet Take 2 tablets by mouth every 8 hours as needed for Nausea or Vomiting 30 tablet 2    cyclobenzaprine (FLEXERIL) 10 MG tablet Take 1 tablet by mouth 3 times daily as needed for Muscle spasms 90 tablet 2    loperamide (IMODIUM A-D) 2 MG tablet Take 4 mg by mouth 4 times daily as needed       methadone (DOLOPHINE) 10 MG tablet Take 20 mg by mouth every 8 hours as needed for Pain. Indications: filled by Dr. Tony Coleman Pain mgt       gabapentin (NEURONTIN) 800 MG tablet Take 1 tablet by mouth 4 times daily for 31 days. 180 tablet 2     No current facility-administered medications for this visit.        Allergies   Allergen Reactions    Morphine Anxiety       Social History     Socioeconomic History    Marital status:      Spouse name: None    Number of children: None    Years of education: None    Highest education level: None   Occupational History    None   Tobacco Use    Smoking status: Former Smoker     Packs/day: 2.00     Years: 15.00     Pack years: 30.00     Types: Cigarettes     Quit date: 1986     Years since quittin.3    Smokeless tobacco: Never Used   Vaping Use    Vaping Use: Never used   Substance and Sexual Activity    Alcohol use: No    Drug use: No    Sexual activity: Yes     Partners: Female   Other Topics Concern    None   Social History Narrative    None     Social Determinants of Health     Financial Resource Strain: Low Risk     Difficulty of Paying Living Expenses: Not hard at all   Food Insecurity: No Food Insecurity    Worried About Running Out of Food in the Last Year: Never true    Azam of Food in the Last Year: Never true   Transportation Needs:     Lack of Transportation (Medical):  Lack of Transportation (Non-Medical):    Physical Activity:     Days of Exercise per Week:     Minutes of Exercise per Session:    Stress:     Feeling of Stress :    Social Connections:     Frequency of Communication with Friends and Family:     Frequency of Social Gatherings with Friends and Family:     Attends Christian Services:     Active Member of Clubs or Organizations:     Attends Club or Organization Meetings:     Marital Status:    Intimate Partner Violence:     Fear of Current or Ex-Partner:     Emotionally Abused:     Physically Abused:     Sexually Abused:        Family History   Problem Relation Age of Onset    High Blood Pressure Mother     High Blood Pressure Father     Colon Cancer Father     Diabetes Father        REVIEW OF SYSTEMS:  Review of Systems   Constitutional: Negative for chills and fever.    HENT: Negative for facial swelling and trouble swallowing. Eyes: Negative for discharge and redness. Respiratory: Negative for chest tightness and shortness of breath. Cardiovascular: Negative for chest pain and palpitations. Gastrointestinal: Negative for nausea and vomiting. Endocrine: Negative for cold intolerance and heat intolerance. Genitourinary: Positive for dysuria and hematuria. Negative for decreased urine volume and genital sores. Musculoskeletal: Negative for joint swelling and neck pain. Skin: Negative for color change and rash. Allergic/Immunologic: Negative for environmental allergies and immunocompromised state. Neurological: Negative for dizziness and numbness. Hematological: Negative for adenopathy. Does not bruise/bleed easily. Psychiatric/Behavioral: Negative for behavioral problems and hallucinations. PHYSICAL EXAM:  There were no vitals taken for this visit. Physical Exam  Constitutional:       General: He is not in acute distress. Appearance: Normal appearance. He is well-developed. HENT:      Head: Normocephalic and atraumatic. Nose: Nose normal.   Eyes:      General: No scleral icterus. Conjunctiva/sclera: Conjunctivae normal.      Pupils: Pupils are equal, round, and reactive to light. Neck:      Trachea: No tracheal deviation. Cardiovascular:      Rate and Rhythm: Normal rate and regular rhythm. Pulmonary:      Effort: Pulmonary effort is normal. No respiratory distress. Breath sounds: No stridor. Abdominal:      General: There is no distension. Palpations: Abdomen is soft. There is no mass. Tenderness: There is no abdominal tenderness. Comments: Ileostomy   Musculoskeletal:         General: No tenderness. Normal range of motion. Cervical back: Normal range of motion and neck supple. Lymphadenopathy:      Cervical: No cervical adenopathy. Skin:     General: Skin is warm and dry. Findings: No erythema.    Neurological:      Mental Status: He is alert and oriented to person, place, and time. Psychiatric:         Behavior: Behavior normal.         Judgment: Judgment normal.             DATA:  CBC:   Lab Results   Component Value Date    WBC 4.7 09/01/2021    RBC 3.56 09/01/2021    HGB 10.2 09/01/2021    HCT 32.3 09/01/2021    MCV 90.7 09/01/2021    MCH 28.7 09/01/2021    MCHC 31.6 09/01/2021    RDW 13.5 09/01/2021     09/01/2021    MPV 10.3 09/01/2021     CMP:    Lab Results   Component Value Date     09/01/2021    K 5.0 09/01/2021    K 4.8 05/07/2020     09/01/2021    CO2 23 09/01/2021    BUN 11 09/01/2021    CREATININE 1.2 09/01/2021    GFRAA >59 09/01/2021    AGRATIO 1.6 02/11/2020    LABGLOM 60 09/01/2021    GLUCOSE 102 09/01/2021    PROT 6.3 09/01/2021    LABALBU 3.8 09/01/2021    CALCIUM 8.7 09/01/2021    BILITOT 0.3 09/01/2021    ALKPHOS 108 09/01/2021    AST 17 09/01/2021    ALT 9 09/01/2021     Results for orders placed or performed in visit on 09/13/21   POCT Urinalysis Dipstick w/ Micro (Auto)   Result Value Ref Range    Color, UA Yellow     Clarity, UA Clear Clear    Glucose, Ur neg     Bilirubin Urine 0 mg/dL    Ketones, Urine Negative     Specific Gravity, UA 1.020 1.005 - 1.030    Blood, Urine Positive     pH, UA 7.0 4.5 - 8.0    Protein, UA Positive (A) Negative    Nitrite, Urine Negative     Leukocytes, UA small     Urobilinogen, Urine      rbc urine, poc 50     wbc urine, poc 4     bacteria urine, poc 0     yeast urine, poc      casts urine, poc      epi cells urine, poc      crystals urine, poc       Lab Results   Component Value Date    PSA 0.3 08/17/2019     Lab Results   Component Value Date    PSAFREEPCT 33 08/17/2019             IMAGING:  CT scan abdomen pelvis done on 9/3/2021 was reviewed    Impression   A 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 Bilateral ureteral stents in place.  There is increasing right-sided hydronephrosis since the previous study. Right renal atrophy similar to the previous study. Moderate asymmetrical thickening of the incompletely distended urinary   bladder is similar to the previous study. This may partly be due to   incomplete distention. An inflammatory process or a neoplastic process   is not excluded. Moderate intrahepatic biliary dilatation is similar to the previous   study and probably due to a prior cholecystectomy. Moderate dilatation of the contrast filled small bowel loops may   represent an ileus or partial distal small bowel obstruction. 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 The stable compression fractures of the lower thoracic and proximal   lumbar vertebrae and hardware fusion similar to the previous study. Signed by Dr Jordin Chavez             1. Extrinsic ureteral obstruction  This is managed with chronic indwelling bilateral ureteral stents we placed a metal residences back in April so he is not due his stent change until October however given the new findings I described above we will taken the operating room and will plan stent change at that time. 2. History of bladder cancer  Cystoscopy today showed no bladder cancer recurrence in the bladder however there appears to be involvement of the distal and maybe even proximal ureter  - Cystoscopy  - POCT Urinalysis Dipstick w/ Micro (Auto)    3. 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 Will need taken to the operating room for cystoscopy right retrograde pyelogram and to see if we can do direct visualization with right ureteroscopy possible biopsy to determine the extent of involvement of the right ureter.   I did tell him and his daughter should this be focal it would lend itself to be able to be treated with endoscopic treatment with laser ablation however this is more extensive than a nephro ureterectomy would be indicated. Given his history of prior surgery, ileostomy, prior pelvic radiation etc. he would have to have this done at a tertiary care center. Orders Placed This Encounter   Procedures    POCT Urinalysis Dipstick w/ Micro (Auto)    Cystoscopy        Return for PT to be scheduled for Surgery. All information inputted into the note by the MA to include chief complaint, past medical history, past surgical history, medications, allergies, social and family history and review of systems has been reviewed and updated as needed by me. EMR Dragon/transcription disclaimer: Much of this documentt is electronic  transcription/translation of spoken language to printed text. The  electronic translation of spoken language may be erroneous, or at times,  nonsensical words or phrases may be inadvertently transcribed.  Although I  have reviewed the document for such errors, some may still exist.

## 2024-06-12 NOTE — Clinical Note
The lead was inserted under fluorscopic guidance. The lead was inserted in the right chest wall. A new lead was attached to the right ventricle.

## 2024-06-12 NOTE — Clinical Note
MD unable to obtain access on patient due to patients anatomy. Will perform a CT of chest and insert PPM later.

## 2024-06-12 NOTE — ASSESSMENT & PLAN NOTE
Noted on the monitor the procedure risks benefits have been explained to the patient for a pacemaker he is in agreement to proceed

## 2024-06-12 NOTE — Clinical Note
Chief Complaints and History of Present Illnesses   Patient presents with     Diplopia Follow Up     No changes since last visit. Wears gls without prism more often than prism glasses still.Prism glasses are hard to move eyes around in all fields of gaze. No ptosis ever noted, Right head tilt not necessarily noted now or in past. But does note he likes chin up and left turn.    Review of systems for the eyes was negative other than the pertinent positives and negatives noted in the HPI.  History is obtained from the patient and spouse.                 Primary care: Philomena Ramírez   [facility]  Assessment & Plan   Benjamin Hooper is a 65 year old male who presents with:     Hypotropia of right eye  Ocular torticollis  Myopia of both eyes  Regular astigmatism of both eyes   10 year history of gradually increasing intermittent binocular diplopia. At first seemed related to fatigue. Now not fatigue related but positional. Worse in up and left gazes.  MG workup so far: Ach receptor binding Ab negative < 0.02, Ach receptor modulating 0%, striated muscle Ab <1:120 titer/negative; Ach receptor blocking negative (new)  MARCELLA workup so far: negative labs: TSH WNL at 1.34, T4 Free 0.95, TSI <1.0  Pending: MUSK ab, Thyroglobulin abs  MRI reviewed with Thanh Hoffman, neuroradiologist, and no causative abnormality for strabismus identified, EOMs are not enlarged and there is no radiographic evidence of old orbital trauma.  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 Deficient across upgaze right eye. Minimal  adduction deficit left eye.  Total of 25 degrees excylcotorsion on double apple jomar last visit. Today maximal 17 deg excyclo in forced primary; synoptophore 10 degrees excylco.   Fuses in free space with 12 PD base up right eye - very small area of single binocular vision.  No  The generator was inserted into pocket in the right upper chest. "fatigue on extended upgaze but does have some mild weakness of the orbicularis muscles. Questionable/equivocal response of ptosis to extended upgaze left eye with improvement with ice pack test.  - Complete workup to rule out MG and MARCELLA, if negative proceed with eye muscle surgery.  - I recommend eye muscle surgery. Today with Benjamin and his spouse, I reviewed the indications, risks, benefits, and alternatives of eye muscle surgery including, but not limited to, failure obtain the desired ocular alignment (\"over\" or \"under\" correction), diplopia, and damage to any structure in or around the eye that may necessitate treatment with medicine, laser, or surgery. I further explained that the goal of surgery is to help control Benjamin's strabismus. Surgery will not \"cure\" Benjamin's strabismus or resolve/prevent the need for refractive corretion. Additional strabismus surgery may be required in the short or long term. I emphasized that regular follow-up to monitor and optimize his vision and alignment would be necessary. We also discussed the risks of surgical injury, bleeding, and infection which may necessitate further medical or surgical treatment and which may result in diplopia, loss of vision, blindness, or loss of the eye(s) in less than 1% of cases and the remote possibility of permanent damage to any organ system or death with the use of general anesthesia.  I explained that we would hide visible scars as much as possible in natural creases but that every patient heals and pigments differently resulting in a variable degree of scarring to the eyes or surrounding facial structures after surgery.  I provided multiple opportunities for questions, answered all questions to the best of my ability, and confirmed that my answers and my discussion were understood.  Lengthy discussion regarding eye muscle surgery, potential need for staged procedure, touch-up procedure and or post-operative prism glasses and goal of " "expanding field of binocular single vision.  - Likely LSR recession +/- RIR recession (consider offset for torsion)  - Myopic astigmatism with best corrected visual acuity 20/20 with cycloplegic refraction stable to non-prism glasses prescription. Updated prescription provided.        Return for pending workup.    Patient Instructions   I recommend eye muscle surgery. Today with Benjamin and his spouse, I reviewed the indications, risks, benefits, and alternatives of eye muscle surgery including, but not limited to, failure obtain the desired ocular alignment (\"over\" or \"under\" correction), diplopia, and damage to any structure in or around the eye that may necessitate treatment with medicine, laser, or surgery. I further explained that the goal of surgery is to help control Benjamin's strabismus. Surgery will not \"cure\" Benjamin's strabismus or resolve/prevent the need for refractive corretion. Additional strabismus surgery may be required in the short or long term. I emphasized that regular follow-up to monitor and optimize his vision and alignment would be necessary. We also discussed the risks of surgical injury, bleeding, and infection which may necessitate further medical or surgical treatment and which may result in diplopia, loss of vision, blindness, or loss of the eye(s) in less than 1% of cases and the remote possibility of permanent damage to any organ system or death with the use of general anesthesia.  I explained that we would hide visible scars as much as possible in natural creases but that every patient heals and pigments differently resulting in a variable degree of scarring to the eyes or surrounding facial structures after surgery.  I provided multiple opportunities for questions, answered all questions to the best of my ability, and confirmed that my answers and my discussion were understood.    Read more about your eye muscle surgery or strabismus surgery online at: http://www.aapos.org/terms. " Our pediatric ophthalmologists and certified orthoptists are members of the American Association for Pediatric Ophthalmology and Strabismus, an international organization of medical doctors (MDs) and certified orthoptists who completed specialized training in the medical and surgical treatments of all pediatric eye diseases and adult eye muscle disorders.      For a free and informative book on pediatric eye diseases and adult strabismus, go to:  http://RetailerSaver.com.[email or web address]/eyemusclebook    For more information, see also:  Http://eyewiki.aao.org/Category:Pediatric_Ophthalmology/Strabismus        Visit Diagnoses & Orders    ICD-10-CM    1. Hypotropia of right eye H50.21 Sensorimotor   2. Ocular torticollis R29.891 Sensorimotor   3. Myopia of both eyes H52.13    4. Regular astigmatism of both eyes H52.223       Attending Physician Attestation:  Complete documentation of historical and exam elements from today's encounter can be found in the full encounter summary report (not reduplicated in this progress note).  I personally obtained the chief complaint(s) and history of present illness.  I confirmed and edited as necessary the review of systems, past medical/surgical history, family history, social history, and examination findings as documented by others; and I examined the patient myself.  I personally reviewed the relevant tests, images, and reports as documented above.  I formulated and edited as necessary the assessment and plan and discussed the findings and management plan with the patient and family. - Demetrice Crawford MD

## 2024-06-12 NOTE — NURSING
"1512: Pt continuously keeps trying to exit bed. Pt somewhat confused to situation & time. Notified Dr. Jimenez. See new orders for need for sitter placed.   1513: Notified Corky Child. She stated she "will see where she can pull from." Notified primary RN Da.   "

## 2024-06-12 NOTE — Clinical Note
The shallow fascial layer was closed with suture. Transition Support Program at [facility]  This is the patient navigation program that helps with your follow-up care. Please call us at [phone] if you need assistance.

## 2024-06-12 NOTE — Clinical Note
The lead thresholds were tested and was secured in place. A new lead was attached to the right ventricle.

## 2024-06-12 NOTE — PLAN OF CARE
Nursing Transfer Note    Phone report called to KAREN Benton at 1352.  Patient transfer at 1428 with bedside hand off to KAREN Benton.    Transfer To: 3008    Transfer From: [facility]    Transfer via stretcher    Transfer with cardiac monitoring, personal belongings    Transported by KAREN Dye    Chart send with patient: Yes    Notified: brother at bedside    Upon arrival to floor: cardiac monitor applied, patient oriented to room, call bell in reach, and bed in lowest position

## 2024-06-12 NOTE — NURSING
1627 patient getting up out of bed and acting belligerent. Patient hostile and fighting staff. Attempted to reorient patient and calm patient down. Patient educated on pacemaker post instructions. Dr. Jimenez notified on above encounter. MD stated to give 0.5mg IV ativan once and prn 6hr for agitation. Security notified to assist with getting patient back into bed. Family friend at the bedside. Safety precautions in place. House supervisor Danyell notified again of order for bedside sitter.     1655 patient sitting on the side of the bed with arm out of arm sling. Patient remains agitated. Staff and brother at the bedside. Arm placed back in the immobilizer.   1711 patient getting out out bed with alarm going off. Attempted to assist patient and patient became combative and hostile again. Patient swinging at family and staff. Dr. Jimenez notified of patient's behavior. MD stated to give an additional dose of 0.5mg IV once if needed. Consult hospital medicine for behavior/medical managementsd  1721 patient sitting on the side of bed eating without hostile behavior. Cooperative at this time. Will continue to reassess.

## 2024-06-12 NOTE — PLAN OF CARE
Ambulated onto unit with unsteady gait. Standby assist. No complaints of pain or distress noted. Undressed of personal clothing and gown on. Pre-op. Patient noted to be poor historian and pleasantly confused. Brother at bedside to assist we pre-op and history. Resting in bed with call light in reach.

## 2024-06-12 NOTE — PLAN OF CARE
Bedside hand off report given to KAREN Lim .  Chart given. Left to cath lab via stretcher to re-attempt procedure. Brother returned to waiting area.

## 2024-06-13 PROBLEM — F03.918 DEMENTIA WITH BEHAVIORAL DISTURBANCE: Status: ACTIVE | Noted: 2024-06-13

## 2024-06-13 PROBLEM — Z78.1: Status: ACTIVE | Noted: 2024-06-13

## 2024-06-13 PROCEDURE — G0378 HOSPITAL OBSERVATION PER HR: HCPCS

## 2024-06-13 PROCEDURE — 63600175 PHARM REV CODE 636 W HCPCS: Performed by: HOSPITALIST

## 2024-06-13 PROCEDURE — 96372 THER/PROPH/DIAG INJ SC/IM: CPT | Performed by: HOSPITALIST

## 2024-06-13 PROCEDURE — 63600175 PHARM REV CODE 636 W HCPCS: Performed by: INTERNAL MEDICINE

## 2024-06-13 RX ORDER — HALOPERIDOL 5 MG/ML
2 INJECTION INTRAMUSCULAR EVERY 6 HOURS PRN
Status: DISCONTINUED | OUTPATIENT
Start: 2024-06-13 | End: 2024-06-15

## 2024-06-13 RX ADMIN — LORAZEPAM 1 MG: 2 INJECTION INTRAMUSCULAR; INTRAVENOUS at 07:06

## 2024-06-13 RX ADMIN — CEFAZOLIN SODIUM 2 G: 2 SOLUTION INTRAVENOUS at 03:06

## 2024-06-13 RX ADMIN — HALOPERIDOL LACTATE 2 MG: 5 INJECTION, SOLUTION INTRAMUSCULAR at 03:06

## 2024-06-13 NOTE — NURSING
1240: Dr. Jimenez at bedside and aware of bruising to right chest wall post pm insertion site. Post op dressing noted and changed by Dr. Jimenez (4x4 & 1 in paper tape). Sitter and pt's brother at bedside. Pt not in restraint. Pt's brother updated on POC.

## 2024-06-13 NOTE — NURSING
Security and house supervisor at bedside. Patient still agitated and attempting to get out of bed, remove brace and IV lines, and yelling at staff. Patient repeatedly attempts to hit and kick staff.  PRN ativan given.

## 2024-06-13 NOTE — ASSESSMENT & PLAN NOTE
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 Dementia is mild. The patient does have signs of behavioral disturbance.  Continue non-pharmacologic interventions to prevent delirium (No VS between 11PM-5AM, activity during day, opening blinds, providing glasses/hearing aids, and up in chair during daytime). Will avoid narcotics and benzos unless absolutely necessary. PRN anti-psychotics are ordered to avoid self harm behaviors.  Will continue 25 mg Seroquel in the evenings.  Also Haldol 2 mg IM as needed for acute non-redirectable agitation.

## 2024-06-13 NOTE — NURSING
Notified Dr. Ceja that patient is still fighting staff, attempting to hit and yelling at staff, and attempting to remove brace and IV lines. See new order for soft wrist restraints.

## 2024-06-13 NOTE — PLAN OF CARE
"[facility]  Initial Discharge Assessment      Assessment completed at bedside with Pt, and all information on FaceSheet confirmed, including demographics, PCP, pharmacy and insurance. Pt has not addressed advance directives. He currently lives in [place] by himself.  He currently does not have a PCP and said his last appointment was "months" ago. He denies any DME/HH/HD/Blood Thinners. Family/friend will transport back home after discharge. He denies any recent hospitalizations. Upon admission to room on 3000, Pt was verbally abusive, and combative with staff. Pt repeated "Get out of my house! Why are these people in my house?" Security was called and Pt was calmed and got back in his bed. Pt's brother, Celestine came and explained he is looking for assisted living or a permanent placement for PT because he is Pt's only family and he cannot take care of him due to his own physical limitations. Celestine andPeggy Admissions for [facility] have already begun paperwork with Dio Freeman in [facility]. Case Management to continue to follow for discharge planning needs.        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 No    Discharge Plan A: Skilled Nursing Facility  Discharge Plan B: Long-term acute care facility (LTAC)      [id number] [place] - [address] [facility]  [address] [address]  Phone: [phone] Fax: [phone]      Initial Assessment (most recent)       Adult Discharge Assessment - 06/13/24 1056 " "         Discharge Assessment    Assessment Type Discharge Planning Assessment     Confirmed/corrected address, phone number and insurance Yes     Confirmed Demographics Correct on Facesheet     Source of Information patient;family     When was your last doctors appointment? --   "months"    Does patient/caregiver understand observation status Yes     Reason For Admission Complete AV block     People in Home alone     Do you expect to return to your current living situation? Yes     Do you have help at home or someone to help you manage your care at home? No     Prior to hospitilization cognitive status: Unable to Assess     Current cognitive status: Not Oriented to Place     Walking or Climbing Stairs Difficulty no     Dressing/Bathing Difficulty no     Home Accessibility not wheelchair accessible     Home Layout Able to live on 1st floor     Equipment Currently Used at Home none     Readmission within 30 days? No     Patient currently being followed by outpatient case management? No     Do you currently have service(s) that help you manage your care at home? No     Do you take prescription medications? Yes     Do you have prescription coverage? Yes     Coverage MEDICARE - MEDICARE RAILROAD nursing home     Do you have any problems affording any of your prescribed medications? No     Is the patient taking medications as prescribed? yes     Who is going to help you get home at discharge? Peggy Bowman ([facility] friend)     How do you get to doctors appointments? family or friend will provide     Are you on dialysis? No     Do you take coumadin? No     Discharge Plan A Skilled Nursing Facility     Discharge Plan B Long-term acute care facility (LTAC)     DME Needed Upon Discharge  none     Discharge Plan discussed with: Caregiver     Name(s) and Number(s) Celestine Ayala (Brother)  [phone] (Mobile)     Transition of Care Barriers None                                "

## 2024-06-13 NOTE — NURSING
Nurses Note -- 4 Eyes      6/13/2024   4:37 PM      Skin assessed during: Transfer      [x] No Altered Skin Integrity Present    []Prevention Measures Documented      [] Yes- Altered Skin Integrity Present or Discovered   [] LDA Added if Not in Epic (Describe Wound)   [] New Altered Skin Integrity was Present on Admit and Documented in LDA   [] Wound Image Taken    Wound Care Consulted? No    Attending Nurse:  Ilana Clifton RN/Staff Member:   Damari DIOR RN

## 2024-06-13 NOTE — PROGRESS NOTES
Code Clayton called after patient was resisting staff who were attempting to help him use the urinal/bathroom safely. I responded to bedside and patient was in bed w/ staff in room; he was confused and argumentative. He had recently received some ativan.    Chart reviewed. Decision made to give 5mg IM haldol for patient safety. Will check EKG in AM to monitor QT. Contacted by RN that patient requiring wrist restraints given ongoing combativeness over cares.

## 2024-06-13 NOTE — PLAN OF CARE
11:40  Willow spoke with Peggy (admissions at [facility]) who stated Pt has a room secured but someone is currently moving out of it. They will be out by the end of the day but maintenance has to give an estimate move in time for Pt. She will call WILLOW back.

## 2024-06-13 NOTE — NURSING
Code white called for non-redirectable agitation. Attempted to help the patient urinate in the urinal with bedside sitter. Patient became agitated with yelling angrily and attempting to hit staff. Patient tried removing brace and IV.

## 2024-06-13 NOTE — NURSING
Dr. Ceja at bedside. Patient still not redirectable after PRN ativan. Patient still fighting and yelling at staff. Dr. Ceja placed order for PRN haldol.

## 2024-06-13 NOTE — PLAN OF CARE
Problem: Adult Inpatient Plan of Care  Goal: Plan of Care Review  Outcome: Progressing  Goal: Patient-Specific Goal (Individualized)  Outcome: Progressing  Goal: Absence of Hospital-Acquired Illness or Injury  Outcome: Progressing  Goal: Optimal Comfort and Wellbeing  Outcome: Progressing  Goal: Readiness for Transition of Care  Outcome: Progressing     Problem: Sepsis/Septic Shock  Goal: Optimal Coping  Outcome: Progressing  Goal: Absence of Bleeding  Outcome: Progressing  Goal: Blood Glucose Level Within Targeted Range  Outcome: Progressing  Goal: Absence of Infection Signs and Symptoms  Outcome: Progressing  Goal: Optimal Nutrition Intake  Outcome: Progressing     Problem: Pneumonia  Goal: Fluid Balance  Outcome: Progressing  Goal: Resolution of Infection Signs and Symptoms  Outcome: Progressing  Goal: Effective Oxygenation and Ventilation  Outcome: Progressing     Problem: Skin Injury Risk Increased  Goal: Skin Health and Integrity  Outcome: Progressing     Problem: Wound  Goal: Optimal Coping  Outcome: Progressing  Goal: Optimal Functional Ability  Outcome: Progressing  Goal: Absence of Infection Signs and Symptoms  Outcome: Progressing  Goal: Improved Oral Intake  Outcome: Progressing  Goal: Optimal Pain Control and Function  Outcome: Progressing  Goal: Skin Health and Integrity  Outcome: Progressing  Goal: Optimal Wound Healing  Outcome: Progressing     Problem: Fall Injury Risk  Goal: Absence of Fall and Fall-Related Injury  Outcome: Progressing     Problem: Restraint, Nonviolent  Goal: Absence of Harm or Injury  Outcome: Progressing     Problem: Restraint, Violent or Self-Destructive  Goal: Absence of Harm or Injury  Outcome: Progressing

## 2024-06-13 NOTE — PLAN OF CARE
Problem: Adult Inpatient Plan of Care  Goal: Plan of Care Review  Reactivated  Goal: Patient-Specific Goal (Individualized)  Reactivated  Goal: Absence of Hospital-Acquired Illness or Injury  Reactivated  Goal: Optimal Comfort and Wellbeing  Reactivated  Goal: Readiness for Transition of Care  Reactivated     Problem: Sepsis/Septic Shock  Goal: Optimal Coping  Reactivated  Goal: Absence of Bleeding  Reactivated  Goal: Blood Glucose Level Within Targeted Range  Reactivated  Goal: Absence of Infection Signs and Symptoms  Reactivated  Goal: Optimal Nutrition Intake  Reactivated     Problem: Pneumonia  Goal: Fluid Balance  Reactivated  Goal: Resolution of Infection Signs and Symptoms  Reactivated  Goal: Effective Oxygenation and Ventilation  Reactivated     Problem: Skin Injury Risk Increased  Goal: Skin Health and Integrity  Reactivated     Problem: Wound  Goal: Optimal Coping  Reactivated  Goal: Optimal Functional Ability  Reactivated  Goal: Absence of Infection Signs and Symptoms  Reactivated  Goal: Improved Oral Intake  Reactivated  Goal: Optimal Pain Control and Function  Reactivated  Goal: Skin Health and Integrity  Reactivated  Goal: Optimal Wound Healing  Reactivated     Problem: Fall Injury Risk  Goal: Absence of Fall and Fall-Related Injury  Reactivated     Problem: Restraint, Nonviolent  Goal: Absence of Harm or Injury  Reactivated

## 2024-06-13 NOTE — ASSESSMENT & PLAN NOTE
Requiring restraints due to behaviors and possibility of harming self or the hospital staff.  Will renew restraint order every 24 hours if need be.     No

## 2024-06-13 NOTE — SUBJECTIVE & OBJECTIVE
Past Medical History:   Diagnosis Date    BPH (benign prostatic hypertrophy)     Bradycardia     Dementia     Mobitz (type) I (Wenckebach's) atrioventricular block     Poor historian     Scrotal mass     Wears glasses        Past Surgical History:   Procedure Laterality Date    EYE SURGERY  BILAT CATARACT WITH LENS    PROSTATE SURGERY  2009    TONSILLECTOMY         Review of patient's allergies indicates:   Allergen Reactions    No known drug allergies        No current facility-administered medications on file prior to encounter.     No current outpatient medications on file prior to encounter.     Family History    None       Tobacco Use    Smoking status: Never    Smokeless tobacco: Never   Substance and Sexual Activity    Alcohol use: No    Drug use: No    Sexual activity: Not on file     Review of Systems   Unable to perform ROS: Dementia     Objective:     Vital Signs (Most Recent):  Temp: 97.9 °F (36.6 °C) (06/13/24 0701)  Pulse: 76 (06/13/24 0701)  Resp: 16 (06/13/24 0701)  BP: 135/75 (06/13/24 0701)  SpO2: 95 % (06/13/24 0701) Vital Signs (24h Range):  Temp:  [97.5 °F (36.4 °C)-97.9 °F (36.6 °C)] 97.9 °F (36.6 °C)  Pulse:  [58-91] 76  Resp:  [12-20] 16  SpO2:  [94 %-98 %] 95 %  BP: (120-176)/() 135/75     Weight: 84.8 kg (187 lb)  Body mass index is 25.36 kg/m².     Physical Exam  Constitutional:       General: He is sleeping. He is not in acute distress.     Appearance: He is not ill-appearing.      Interventions: He is restrained.   HENT:      Head: Normocephalic and atraumatic.      Right Ear: External ear normal.      Left Ear: External ear normal.   Eyes:      General: No scleral icterus.        Right eye: No discharge.         Left eye: No discharge.      Conjunctiva/sclera: Conjunctivae normal.   Neck:      Vascular: No JVD.   Cardiovascular:      Rate and Rhythm: Normal rate and regular rhythm.      Heart sounds:      No gallop.   Pulmonary:      Effort: Pulmonary effort is normal.      Breath  sounds: Normal breath sounds. No wheezing.   Chest:      Comments: Ecchymosis at PPM insertion site  Abdominal:      General: Bowel sounds are normal. There is no distension.      Palpations: Abdomen is soft. There is no mass.      Tenderness: There is no abdominal tenderness.   Musculoskeletal:         General: No deformity.      Cervical back: Neck supple.   Skin:     General: Skin is warm and dry.      Findings: No rash.   Psychiatric:      Comments: Sleeping.  Restrained.  Moving around in bed and tossing his legs over the side.                Significant Labs: All pertinent labs within the past 24 hours have been reviewed.    Significant Imaging: I have reviewed all pertinent imaging results/findings within the past 24 hours.

## 2024-06-13 NOTE — H&P
Formerly [facility] Medicine  History & Physical    Patient Name: Elpidio Ayala  MRN: [id number]  Patient Class: OP- Outpatient Recovery  Admission Date: 6/12/2024  Attending Physician: Keyon Joseph MD   Primary Care Provider: Laurel, Primary Doctor         Patient information was obtained from past medical records and cardiologist .     Subjective:     Principal Problem:Dementia with behavioral disturbance    Chief Complaint: No chief complaint on file.       HPI: Mr. Ayala was brought in for a planned pacemaker insertion with Dr. Jimenez due to type 2 AV block.  He underwent the procedure yesterday and did well, but afterward he developed confusion and agitation, becoming combative with the staff.  Lorazepam was administered, which helped to calm him down somewhat.  The cardiologist contacted me for assistance in managing the agitation.  I ordered a dose of Seroquel to be given last night, and the patient did receive it.  However, last night he became combative again while being assisted in the restroom.  This morning he is in restraints, and there is a sitter at the bedside.  Patient has no further cardiac problems that need to be acutely addressed; therefore, the patient is transferred to my hospitalist service.  Medical history includes dementia, unknown type.    Past Medical History:   Diagnosis Date    BPH (benign prostatic hypertrophy)     Bradycardia     Dementia     Mobitz (type) I (Wenckebach's) atrioventricular block     Poor historian     Scrotal mass     Wears glasses        Past Surgical History:   Procedure Laterality Date    EYE SURGERY  BILAT CATARACT WITH LENS    PROSTATE SURGERY  2009    TONSILLECTOMY         Review of patient's allergies indicates:   Allergen Reactions    No known drug allergies        No current facility-administered medications on file prior to encounter.     No current outpatient medications on file prior to encounter.     Family History    None        Tobacco Use    Smoking status: Never    Smokeless tobacco: Never   Substance and Sexual Activity    Alcohol use: No    Drug use: No    Sexual activity: Not on file     Review of Systems   Unable to perform ROS: Dementia     Objective:     Vital Signs (Most Recent):  Temp: 97.9 °F (36.6 °C) (06/13/24 0701)  Pulse: 76 (06/13/24 0701)  Resp: 16 (06/13/24 0701)  BP: 135/75 (06/13/24 0701)  SpO2: 95 % (06/13/24 0701) Vital Signs (24h Range):  Temp:  [97.5 °F (36.4 °C)-97.9 °F (36.6 °C)] 97.9 °F (36.6 °C)  Pulse:  [58-91] 76  Resp:  [12-20] 16  SpO2:  [94 %-98 %] 95 %  BP: (120-176)/() 135/75     Weight: 84.8 kg (187 lb)  Body mass index is 25.36 kg/m².     Physical Exam  Constitutional:       General: He is sleeping. He is not in acute distress.     Appearance: He is not ill-appearing.      Interventions: He is restrained.   HENT:      Head: Normocephalic and atraumatic.      Right Ear: External ear normal.      Left Ear: External ear normal.   Eyes:      General: No scleral icterus.        Right eye: No discharge.         Left eye: No discharge.      Conjunctiva/sclera: Conjunctivae normal.   Neck:      Vascular: No JVD.   Cardiovascular:      Rate and Rhythm: Normal rate and regular rhythm.      Heart sounds:      No gallop.   Pulmonary:      Effort: Pulmonary effort is normal.      Breath sounds: Normal breath sounds. No wheezing.   Chest:      Comments: Ecchymosis at PPM insertion site  Abdominal:      General: Bowel sounds are normal. There is no distension.      Palpations: Abdomen is soft. There is no mass.      Tenderness: There is no abdominal tenderness.   Musculoskeletal:         General: No deformity.      Cervical back: Neck supple.   Skin:     General: Skin is warm and dry.      Findings: No rash.   Psychiatric:      Comments: Sleeping.  Restrained.  Moving around in bed and tossing his legs over the side.                Significant Labs: All pertinent labs within the past 24 hours have been  reviewed.    Significant Imaging: I have reviewed all pertinent imaging results/findings within the past 24 hours.  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 Dementia is mild. The patient does have signs of behavioral disturbance.  Continue non-pharmacologic interventions to prevent delirium (No VS between 11PM-5AM, activity during day, opening blinds, providing glasses/hearing aids, and up in chair during daytime). Will avoid narcotics and benzos unless absolutely necessary. PRN anti-psychotics are ordered to avoid self harm behaviors.  Will continue 25 mg Seroquel in the evenings.  Also Haldol 2 mg IM as needed for acute non-redirectable agitation.    Requires restraints for behavioral reasons  Requiring restraints due to behaviors and possibility of harming self or the hospital staff.  Will renew restraint order every 24 hours if need be.      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  No rhythm issues at this time.        VTE Risk Mitigation (From admission, onward)      None                       Code White called after patient was resisting staff who were attempting to help him use the urinal/bathroom safely. I responded to bedside and patient was in bed w/ staff in room; he was confused and argumentative. He had recently received some ativan.    Chart reviewed. Decision made to give 5mg IM haldol for patient safety. Will check EKG in AM to monitor QT. Contacted by RN that patient requiring wrist restraints given ongoing combativeness over cares.    Keyon Joseph MD  Department of Hospital Medicine  [facility]

## 2024-06-14 LAB
ABO + RH BLD: NORMAL
ANION GAP SERPL CALC-SCNC: 7 MMOL/L (ref 8–16)
BASOPHILS # BLD AUTO: 0.04 K/UL (ref 0–0.2)
BASOPHILS NFR BLD: 0.5 % (ref 0–1.9)
BLD GP AB SCN CELLS X3 SERPL QL: NORMAL
BUN SERPL-MCNC: 19 MG/DL (ref 8–23)
CALCIUM SERPL-MCNC: 8.7 MG/DL (ref 8.7–10.5)
CHLORIDE SERPL-SCNC: 105 MMOL/L (ref 95–110)
CO2 SERPL-SCNC: 24 MMOL/L (ref 23–29)
CREAT SERPL-MCNC: 0.7 MG/DL (ref 0.5–1.4)
DIFFERENTIAL METHOD BLD: ABNORMAL
EOSINOPHIL # BLD AUTO: 0.2 K/UL (ref 0–0.5)
EOSINOPHIL NFR BLD: 2.3 % (ref 0–8)
ERYTHROCYTE [DISTWIDTH] IN BLOOD BY AUTOMATED COUNT: 14.3 % (ref 11.5–14.5)
EST. GFR  (NO RACE VARIABLE): >60 ML/MIN/1.73 M^2
GLUCOSE SERPL-MCNC: 83 MG/DL (ref 70–110)
HCT VFR BLD AUTO: 45.4 % (ref 40–54)
HGB BLD-MCNC: 15 G/DL (ref 14–18)
IMM GRANULOCYTES # BLD AUTO: 0.03 K/UL (ref 0–0.04)
IMM GRANULOCYTES NFR BLD AUTO: 0.4 % (ref 0–0.5)
LYMPHOCYTES # BLD AUTO: 1.7 K/UL (ref 1–4.8)
LYMPHOCYTES NFR BLD: 22.6 % (ref 18–48)
MCH RBC QN AUTO: 33 PG (ref 27–31)
MCHC RBC AUTO-ENTMCNC: 33 G/DL (ref 32–36)
MCV RBC AUTO: 100 FL (ref 82–98)
MONOCYTES # BLD AUTO: 0.8 K/UL (ref 0.3–1)
MONOCYTES NFR BLD: 11.4 % (ref 4–15)
NEUTROPHILS # BLD AUTO: 4.6 K/UL (ref 1.8–7.7)
NEUTROPHILS NFR BLD: 62.8 % (ref 38–73)
NRBC BLD-RTO: 0 /100 WBC
PLATELET # BLD AUTO: 151 K/UL (ref 150–450)
PMV BLD AUTO: 10.4 FL (ref 9.2–12.9)
POTASSIUM SERPL-SCNC: 4.2 MMOL/L (ref 3.5–5.1)
RBC # BLD AUTO: 4.55 M/UL (ref 4.6–6.2)
SODIUM SERPL-SCNC: 136 MMOL/L (ref 136–145)
SPECIMEN OUTDATE: NORMAL
WBC # BLD AUTO: 7.34 K/UL (ref 3.9–12.7)

## 2024-06-14 PROCEDURE — 84443 ASSAY THYROID STIM HORMONE: CPT | Performed by: HOSPITALIST

## 2024-06-14 PROCEDURE — 36415 COLL VENOUS BLD VENIPUNCTURE: CPT | Performed by: INTERNAL MEDICINE

## 2024-06-14 PROCEDURE — 83036 HEMOGLOBIN GLYCOSYLATED A1C: CPT | Performed by: HOSPITALIST

## 2024-06-14 PROCEDURE — 85025 COMPLETE CBC W/AUTO DIFF WBC: CPT | Performed by: INTERNAL MEDICINE

## 2024-06-14 PROCEDURE — 86900 BLOOD TYPING SEROLOGIC ABO: CPT | Performed by: INTERNAL MEDICINE

## 2024-06-14 PROCEDURE — 99499 UNLISTED E&M SERVICE: CPT | Mod: ,,, | Performed by: INTERNAL MEDICINE

## 2024-06-14 PROCEDURE — 63600175 PHARM REV CODE 636 W HCPCS: Performed by: HOSPITALIST

## 2024-06-14 PROCEDURE — 80048 BASIC METABOLIC PNL TOTAL CA: CPT | Performed by: INTERNAL MEDICINE

## 2024-06-14 PROCEDURE — 25000003 PHARM REV CODE 250: Performed by: HOSPITALIST

## 2024-06-14 PROCEDURE — 86850 RBC ANTIBODY SCREEN: CPT | Performed by: INTERNAL MEDICINE

## 2024-06-14 PROCEDURE — 21400001 HC TELEMETRY ROOM

## 2024-06-14 RX ADMIN — QUETIAPINE 25 MG: 25 TABLET ORAL at 07:06

## 2024-06-14 RX ADMIN — HALOPERIDOL LACTATE 2 MG: 5 INJECTION, SOLUTION INTRAMUSCULAR at 07:06

## 2024-06-14 NOTE — CARE UPDATE
The chest x-ray findings were review.  The threshold for the ventricular pacing were within normal limits.  R-waves were 7 pacing to 0.8 mV.  Since the pacing thresholds were within normal limits will not try to reposition the lead at present.  Will interrogate again on Monday and see if there is adequate thresholds.  The family members were notify as well as the medicine team.

## 2024-06-14 NOTE — ASSESSMENT & PLAN NOTE
Underwent pacemaker placement 6/12.  Chest x-ray today shows lead has, replace.  Procedure planned for 3:00 p.m. today to fix.  Discussed with Cardiology

## 2024-06-14 NOTE — PLAN OF CARE
Problem: Adult Inpatient Plan of Care  Goal: Plan of Care Review  Outcome: Progressing  Goal: Optimal Comfort and Wellbeing  Outcome: Progressing     Problem: Skin Injury Risk Increased  Goal: Skin Health and Integrity  Outcome: Progressing     Problem: Wound  Goal: Optimal Coping  Outcome: Progressing     Problem: Fall Injury Risk  Goal: Absence of Fall and Fall-Related Injury  Outcome: Progressing     Problem: Restraint, Nonviolent  Goal: Absence of Harm or Injury  Outcome: Progressing

## 2024-06-14 NOTE — NURSING
Nurses Note -- 4 Eyes      6/14/2024   1:48 AM      Skin assessed during: Q Shift Change      [] No Altered Skin Integrity Present    []Prevention Measures Documented      [x] Yes- Altered Skin Integrity Present or Discovered   [x] LDA Added if Not in Epic (Describe Wound)   [x] New Altered Skin Integrity was Present on Admit and Documented in LDA   [x] Wound Image Taken    Wound Care Consulted? Yes    Attending Nurse:  Elizabeth Clifton RN/Staff Member:   Leta Viera

## 2024-06-14 NOTE — PROGRESS NOTES
[facility] Medicine  Progress Note    Patient Name: Elpidio Ayala  MRN: [id number]  Patient Class: IP- Inpatient   Admission Date: 6/12/2024  Length of Stay: 0 days  Attending Physician: Leta Lott MD  Primary Care Provider: Laurel, Primary Doctor        Subjective:     Principal Problem:Dementia with behavioral disturbance        HPI:  Mr. Ayala was brought in for a planned pacemaker insertion with Dr. Jimenez due to type 2 AV block.  He underwent the procedure yesterday and did well, but afterward he developed confusion and agitation, becoming combative with the staff.  Lorazepam was administered, which helped to calm him down somewhat.  The cardiologist contacted me for assistance in managing the agitation.  I ordered a dose of Seroquel to be given last night, and the patient did receive it.  However, last night he became combative again while being assisted in the restroom.  This morning he is in restraints, and there is a sitter at the bedside.  Patient has no further cardiac problems that need to be acutely addressed; therefore, the patient is transferred to my hospitalist service.  Medical history includes dementia, unknown type.    Overview/Hospital Course:  No notes on file    Interval History:  Patient seen and examined.  Sitter and brother at bedside.  Dr. Jimenez making his rounds at the same time.  Chest x-ray was obtained as there was concern pacemaker lead had moved out of place and the chest x-ray confirmed this.  Discussed with Dr. Victor 8, make patient NPO and we will do procedure at 3.  No plan for discharge today.    Review of Systems   Unable to perform ROS: Dementia     Objective:     Vital Signs (Most Recent):  Temp: 97.6 °F (36.4 °C) (06/14/24 1105)  Pulse: 87 (06/14/24 1105)  Resp: 20 (06/14/24 1105)  BP: (!) 96/57 (06/14/24 1105)  SpO2: 95 % (06/14/24 1105) Vital Signs (24h Range):  Temp:  [97.2 °F (36.2 °C)-98.3 °F (36.8 °C)] 97.6 °F (36.4 °C)  Pulse:   [] 87  Resp:  [16-20] 20  SpO2:  [93 %-97 %] 95 %  BP: ()/(57-94) 96/57     Weight: 84.8 kg (187 lb)  Body mass index is 25.36 kg/m².    Intake/Output Summary (Last 24 hours) at 6/14/2024 1117  Last data filed at 6/14/2024 1053  Gross per 24 hour   Intake 800 ml   Output 350 ml   Net 450 ml         Physical Exam  Constitutional:       General: He is sleeping. He is not in acute distress.     Appearance: He is not ill-appearing.      Interventions: He is restrained.   HENT:      Head: Normocephalic and atraumatic.      Right Ear: External ear normal.      Left Ear: External ear normal.   Eyes:      General: No scleral icterus.        Right eye: No discharge.         Left eye: No discharge.      Conjunctiva/sclera: Conjunctivae normal.   Neck:      Vascular: No JVD.   Cardiovascular:      Rate and Rhythm: Normal rate and regular rhythm.      Heart sounds:      No gallop.   Pulmonary:      Effort: Pulmonary effort is normal.      Breath sounds: Normal breath sounds. No wheezing.   Chest:      Comments: Ecchymosis at PPM insertion site  Abdominal:      General: Bowel sounds are normal. There is no distension.      Palpations: Abdomen is soft. There is no mass.      Tenderness: There is no abdominal tenderness.   Musculoskeletal:         General: No deformity.      Cervical back: Neck supple.   Skin:     General: Skin is warm and dry.      Findings: No rash.   Psychiatric:      Comments: Calm.  Not restrained.             Significant Labs: All pertinent labs within the past 24 hours have been reviewed.    Significant Imaging: I have reviewed all pertinent imaging results/findings within the past 24 hours.    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 Dementia is mild. The patient does have signs of behavioral disturbance.  Continue non-pharmacologic interventions to prevent delirium (No VS between 11PM-5AM, activity during  day, opening blinds, providing glasses/hearing aids, and up in chair during daytime). Will avoid narcotics and benzos unless absolutely necessary. PRN anti-psychotics are ordered to avoid self harm behaviors.  Will continue 25 mg Seroquel in the evenings.  Also Haldol 2 mg IM as needed for acute non-redirectable agitation.    Requires restraints for behavioral reasons  Not requiring restraints at this time.      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  Chest x-ray today shows lead has, replace.  Procedure planned for 3:00 p.m. today to fix.  Discussed with Cardiology        VTE Risk Mitigation (From admission, onward)      None            Discharge Planning   MARINA: 6/14/2024     Code Status: Full Code   Is the patient medically ready for discharge?:     Reason for patient still in hospital (select all that apply): Patient trending condition and Treatment  Discharge Plan A: Skilled Nursing Facility                  Leta Lott MD  Department of Hospital Medicine   [facility]

## 2024-06-14 NOTE — SUBJECTIVE & OBJECTIVE
Interval History:  Patient seen and examined.  Sitter and brother at bedside.  Dr. Jimenez making his rounds at the same time.  Chest x-ray was obtained as there was concern pacemaker lead had moved out of place and the chest x-ray confirmed this.  Discussed with Dr. Victor 8, make patient NPO and we will do procedure at 3.  No plan for discharge today.    Review of Systems   Unable to perform ROS: Dementia     Objective:     Vital Signs (Most Recent):  Temp: 97.6 °F (36.4 °C) (06/14/24 1105)  Pulse: 87 (06/14/24 1105)  Resp: 20 (06/14/24 1105)  BP: (!) 96/57 (06/14/24 1105)  SpO2: 95 % (06/14/24 1105) Vital Signs (24h Range):  Temp:  [97.2 °F (36.2 °C)-98.3 °F (36.8 °C)] 97.6 °F (36.4 °C)  Pulse:  [] 87  Resp:  [16-20] 20  SpO2:  [93 %-97 %] 95 %  BP: ()/(57-94) 96/57     Weight: 84.8 kg (187 lb)  Body mass index is 25.36 kg/m².    Intake/Output Summary (Last 24 hours) at 6/14/2024 1117  Last data filed at 6/14/2024 1053  Gross per 24 hour   Intake 800 ml   Output 350 ml   Net 450 ml         Physical Exam  Constitutional:       General: He is sleeping. He is not in acute distress.     Appearance: He is not ill-appearing.      Interventions: He is restrained.   HENT:      Head: Normocephalic and atraumatic.      Right Ear: External ear normal.      Left Ear: External ear normal.   Eyes:      General: No scleral icterus.        Right eye: No discharge.         Left eye: No discharge.      Conjunctiva/sclera: Conjunctivae normal.   Neck:      Vascular: No JVD.   Cardiovascular:      Rate and Rhythm: Normal rate and regular rhythm.      Heart sounds:      No gallop.   Pulmonary:      Effort: Pulmonary effort is normal.      Breath sounds: Normal breath sounds. No wheezing.   Chest:      Comments: Ecchymosis at PPM insertion site  Abdominal:      General: Bowel sounds are normal. There is no distension.      Palpations: Abdomen is soft. There is no mass.      Tenderness: There is no abdominal tenderness.    Musculoskeletal:         General: No deformity.      Cervical back: Neck supple.   Skin:     General: Skin is warm and dry.      Findings: No rash.   Psychiatric:      Comments: Calm.  Not restrained.             Significant Labs: All pertinent labs within the past 24 hours have been reviewed.    Significant Imaging: I have reviewed all pertinent imaging results/findings within the past 24 hours.

## 2024-06-14 NOTE — PROGRESS NOTES
Formerly [facility]  Cardiology  Progress Note    Patient Name: Elpidio Ayala  MRN: [id number]  Admission Date: 6/12/2024  Hospital Length of Stay: 0 days  Code Status: Full Code   Attending Physician: Leta Lott MD   Primary Care Physician: Laurel Primary Doctor  Expected Discharge Date: 6/14/2024  Principal Problem:Dementia with behavioral disturbance    Subjective:     Hospital Course:  The patient had a pacemaker implanted 2 days ago.  During the night he became combative and confused.  The patient received IV Ativan the medicine team was consulted.  He was started on Seroquel.  He had some improvement although he continue to be very combative that require restriction.  There was some ecchymosis at the pacemaker site.  Today it was noted that the functioning of the device was not adequate.  A chest x-ray has been performed that showed movement of the ventricular lead..  This finding has been discussed with the medicine team as well as the patient and his brother.  He continues to be demented with confusion.    Interval History:  Dislodgement of the ventricular lead    ROS unable to obtain secondary to confusion  Objective:     Vital Signs (Most Recent):  Temp: 97.6 °F (36.4 °C) (06/14/24 1105)  Pulse: 87 (06/14/24 1105)  Resp: 20 (06/14/24 1105)  BP: (!) 96/57 (06/14/24 1105)  SpO2: 95 % (06/14/24 1105) Vital Signs (24h Range):  Temp:  [97.2 °F (36.2 °C)-98.3 °F (36.8 °C)] 97.6 °F (36.4 °C)  Pulse:  [] 87  Resp:  [16-20] 20  SpO2:  [93 %-97 %] 95 %  BP: ()/(57-94) 96/57     Weight: 84.8 kg (187 lb)  Body mass index is 25.36 kg/m².    SpO2: 95 %         Intake/Output Summary (Last 24 hours) at 6/14/2024 1121  Last data filed at 6/14/2024 1053  Gross per 24 hour   Intake 800 ml   Output 350 ml   Net 450 ml       Lines/Drains/Airways       Peripheral Intravenous Line  Duration                  Peripheral IV - Single Lumen 06/12/24 0600 20 G Distal;Right;Posterior Forearm 2 days          "Peripheral IV - Single Lumen 06/12/24 0600 20 G Left;Anterior Antecubital 2 days                    Physical Exam  Confused gentleman in not acute distress his extra ocular movement are intact JVDs are normal the pacemaker site there is ecchymosis.  The lungs are clear anterior and posterior heart regular rate and rhythm.  The abdomen is soft nontender  Significant Labs: ABG: No results for input(s): "PH", "PCO2", "HCO3", "POCSATURATED", "BE" in the last 48 hours., BMP: No results for input(s): "GLU", "NA", "K", "CL", "CO2", "BUN", "CREATININE", "CALCIUM", "MG" in the last 48 hours., CMP No results for input(s): "NA", "K", "CL", "CO2", "GLU", "BUN", "CREATININE", "CALCIUM", "PROT", "ALBUMIN", "BILITOT", "ALKPHOS", "AST", "ALT", "ANIONGAP", "ESTGFRAFRICA", "EGFRNONAA" in the last 48 hours., CBC No results for input(s): "WBC", "HGB", "HCT", "PLT" in the last 48 hours., INR No results for input(s): "INR", "PROTIME" in the last 48 hours., and Lipid Panel No results for input(s): "CHOL", "HDL", "LDLCALC", "TRIG", "CHOLHDL" in the last 48 hours.    Significant Imaging: X-Ray: CXR: X-Ray Chest 1 View (CXR):   Results for orders placed or performed during the hospital encounter of 06/12/24   X-Ray Chest 1 View    Narrative    EXAMINATION:  XR CHEST 1 VIEW    CLINICAL HISTORY:  Pacemaker placement    FINDINGS:  Portable chest radiograph at 10:14 hours compared to 06/12/2024 11:30 hours shows right subclavian dual lead CCD, with the atrial lead tip unchanged in position.  The ventricular pacing lead has changed position from the prior radiograph, with wire coursing cranially through the right ventricle and the distal tip pointing caudally, compatible displacement of ventricular tip from the right ventricular wall.    The cardiomediastinal silhouette and pulmonary vasculature are stable and within normal limits.  There is unchanged asymmetric elevation of the right hemidiaphragm, with no new pleural or parenchymal abnormality. "  No pneumothorax.      Impression    Displacement of the ventricular lead of the cardiac conduction device, as described.  Findings were discussed with Dr. Jimenez.      Electronically signed by: Elias Alfaro  Date:    06/14/2024  Time:    10:30     Assessment and Plan:     Brief HPI:  89-year-old male with history of dementia he was complaining of generalized weakness and Zio was performed that showed AV block.  The patient was seen in the office the findings were discussed with him and his brother the procedure risks and benefits was explained to the patient he was in agreement to proceed with a DDD pacemaker.    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

## 2024-06-14 NOTE — PLAN OF CARE
SW called Peggy [facility] [phone], she stated she will have a bed available Tuesday 06/18/2024. Peggy asks that new med list, H&P, and orders be sent to them upon d/c. Peggy also asked that if there were any OTC needs such as Tylenol, Pepcid, etc, it be on a RX script.         06/14/24 1417   Post-Acute Status   Post-Acute Authorization Placement   Post-Acute Placement Status Pending medical clearance/testing   Coverage MEDICARE - MEDICARE RAILROAD custodial   Discharge Plan   Discharge Plan A Assisted Living

## 2024-06-14 NOTE — HOSPITAL COURSE
Patient was admitted to the hospital medicine service after agitation noted after planned pacemaker insertion with Dr. Jimenez due to type 2 av block.  He has a history of dementia and developed confusion and agitation after the procedure.  He was started on scheduled quetiapine and p.r.n. haloperidol.  Behavior improved.  On 06/14 there was concern that a pacemaker lead had moved and Dr. Jimenez consider taking him back for procedure to correct however this was held off as later the leads appeared to be functioning appropriately.  Cardiology has monitored patient over the weekend and rechecked leads on Monday.  Patient was started on carvedilol BID for rate control.    Patient was discharged with following instructions.  Follow up with pcp 1-2 weeks  Follow up with cardiology 1-2 weeks

## 2024-06-15 LAB
ESTIMATED AVG GLUCOSE: 105 MG/DL (ref 68–131)
HBA1C MFR BLD: 5.3 % (ref 4.5–6.2)
TSH SERPL DL<=0.005 MIU/L-ACNC: 3.23 UIU/ML (ref 0.34–5.6)

## 2024-06-15 PROCEDURE — 25000003 PHARM REV CODE 250: Performed by: FAMILY MEDICINE

## 2024-06-15 PROCEDURE — 63600175 PHARM REV CODE 636 W HCPCS: Performed by: FAMILY MEDICINE

## 2024-06-15 PROCEDURE — 36415 COLL VENOUS BLD VENIPUNCTURE: CPT | Performed by: HOSPITALIST

## 2024-06-15 PROCEDURE — 21400001 HC TELEMETRY ROOM

## 2024-06-15 PROCEDURE — 63600175 PHARM REV CODE 636 W HCPCS: Performed by: HOSPITALIST

## 2024-06-15 RX ORDER — HEPARIN SODIUM 5000 [USP'U]/ML
5000 INJECTION, SOLUTION INTRAVENOUS; SUBCUTANEOUS EVERY 12 HOURS
Status: DISCONTINUED | OUTPATIENT
Start: 2024-06-15 | End: 2024-06-18 | Stop reason: HOSPADM

## 2024-06-15 RX ORDER — LORAZEPAM 1 MG/1
1 TABLET ORAL ONCE
Status: DISCONTINUED | OUTPATIENT
Start: 2024-06-15 | End: 2024-06-15

## 2024-06-15 RX ORDER — QUETIAPINE FUMARATE 25 MG/1
50 TABLET, FILM COATED ORAL
Status: DISCONTINUED | OUTPATIENT
Start: 2024-06-15 | End: 2024-06-18 | Stop reason: HOSPADM

## 2024-06-15 RX ORDER — LORAZEPAM 2 MG/ML
1 INJECTION INTRAMUSCULAR ONCE
Status: COMPLETED | OUTPATIENT
Start: 2024-06-15 | End: 2024-06-15

## 2024-06-15 RX ORDER — HALOPERIDOL 5 MG/ML
2 INJECTION INTRAMUSCULAR EVERY 4 HOURS PRN
Status: DISCONTINUED | OUTPATIENT
Start: 2024-06-15 | End: 2024-06-15

## 2024-06-15 RX ORDER — HALOPERIDOL 5 MG/ML
5 INJECTION INTRAMUSCULAR EVERY 6 HOURS PRN
Status: DISCONTINUED | OUTPATIENT
Start: 2024-06-15 | End: 2024-06-18 | Stop reason: HOSPADM

## 2024-06-15 RX ADMIN — LORAZEPAM 1 MG: 2 INJECTION INTRAMUSCULAR; INTRAVENOUS at 05:06

## 2024-06-15 RX ADMIN — HALOPERIDOL LACTATE 2 MG: 5 INJECTION, SOLUTION INTRAMUSCULAR at 01:06

## 2024-06-15 RX ADMIN — QUETIAPINE 50 MG: 25 TABLET ORAL at 08:06

## 2024-06-15 RX ADMIN — HEPARIN SODIUM 5000 UNITS: 5000 INJECTION INTRAVENOUS; SUBCUTANEOUS at 08:06

## 2024-06-15 RX ADMIN — HALOPERIDOL LACTATE 2 MG: 5 INJECTION, SOLUTION INTRAMUSCULAR at 04:06

## 2024-06-15 RX ADMIN — HALOPERIDOL LACTATE 5 MG: 5 INJECTION, SOLUTION INTRAMUSCULAR at 04:06

## 2024-06-15 RX ADMIN — HALOPERIDOL LACTATE 2 MG: 5 INJECTION, SOLUTION INTRAMUSCULAR at 11:06

## 2024-06-15 NOTE — PROGRESS NOTES
[facility]  Progress Note    Patient Name: Elpidio Ayala  MRN: [id number]  Patient Class: IP- Inpatient   Admission Date: 6/12/2024  Length of Stay: 1 days  Attending Physician: Edgardo Dorman DO  Primary Care Provider: Laurel, Primary Doctor        Subjective:     Principal Problem:Dementia with behavioral disturbance        HPI:  Mr. Ayala was brought in for a planned pacemaker insertion with Dr. Jimenez due to type 2 AV block.  He underwent the procedure yesterday and did well, but afterward he developed confusion and agitation, becoming combative with the staff.  Lorazepam was administered, which helped to calm him down somewhat.  The cardiologist contacted me for assistance in managing the agitation.  I ordered a dose of Seroquel to be given last night, and the patient did receive it.  However, last night he became combative again while being assisted in the restroom.  This morning he is in restraints, and there is a sitter at the bedside.  Patient has no further cardiac problems that need to be acutely addressed; therefore, the patient is transferred to my hospitalist service.  Medical history includes dementia, unknown type.    Overview/Hospital Course:  Patient was admitted to the hospital medicine service after agitation noted after planned pacemaker insertion with Dr. Jimenez due to type 2 av block.  He has a history of dementia and developed confusion and agitation after the procedure.  He was started on scheduled quetiapine and p.r.n. haloperidol.  Behavior improved.  On 06/14 there was concern that a pacemaker lead had moved and Dr. Jimenez consider taking him back for procedure to correct however this was held off as later the leads appeared to be functioning appropriately.  Cardiology has monitored patient over the weekend and rechecked leads on Monday.    No new subjective & objective note has been filed under this hospital service since the last note was  generated.      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 Dementia is mild. The patient does have signs of behavioral disturbance.  Continue non-pharmacologic interventions to prevent delirium (No VS between 11PM-5AM, activity during day, opening blinds, providing glasses/hearing aids, and up in chair during daytime). Will avoid narcotics and benzos unless absolutely necessary. PRN anti-psychotics are ordered to avoid self harm behaviors.  Increased 50 mg quetiapine in the evenings.  Also Haldol IM as needed for acute non-redirectable agitation.    Requires restraints for behavioral reasons  Not requiring restraints at this time.      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  Cardiology wanted to monitor patient over the weekend and rechecked leads on Monday.         VTE Risk Mitigation (From admission, onward)      None            Discharge Planning   MARINA: 6/18/2024     Code Status: Full Code   Is the patient medically ready for discharge?:     Reason for patient still in hospital (select all that apply): Patient trending condition  Discharge Plan A: Return to nursing home                  Edgardo Dorman DO  Department of Hospital Medicine   [facility]

## 2024-06-15 NOTE — ASSESSMENT & PLAN NOTE
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 Dementia is mild. The patient does have signs of behavioral disturbance.  Continue non-pharmacologic interventions to prevent delirium (No VS between 11PM-5AM, activity during day, opening blinds, providing glasses/hearing aids, and up in chair during daytime). Will avoid narcotics and benzos unless absolutely necessary. PRN anti-psychotics are ordered to avoid self harm behaviors.  Increased 50 mg quetiapine in the evenings.  Also Haldol IM as needed for acute non-redirectable agitation.

## 2024-06-15 NOTE — ASSESSMENT & PLAN NOTE
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 Dementia is mild. The patient does have signs of behavioral disturbance.  Continue non-pharmacologic interventions to prevent delirium (No VS between 11PM-5AM, activity during day, opening blinds, providing glasses/hearing aids, and up in chair during daytime). Will avoid narcotics and benzos unless absolutely necessary. PRN anti-psychotics are ordered to avoid self harm behaviors.  Increased 50 mg quetiapine in the evenings.  Also Haldol 2 mg IM as needed for acute non-redirectable agitation.

## 2024-06-15 NOTE — ASSESSMENT & PLAN NOTE
Underwent pacemaker placement 6/12.  Cardiology wanted to monitor patient over the weekend and rechecked leads on Monday.

## 2024-06-15 NOTE — NURSING
Pt with confusion and some agitation, pt was in bed and then got out of bed and ambulated in room, pt has unsteady. Attempted to redirect pt back to bed but pt was agitated and refused, pt ambulated to bathroom and to window and then to entrance to room and was standing in hallway, pt refused to return to bed as asked by staff and sitter. Pt needed redirecting to return to room and was again agitated and has flat affect. Redirected back to room. Will continue to monitor pt.

## 2024-06-15 NOTE — SUBJECTIVE & OBJECTIVE
Interval History:  Patient said he is not entirely sure where he is however he recognizes his brother.  He had big breakfast and he does not feel like eating lunch    Review of Systems   All other systems reviewed and are negative.    Objective:     Vital Signs (Most Recent):  Temp: 98.9 °F (37.2 °C) (06/15/24 1542)  Pulse: 96 (06/15/24 1542)  Resp: 20 (06/15/24 1542)  BP: (!) 169/79 (06/15/24 1542)  SpO2: 97 % (06/15/24 1542) Vital Signs (24h Range):  Temp:  [96 °F (35.6 °C)-98.9 °F (37.2 °C)] 98.9 °F (37.2 °C)  Pulse:  [] 96  Resp:  [20] 20  SpO2:  [95 %-97 %] 97 %  BP: (100-169)/(58-82) 169/79     Weight: 84.8 kg (187 lb)  Body mass index is 25.36 kg/m².    Intake/Output Summary (Last 24 hours) at 6/15/2024 1549  Last data filed at 6/15/2024 1307  Gross per 24 hour   Intake 0 ml   Output 325 ml   Net -325 ml         Physical Exam  Constitutional:       Appearance: Normal appearance.   Cardiovascular:      Rate and Rhythm: Normal rate and regular rhythm.   Pulmonary:      Effort: Pulmonary effort is normal.   Musculoskeletal:         General: No swelling.   Neurological:      Mental Status: Mental status is at baseline.             Significant Labs: All pertinent labs within the past 24 hours have been reviewed.    Significant Imaging: I have reviewed all pertinent imaging results/findings within the past 24 hours.

## 2024-06-15 NOTE — NURSING
Nurses Note -- 4 Eyes      6/14/2024   10:11 PM      Skin assessed during: Daily Assessment      [] No Altered Skin Integrity Present    []Prevention Measures Documented      [x] Yes- Altered Skin Integrity Present or Discovered   [] LDA Added if Not in Epic (Describe Wound)   [] New Altered Skin Integrity was Present on Admit and Documented in LDA   [] Wound Image Taken    Wound Care Consulted? Yes    Attending Nurse:  mary Clifton RN/Staff Member:   gavin

## 2024-06-15 NOTE — NURSING
Pt up in room and attempting to walk around in hallway, has confusion  and needs redirection medicated per MD orders will continue to monitor pt

## 2024-06-16 LAB
OHS CV EVENT MONITOR DAY: 53
OHS CV HOLTER HOOKUP DATE: NORMAL
OHS CV HOLTER HOOKUP TIME: NORMAL
OHS CV HOLTER LENGTH DECIMAL HOURS: 1278
OHS CV HOLTER LENGTH HOURS: 6
OHS CV HOLTER LENGTH MINUTES: 0
OHS CV HOLTER SCAN DATE: NORMAL
OHS CV HOLTER SINUS AVERAGE HR: 67 BPM
OHS CV HOLTER SINUS MAX HR: 245 BPM
OHS CV HOLTER SINUS MIN HR: 32 BPM
OHS CV HOLTER STUDY END DATE: NORMAL
OHS CV HOLTER STUDY END TIME: NORMAL

## 2024-06-16 PROCEDURE — 21400001 HC TELEMETRY ROOM

## 2024-06-16 PROCEDURE — 63600175 PHARM REV CODE 636 W HCPCS: Performed by: FAMILY MEDICINE

## 2024-06-16 PROCEDURE — 25000003 PHARM REV CODE 250: Performed by: FAMILY MEDICINE

## 2024-06-16 RX ADMIN — HEPARIN SODIUM 5000 UNITS: 5000 INJECTION INTRAVENOUS; SUBCUTANEOUS at 08:06

## 2024-06-16 RX ADMIN — HALOPERIDOL LACTATE 5 MG: 5 INJECTION, SOLUTION INTRAMUSCULAR at 05:06

## 2024-06-16 RX ADMIN — QUETIAPINE 50 MG: 25 TABLET ORAL at 07:06

## 2024-06-16 RX ADMIN — HEPARIN SODIUM 5000 UNITS: 5000 INJECTION INTRAVENOUS; SUBCUTANEOUS at 09:06

## 2024-06-16 NOTE — SUBJECTIVE & OBJECTIVE
Interval History: Patient said he is tired and went back to sleep      Objective:     Vital Signs (Most Recent):  Temp: 97.7 °F (36.5 °C) (06/16/24 1315)  Pulse: 84 (06/16/24 1045)  Resp: 19 (06/16/24 1045)  BP: 100/60 (06/16/24 1045)  SpO2: 96 % (06/16/24 1045) Vital Signs (24h Range):  Temp:  [96.5 °F (35.8 °C)-98.9 °F (37.2 °C)] 97.7 °F (36.5 °C)  Pulse:  [82-96] 84  Resp:  [18-20] 19  SpO2:  [95 %-97 %] 96 %  BP: (100-169)/(60-89) 100/60     Weight: 84.8 kg (187 lb)  Body mass index is 25.36 kg/m².    Intake/Output Summary (Last 24 hours) at 6/16/2024 1340  Last data filed at 6/16/2024 1327  Gross per 24 hour   Intake 480 ml   Output 475 ml   Net 5 ml         Physical Exam  Cardiovascular:      Pulses: Normal pulses.      Heart sounds: Normal heart sounds.   Pulmonary:      Effort: Pulmonary effort is normal.      Breath sounds: Normal breath sounds.   Abdominal:      General: Abdomen is flat.   Skin:     General: Skin is warm and dry.             Significant Labs: All pertinent labs within the past 24 hours have been reviewed.    Significant Imaging: I have reviewed all pertinent imaging results/findings within the past 24 hours.

## 2024-06-16 NOTE — NURSING
Patient is becoming agitated pushing staff and biting at staff. Patient given prn haldol dose. Patient assisted back to bed. Bed in lowest position, call light within reach. Bed alarm set. Sitter at bedside. Plan of care ongoing

## 2024-06-16 NOTE — PROGRESS NOTES
[facility]  Progress Note    Patient Name: Elpidio Ayala  MRN: [id number]  Patient Class: IP- Inpatient   Admission Date: 6/12/2024  Length of Stay: 2 days  Attending Physician: Edgardo Dorman DO  Primary Care Provider: Laurel, Primary Doctor        Subjective:     Principal Problem:Dementia with behavioral disturbance        HPI:  Mr. Ayala was brought in for a planned pacemaker insertion with Dr. Jimenez due to type 2 AV block.  He underwent the procedure yesterday and did well, but afterward he developed confusion and agitation, becoming combative with the staff.  Lorazepam was administered, which helped to calm him down somewhat.  The cardiologist contacted me for assistance in managing the agitation.  I ordered a dose of Seroquel to be given last night, and the patient did receive it.  However, last night he became combative again while being assisted in the restroom.  This morning he is in restraints, and there is a sitter at the bedside.  Patient has no further cardiac problems that need to be acutely addressed; therefore, the patient is transferred to my hospitalist service.  Medical history includes dementia, unknown type.    Overview/Hospital Course:  Patient was admitted to the hospital medicine service after agitation noted after planned pacemaker insertion with Dr. Jimenez due to type 2 av block.  He has a history of dementia and developed confusion and agitation after the procedure.  He was started on scheduled quetiapine and p.r.n. haloperidol.  Behavior improved.  On 06/14 there was concern that a pacemaker lead had moved and Dr. Jimenez consider taking him back for procedure to correct however this was held off as later the leads appeared to be functioning appropriately.  Cardiology has monitored patient over the weekend and rechecked leads on Monday.    Interval History: Patient said he is tired and went back to sleep      Objective:     Vital Signs (Most  Recent):  Temp: 97.7 °F (36.5 °C) (06/16/24 1315)  Pulse: 84 (06/16/24 1045)  Resp: 19 (06/16/24 1045)  BP: 100/60 (06/16/24 1045)  SpO2: 96 % (06/16/24 1045) Vital Signs (24h Range):  Temp:  [96.5 °F (35.8 °C)-98.9 °F (37.2 °C)] 97.7 °F (36.5 °C)  Pulse:  [82-96] 84  Resp:  [18-20] 19  SpO2:  [95 %-97 %] 96 %  BP: (100-169)/(60-89) 100/60     Weight: 84.8 kg (187 lb)  Body mass index is 25.36 kg/m².    Intake/Output Summary (Last 24 hours) at 6/16/2024 1340  Last data filed at 6/16/2024 1327  Gross per 24 hour   Intake 480 ml   Output 475 ml   Net 5 ml         Physical Exam  Cardiovascular:      Pulses: Normal pulses.      Heart sounds: Normal heart sounds.   Pulmonary:      Effort: Pulmonary effort is normal.      Breath sounds: Normal breath sounds.   Abdominal:      General: Abdomen is flat.   Skin:     General: Skin is warm and dry.             Significant Labs: All pertinent labs within the past 24 hours have been reviewed.    Significant Imaging: I have reviewed all pertinent imaging results/findings within the past 24 hours.    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 Dementia is mild. The patient does have signs of behavioral disturbance.  Continue non-pharmacologic interventions to prevent delirium (No VS between 11PM-5AM, activity during day, opening blinds, providing glasses/hearing aids, and up in chair during daytime). Will avoid narcotics and benzos unless absolutely necessary. PRN anti-psychotics are ordered to avoid self harm behaviors.  Increased 50 mg quetiapine in the evenings.  Also Haldol IM as needed for acute non-redirectable agitation.    Requires restraints for behavioral reasons  Not requiring restraints at this time.      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  Cardiology wanted to monitor patient over the weekend and rechecked leads on Monday.         VTE Risk  Mitigation (From admission, onward)           Ordered     heparin (porcine) injection 5,000 Units  Every 12 hours         06/15/24 1620                    Discharge Planning   MARINA: 6/17/2024     Code Status: Full Code   Is the patient medically ready for discharge?:     Reason for patient still in hospital (select all that apply): Patient trending condition  Discharge Plan A: Return to nursing home                  Edgardo Dorman DO  Department of Hospital Medicine   [facility]

## 2024-06-17 ENCOUNTER — CLINICAL SUPPORT (OUTPATIENT)
Dept: CARDIOLOGY | Facility: HOSPITAL | Age: 89
End: 2024-06-17
Attending: INTERNAL MEDICINE
Payer: MEDICARE

## 2024-06-17 ENCOUNTER — EXTERNAL HOME HEALTH (OUTPATIENT)
Dept: HOME HEALTH SERVICES | Facility: HOSPITAL | Age: 89
End: 2024-06-17
Payer: MEDICARE

## 2024-06-17 VITALS — HEIGHT: 72 IN | BODY MASS INDEX: 25.32 KG/M2 | WEIGHT: 186.94 LBS

## 2024-06-17 LAB
BSA FOR ECHO PROCEDURE: 2.08 M2
CV ECHO LV RWT: 0.37 CM
ECHO LV POSTERIOR WALL: 0.86 CM (ref 0.6–1.1)
FRACTIONAL SHORTENING: 31 % (ref 28–44)
INTERVENTRICULAR SEPTUM: 0.77 CM (ref 0.6–1.1)
LEFT ATRIUM SIZE: 1.9 CM
LEFT INTERNAL DIMENSION IN SYSTOLE: 3.24 CM (ref 2.1–4)
LEFT VENTRICLE DIASTOLIC VOLUME INDEX: 48.79 ML/M2
LEFT VENTRICLE DIASTOLIC VOLUME: 101 ML
LEFT VENTRICLE MASS INDEX: 60 G/M2
LEFT VENTRICLE SYSTOLIC VOLUME INDEX: 20.4 ML/M2
LEFT VENTRICLE SYSTOLIC VOLUME: 42.2 ML
LEFT VENTRICULAR INTERNAL DIMENSION IN DIASTOLE: 4.68 CM (ref 3.5–6)
LEFT VENTRICULAR MASS: 124.35 G
OHS CV RV/LV RATIO: 0.54 CM
PERICARDIUM LATERAL DIMENSION: 1.5 CM
RIGHT VENTRICULAR END-DIASTOLIC DIMENSION: 2.54 CM
Z-SCORE OF LEFT VENTRICULAR DIMENSION IN END DIASTOLE: -2.96
Z-SCORE OF LEFT VENTRICULAR DIMENSION IN END SYSTOLE: -1.37

## 2024-06-17 PROCEDURE — 21400001 HC TELEMETRY ROOM

## 2024-06-17 PROCEDURE — 93010 ELECTROCARDIOGRAM REPORT: CPT | Mod: ,,, | Performed by: INTERNAL MEDICINE

## 2024-06-17 PROCEDURE — 93308 TTE F-UP OR LMTD: CPT

## 2024-06-17 PROCEDURE — 63600175 PHARM REV CODE 636 W HCPCS: Performed by: FAMILY MEDICINE

## 2024-06-17 PROCEDURE — 99233 SBSQ HOSP IP/OBS HIGH 50: CPT | Mod: ,,, | Performed by: GENERAL PRACTICE

## 2024-06-17 PROCEDURE — 93005 ELECTROCARDIOGRAM TRACING: CPT | Performed by: INTERNAL MEDICINE

## 2024-06-17 PROCEDURE — 25000003 PHARM REV CODE 250: Performed by: FAMILY MEDICINE

## 2024-06-17 PROCEDURE — 93308 TTE F-UP OR LMTD: CPT | Mod: 26,,, | Performed by: GENERAL PRACTICE

## 2024-06-17 PROCEDURE — 25000003 PHARM REV CODE 250

## 2024-06-17 RX ORDER — METOPROLOL TARTRATE 25 MG/1
12.5 TABLET ORAL 2 TIMES DAILY
Status: DISCONTINUED | OUTPATIENT
Start: 2024-06-17 | End: 2024-06-18 | Stop reason: HOSPADM

## 2024-06-17 RX ORDER — QUETIAPINE FUMARATE 50 MG/1
50 TABLET, FILM COATED ORAL
Qty: 30 TABLET | Refills: 0 | Status: SHIPPED | OUTPATIENT
Start: 2024-06-17 | End: 2024-07-18

## 2024-06-17 RX ADMIN — QUETIAPINE 50 MG: 25 TABLET ORAL at 08:06

## 2024-06-17 RX ADMIN — HEPARIN SODIUM 5000 UNITS: 5000 INJECTION INTRAVENOUS; SUBCUTANEOUS at 09:06

## 2024-06-17 RX ADMIN — HEPARIN SODIUM 5000 UNITS: 5000 INJECTION INTRAVENOUS; SUBCUTANEOUS at 08:06

## 2024-06-17 RX ADMIN — SODIUM CHLORIDE 250 ML: 9 INJECTION, SOLUTION INTRAVENOUS at 05:06

## 2024-06-17 RX ADMIN — METOPROLOL TARTRATE 12.5 MG: 25 TABLET, FILM COATED ORAL at 08:06

## 2024-06-17 NOTE — PLAN OF CARE
Peggy with [facility] called DARLEEN inquiring if pt was being d/c today, if he still had a sitter, along with other questions. Upon Peggy and DARLEEN talking Peggy stated she was just going to come by the hospital and do the assessment with the floor nurses due to her wanting first hand information.     06/17/24 1107   Post-Acute Status   Post-Acute Authorization Placement   Discharge Plan   Discharge Plan A Assisted Living       1502- Peggy with [facility] came to  and spoke with DARLEEN. She stated she has someone setting up pt's place and it will be ready for tomorrow. Peggy again asked if pt had any OTC meds a RX be written for pt. Peggy req that a cont of care be sent to [facility] upon d/c. Wants Haldol in case of emergencies. DARLEEN will cont to follow.     1625- DARLEEN sent referral for HH to [facility] per req of [facility]. DARLEEN sent referral for Wheelchair to [facility]. Per [facility] they are not in network with insurance. DARLEEN sent Wheelchair to [facility]. DARLEEN will cont to follow.    1644- Brigitte with [facility] called DARLEEN back and stated pt has been approved and insurance looks good. She will deliver it tomorrow.

## 2024-06-17 NOTE — PROGRESS NOTES
[facility]  Department of Cardiology  Progress Note    PATIENT NAME: Elpidio Ayala  MRN: [id number]  TODAY'S DATE: 06/17/2024  ADMIT DATE: 6/12/2024    SUBJECTIVE     PRINCIPLE PROBLEM: Dementia with behavioral disturbance    INTERVAL HISTORY:    6/17/2024  Pt seen this afternoon up in chair eating lunch with RN and sitter at bedside. He was noted to be tachycardic on telemetry. Per RN, he had an episode of hypotension as well as shaking and unsteadiness. BP noted to be as low as 76/64. Last /78 manually.     Review of patient's allergies indicates:   Allergen Reactions    No known drug allergies        REVIEW OF SYSTEMS  CARDIOVASCULAR: No recent chest pain, palpitations, arm, neck, or jaw pain  RESPIRATORY: No recent fever, cough chills, SOB or congestion  : No blood in the urine  GI: No Nausea, vomiting, constipation, diarrhea, blood, or reflux.  MUSCULOSKELETAL: No myalgias  NEURO: No lightheadedness or dizziness  EYES: No Double vision, blurry, vision or headache     OBJECTIVE     VITAL SIGNS (Most Recent)  Temp: 99.3 °F (37.4 °C) (06/17/24 1129)  Pulse: (!) 123 (06/17/24 1245)  Resp: 18 (06/17/24 1129)  BP: 110/78 (06/17/24 1300)  SpO2: 97 % (06/17/24 1245)    VENTILATION STATUS  Resp: 18 (06/17/24 1129)  SpO2: 97 % (06/17/24 1245)           I & O (Last 24H):  Intake/Output Summary (Last 24 hours) at 6/17/2024 1442  Last data filed at 6/17/2024 0614  Gross per 24 hour   Intake 240 ml   Output 375 ml   Net -135 ml       WEIGHTS  Wt Readings from Last 3 Encounters:   06/12/24 0600 84.8 kg (187 lb)   06/11/24 0743 84.8 kg (187 lb)   06/05/24 1458 84.9 kg (187 lb 2.7 oz)       PHYSICAL EXAM  CONSTITUTIONAL: Well built, well nourished in no apparent distress  NECK: no carotid bruit, no JVD  LUNGS: CTA  CHEST WALL: no tenderness, pacer site   HEART: regular rate and rhythm, S1, S2 normal, no murmur, click, rub or gallop   ABDOMEN: soft, non-tender; bowel sounds normal; no masses,  no  "organomegaly  EXTREMITIES: Extremities normal, no edema  NEURO: AAO X 3    SCHEDULED MEDS:   heparin (porcine)  5,000 Units Subcutaneous Q12H    metoprolol tartrate  12.5 mg Oral BID    QUEtiapine  50 mg Oral After dinner       CONTINUOUS INFUSIONS:    PRN MEDS:  Current Facility-Administered Medications:     acetaminophen, 650 mg, Oral, Q4H PRN    haloperidol lactate, 5 mg, Intramuscular, Q6H PRN    ondansetron, 4 mg, Intravenous, Q6H PRN    LABS AND DIAGNOSTICS     CBC LAST 3 DAYS  Recent Labs   Lab 06/11/24  0810 06/14/24  1401   WBC 5.72 7.34   RBC 4.25* 4.55*   HGB 14.2 15.0   HCT 43.6 45.4   * 100*   MCH 33.4* 33.0*   MCHC 32.6 33.0   RDW 14.5 14.3    151   MPV 10.4 10.4   GRAN 57.4  3.3 62.8  4.6   LYMPH 28.7  1.6 22.6  1.7   MONO 10.5  0.6 11.4  0.8   BASO 0.03 0.04   NRBC 0 0       COAGULATION LAST 3 DAYS  Recent Labs   Lab 06/11/24  0810   INR 1.0   APTT 26.3       CHEMISTRY LAST 3 DAYS  Recent Labs   Lab 06/11/24  0810 06/14/24  1401    136   K 4.1 4.2    105   CO2 27 24   ANIONGAP 6* 7*   BUN 21 19   CREATININE 1.0 0.7   GLU 87 83   CALCIUM 8.9 8.7       CARDIAC PROFILE LAST 3 DAYS  No results for input(s): "BNP", "CPK", "CPKMB", "LDH", "TROPONINI", "TROPONINIHS" in the last 168 hours.    ENDOCRINE LAST 3 DAYS  Recent Labs   Lab 06/14/24  1401   TSH 3.229       LAST ARTERIAL BLOOD GAS  ABG  No results for input(s): "PH", "PO2", "PCO2", "HCO3", "BE" in the last 168 hours.    LAST 7 DAYS MICROBIOLOGY   Microbiology Results (last 7 days)       ** No results found for the last 168 hours. **            MOST RECENT IMAGING  X-Ray Chest AP Portable  Narrative: EXAMINATION:  XR CHEST AP PORTABLE    CLINICAL HISTORY:  pacemaker; Atrioventricular block, second degree    FINDINGS:  Portable chest with comparison chest x-ray 06/14/2024.  Normal cardiomediastinal silhouette.Right dual lead cardiac pacemaker again noted.  There are linear opacities of the right lung base felt to reflect " subsegmental atelectasis.  The left lung is clear.  Pulmonary vasculature is normal. No acute osseous abnormality.  Impression: Linear opacities of the right lung base are felt to reflect subsegmental atelectasis.    Electronically signed by: Dev Moran  Date:    06/17/2024  Time:    10:36      [facility]  Results for orders placed during the hospital encounter of 05/18/24    Echo    Interpretation Summary    Left Ventricle: The left ventricle is normal in size. Normal wall thickness. Unable to assess wall motion. There is low normal systolic function with a visually estimated ejection fraction of 50 - 55%.    Right Ventricle: Right ventricle was not well visualized due to poor acoustic window. Systolic function is normal.    Aortic Valve: The aortic valve is a trileaflet valve. There is moderate aortic regurgitation.    Mitral Valve: There is no stenosis. The mean pressure gradient across the mitral valve is 3 mmHg at a heart rate of  bpm. There is mild regurgitation.    Tricuspid Valve: There is mild regurgitation.      CURRENT/PREVIOUS VISIT EKG  Results for orders placed or performed during the hospital encounter of 06/12/24   EKG 12-lead    Collection Time: 06/17/24 10:22 AM   Result Value Ref Range    QRS Duration 118 ms    OHS QTC Calculation 494 ms    Narrative    Test Reason : I44.1,    Vent. Rate : 121 BPM     Atrial Rate : 072 BPM     P-R Int : 000 ms          QRS Dur : 118 ms      QT Int : 348 ms       P-R-T Axes : 000 -43 101 degrees     QTc Int : 494 ms    Ventricular-paced rhythm with occasional Premature ventricular complexes  Abnormal ECG  When compared with ECG of 12-JUN-2024 11:21,  Premature ventricular complexes are now Present  Vent. rate has increased BY  61 BPM    Referred By: MATILDA GARCIA           Confirmed By:        ASSESSMENT/PLAN:     Active Hospital Problems    Diagnosis    *Dementia with behavioral disturbance    Requires restraints for behavioral reasons    Mobitz type 2 second  degree AV block       ASSESSMENT & PLAN:     Dementia with behavioral disturbance   Bradycardia status post pacemaker placement  Hypotension      RECOMMENDATIONS:    Limited echo this morning to rule out effusion.  Patient has been hypotensive as well as tachycardic today.  Will attempt to start low dose BB - metoprolol tartrate 12.5 mg BID. Hold for BP <105.   Fluid bolus 250 cc for hypotension.  Will need to keep overnight for at least one more night for continuous monitoring.  Will continue to monitor at this time.     Antonette Kulkarni NP  Department of Cardiology  Date of Service: 06/17/2024        I have personally interviewed and examined the patient, I have reviewed the Nurse Practitioner's history and physical, assessment, and plan. I agree with the findings and plan.    PATIENT IS DOING WELL TODAY HIS PACEMAKER THRESHOLDS ARE UNCHANGED.  WHEN THEY GOT HIM UP TODAY BECAME TACHYCARDIC CONTINUOUS BE TACHYCARDIC HIS BLOOD PRESSURE WAS LOW WHEN MEASURED WITH THE AUTOMATIC CUFF BUT CHECK MANUALLY IT WAS NORMAL.  WE GAVE SOME IV FLUIDS.  HE REMAINS TACHYCARDIC SO I GOT A CHEST X-RAY WHICH REVEALED THE LEAD APPEARS TO BE IN UNCHANGED POSITION.  ECHOS PENDING PRELIMINARY IS OLD IS NO SIGN OF TAMPONADE BUT SMALL PERICARDIAL EFFUSION.  PHYSICAL EXAM IS BENIGN.  WILL ADD LOW-DOSE BETA-BLOCKER NEED TO CONTINUE CHECK BLOOD PRESSURES MANUALLY.  WILL CONTINUE TO MONITOR HIS HEART RATE NO INDICATION TO REPOSITION THE PACEMAKER LEAD AT THIS TIME ACCORDING TO PACEMAKER REPRESENTATIVE.  DISCUSSED WITH DR. JOSE Bowman M.D.  Department of Cardiology  Date of Service: 06/17/2024  8:24 AM

## 2024-06-17 NOTE — PLAN OF CARE
Problem: Adult Inpatient Plan of Care  Goal: Plan of Care Review  Outcome: Progressing  Goal: Optimal Comfort and Wellbeing  Outcome: Progressing     Problem: Skin Injury Risk Increased  Goal: Skin Health and Integrity  Outcome: Progressing     Problem: Wound  Goal: Optimal Coping  Outcome: Progressing  Goal: Optimal Pain Control and Function  Outcome: Progressing  Goal: Skin Health and Integrity  Outcome: Progressing     Problem: Fall Injury Risk  Goal: Absence of Fall and Fall-Related Injury  Outcome: Progressing

## 2024-06-17 NOTE — PROGRESS NOTES
[facility]  Progress Note    Patient Name: Elpidio Ayala  MRN: [id number]  Patient Class: IP- Inpatient   Admission Date: 6/12/2024  Length of Stay: 3 days  Attending Physician: Edgardo Dorman DO  Primary Care Provider: Laurel, Primary Doctor        Subjective:     Principal Problem:Dementia with behavioral disturbance        HPI:  Mr. Ayala was brought in for a planned pacemaker insertion with Dr. Jimenez due to type 2 AV block.  He underwent the procedure yesterday and did well, but afterward he developed confusion and agitation, becoming combative with the staff.  Lorazepam was administered, which helped to calm him down somewhat.  The cardiologist contacted me for assistance in managing the agitation.  I ordered a dose of Seroquel to be given last night, and the patient did receive it.  However, last night he became combative again while being assisted in the restroom.  This morning he is in restraints, and there is a sitter at the bedside.  Patient has no further cardiac problems that need to be acutely addressed; therefore, the patient is transferred to my hospitalist service.  Medical history includes dementia, unknown type.    Overview/Hospital Course:  Patient was admitted to the hospital medicine service after agitation noted after planned pacemaker insertion with Dr. Jimenez due to type 2 av block.  He has a history of dementia and developed confusion and agitation after the procedure.  He was started on scheduled quetiapine and p.r.n. haloperidol.  Behavior improved.  On 06/14 there was concern that a pacemaker lead had moved and Dr. Jimenez consider taking him back for procedure to correct however this was held off as later the leads appeared to be functioning appropriately.  Cardiology has monitored patient over the weekend and rechecked leads on Monday.    Interval History: Patient said he feels ok    Review of Systems   All other systems reviewed and are  negative.    Objective:     Vital Signs (Most Recent):  Temp: 98.3 °F (36.8 °C) (06/17/24 1505)  Pulse: 106 (06/17/24 1505)  Resp: 18 (06/17/24 1129)  BP: 132/67 (06/17/24 1505)  SpO2: 95 % (06/17/24 1505) Vital Signs (24h Range):  Temp:  [97.7 °F (36.5 °C)-99.3 °F (37.4 °C)] 98.3 °F (36.8 °C)  Pulse:  [] 106  Resp:  [18] 18  SpO2:  [94 %-97 %] 95 %  BP: ()/(64-80) 132/67     Weight: 84.8 kg (187 lb)  Body mass index is 25.36 kg/m².    Intake/Output Summary (Last 24 hours) at 6/17/2024 1650  Last data filed at 6/17/2024 1502  Gross per 24 hour   Intake 240 ml   Output 475 ml   Net -235 ml         Physical Exam  Cardiovascular:      Rate and Rhythm: Normal rate.      Pulses: Normal pulses.   Pulmonary:      Effort: Pulmonary effort is normal.   Neurological:      Mental Status: Mental status is at baseline.             Significant Labs: All pertinent labs within the past 24 hours have been reviewed.    Significant Imaging: I have reviewed all pertinent imaging results/findings within the past 24 hours.    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 Dementia is mild. The patient does have signs of behavioral disturbance.  Continue non-pharmacologic interventions to prevent delirium (No VS between 11PM-5AM, activity during day, opening blinds, providing glasses/hearing aids, and up in chair during daytime). Will avoid narcotics and benzos unless absolutely necessary. PRN anti-psychotics are ordered to avoid self harm behaviors.  Increased 50 mg quetiapine in the evenings.  Also Haldol IM as needed for acute non-redirectable agitation.    Requires restraints for behavioral reasons  Not requiring restraints at this time.      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    Cardiology is following patient       VTE Risk Mitigation (From admission, onward)           Ordered     heparin (porcine)  injection 5,000 Units  Every 12 hours         06/15/24 1620                    Discharge Planning   MARINA: 6/18/2024     Code Status: Full Code   Is the patient medically ready for discharge?:     Reason for patient still in hospital (select all that apply): Patient trending condition  Discharge Plan A: New Nursing Home placement - half-way care facility   Discharge Delays: None known at this time              Edgardo Dorman DO  Department of Hospital Medicine   [facility]

## 2024-06-17 NOTE — SUBJECTIVE & OBJECTIVE
Interval History: Patient said he feels ok    Review of Systems   All other systems reviewed and are negative.    Objective:     Vital Signs (Most Recent):  Temp: 98.3 °F (36.8 °C) (06/17/24 1505)  Pulse: 106 (06/17/24 1505)  Resp: 18 (06/17/24 1129)  BP: 132/67 (06/17/24 1505)  SpO2: 95 % (06/17/24 1505) Vital Signs (24h Range):  Temp:  [97.7 °F (36.5 °C)-99.3 °F (37.4 °C)] 98.3 °F (36.8 °C)  Pulse:  [] 106  Resp:  [18] 18  SpO2:  [94 %-97 %] 95 %  BP: ()/(64-80) 132/67     Weight: 84.8 kg (187 lb)  Body mass index is 25.36 kg/m².    Intake/Output Summary (Last 24 hours) at 6/17/2024 1650  Last data filed at 6/17/2024 1502  Gross per 24 hour   Intake 240 ml   Output 475 ml   Net -235 ml         Physical Exam  Cardiovascular:      Rate and Rhythm: Normal rate.      Pulses: Normal pulses.   Pulmonary:      Effort: Pulmonary effort is normal.   Neurological:      Mental Status: Mental status is at baseline.             Significant Labs: All pertinent labs within the past 24 hours have been reviewed.    Significant Imaging: I have reviewed all pertinent imaging results/findings within the past 24 hours.

## 2024-06-17 NOTE — CARE UPDATE
Pt has a mobility limitation that significantly impairs their ability to participate in one or more mobility related activities of daily living (MRADL's) such as toileting, feeding, dressing, grooming, and bathing in customary locations in the home.  The mobility limitation cannot be sufficiently resolved by the use of a cane or walker.   The use of a manual wheelchair will significantly improve the patient's ability to participate in MRADLS and the patient will use it on regular basis in the home.  Pt has expressed their willingness to use a manual wheelchair in the home.   They also has a caregiver who is available, willing, and able to provide assistance with the wheelchair when needed.

## 2024-06-18 ENCOUNTER — HOSPITAL ENCOUNTER (INPATIENT)
Facility: HOSPITAL | Age: 89
LOS: 3 days | Discharge: HOME HEALTH | DRG: 871 | End: 2024-06-21
Attending: EMERGENCY MEDICINE | Admitting: HOSPITALIST
Payer: MEDICARE

## 2024-06-18 VITALS
HEIGHT: 72 IN | TEMPERATURE: 97 F | WEIGHT: 187 LBS | RESPIRATION RATE: 18 BRPM | BODY MASS INDEX: 25.33 KG/M2 | OXYGEN SATURATION: 96 % | HEART RATE: 94 BPM | DIASTOLIC BLOOD PRESSURE: 84 MMHG | SYSTOLIC BLOOD PRESSURE: 132 MMHG

## 2024-06-18 DIAGNOSIS — R11.2 NAUSEA AND VOMITING, UNSPECIFIED VOMITING TYPE: ICD-10-CM

## 2024-06-18 DIAGNOSIS — R07.9 CHEST PAIN: ICD-10-CM

## 2024-06-18 DIAGNOSIS — A41.9 SEPSIS, DUE TO UNSPECIFIED ORGANISM, UNSPECIFIED WHETHER ACUTE ORGAN DYSFUNCTION PRESENT: ICD-10-CM

## 2024-06-18 DIAGNOSIS — R65.20 SEVERE SEPSIS: Primary | ICD-10-CM

## 2024-06-18 DIAGNOSIS — J18.9 PNEUMONIA OF BOTH LUNGS DUE TO INFECTIOUS ORGANISM, UNSPECIFIED PART OF LUNG: ICD-10-CM

## 2024-06-18 DIAGNOSIS — A41.9 SEVERE SEPSIS: Primary | ICD-10-CM

## 2024-06-18 DIAGNOSIS — R41.82 AMS (ALTERED MENTAL STATUS): ICD-10-CM

## 2024-06-18 LAB
ABO + RH BLD: NORMAL
ALBUMIN SERPL BCP-MCNC: 2.8 G/DL (ref 3.5–5.2)
ALP SERPL-CCNC: 51 U/L (ref 55–135)
ALT SERPL W/O P-5'-P-CCNC: 12 U/L (ref 10–44)
ANION GAP SERPL CALC-SCNC: 7 MMOL/L (ref 8–16)
APTT PPP: 29.6 SEC (ref 21–32)
AST SERPL-CCNC: 20 U/L (ref 10–40)
BASOPHILS # BLD AUTO: 0.03 K/UL (ref 0–0.2)
BASOPHILS NFR BLD: 0.6 % (ref 0–1.9)
BILIRUB SERPL-MCNC: 1 MG/DL (ref 0.1–1)
BILIRUB UR QL STRIP: NEGATIVE
BLD GP AB SCN CELLS X3 SERPL QL: NORMAL
BNP SERPL-MCNC: 128 PG/ML (ref 0–99)
BUN SERPL-MCNC: 30 MG/DL (ref 8–23)
CALCIUM SERPL-MCNC: 7.5 MG/DL (ref 8.7–10.5)
CHLORIDE SERPL-SCNC: 105 MMOL/L (ref 95–110)
CLARITY UR: CLEAR
CO2 SERPL-SCNC: 20 MMOL/L (ref 23–29)
COLOR UR: YELLOW
CREAT SERPL-MCNC: 1.1 MG/DL (ref 0.5–1.4)
CREAT SERPL-MCNC: 1.3 MG/DL (ref 0.5–1.4)
DIFFERENTIAL METHOD BLD: ABNORMAL
EOSINOPHIL # BLD AUTO: 0 K/UL (ref 0–0.5)
EOSINOPHIL NFR BLD: 0 % (ref 0–8)
ERYTHROCYTE [DISTWIDTH] IN BLOOD BY AUTOMATED COUNT: 14.2 % (ref 11.5–14.5)
EST. GFR  (NO RACE VARIABLE): >60 ML/MIN/1.73 M^2
GLUCOSE SERPL-MCNC: 102 MG/DL (ref 70–110)
GLUCOSE UR QL STRIP: NEGATIVE
HCT VFR BLD AUTO: 44.1 % (ref 40–54)
HGB BLD-MCNC: 14.7 G/DL (ref 14–18)
HGB UR QL STRIP: ABNORMAL
IMM GRANULOCYTES # BLD AUTO: 0.07 K/UL (ref 0–0.04)
IMM GRANULOCYTES NFR BLD AUTO: 1.3 % (ref 0–0.5)
INR PPP: 1.1 (ref 0.8–1.2)
KETONES UR QL STRIP: NEGATIVE
LDH SERPL L TO P-CCNC: 2.05 MMOL/L (ref 0.5–2.2)
LEUKOCYTE ESTERASE UR QL STRIP: NEGATIVE
LIPASE SERPL-CCNC: 65 U/L (ref 4–60)
LYMPHOCYTES # BLD AUTO: 0.9 K/UL (ref 1–4.8)
LYMPHOCYTES NFR BLD: 16.9 % (ref 18–48)
MAGNESIUM SERPL-MCNC: 1.6 MG/DL (ref 1.6–2.6)
MCH RBC QN AUTO: 33.6 PG (ref 27–31)
MCHC RBC AUTO-ENTMCNC: 33.3 G/DL (ref 32–36)
MCV RBC AUTO: 101 FL (ref 82–98)
MICROSCOPIC COMMENT: ABNORMAL
MONOCYTES # BLD AUTO: 0.9 K/UL (ref 0.3–1)
MONOCYTES NFR BLD: 16.7 % (ref 4–15)
NEUTROPHILS # BLD AUTO: 3.5 K/UL (ref 1.8–7.7)
NEUTROPHILS NFR BLD: 64.5 % (ref 38–73)
NITRITE UR QL STRIP: NEGATIVE
NRBC BLD-RTO: 0 /100 WBC
OB PNL STL: NEGATIVE
PH UR STRIP: 6 [PH] (ref 5–8)
PLATELET # BLD AUTO: 128 K/UL (ref 150–450)
PMV BLD AUTO: 11.5 FL (ref 9.2–12.9)
POTASSIUM SERPL-SCNC: 4.4 MMOL/L (ref 3.5–5.1)
PROCALCITONIN SERPL IA-MCNC: 0.09 NG/ML (ref 0–0.5)
PROT SERPL-MCNC: 4.7 G/DL (ref 6–8.4)
PROT UR QL STRIP: ABNORMAL
PROTHROMBIN TIME: 12 SEC (ref 9–12.5)
RBC # BLD AUTO: 4.37 M/UL (ref 4.6–6.2)
RBC #/AREA URNS HPF: 13 /HPF (ref 0–4)
SAMPLE: NORMAL
SAMPLE: NORMAL
SODIUM SERPL-SCNC: 132 MMOL/L (ref 136–145)
SP GR UR STRIP: 1.03 (ref 1–1.03)
SPECIMEN OUTDATE: NORMAL
SQUAMOUS #/AREA URNS HPF: 3 /HPF
TROPONIN I SERPL HS-MCNC: 18.1 PG/ML (ref 0–14.9)
URN SPEC COLLECT METH UR: ABNORMAL
UROBILINOGEN UR STRIP-ACNC: NEGATIVE EU/DL
WBC # BLD AUTO: 5.38 K/UL (ref 3.9–12.7)
WBC #/AREA URNS HPF: 2 /HPF (ref 0–5)

## 2024-06-18 PROCEDURE — 87040 BLOOD CULTURE FOR BACTERIA: CPT | Mod: 59 | Performed by: EMERGENCY MEDICINE

## 2024-06-18 PROCEDURE — 83690 ASSAY OF LIPASE: CPT | Performed by: EMERGENCY MEDICINE

## 2024-06-18 PROCEDURE — 96361 HYDRATE IV INFUSION ADD-ON: CPT

## 2024-06-18 PROCEDURE — 25500020 PHARM REV CODE 255: Performed by: EMERGENCY MEDICINE

## 2024-06-18 PROCEDURE — C9113 INJ PANTOPRAZOLE SODIUM, VIA: HCPCS | Performed by: EMERGENCY MEDICINE

## 2024-06-18 PROCEDURE — 36415 COLL VENOUS BLD VENIPUNCTURE: CPT | Performed by: EMERGENCY MEDICINE

## 2024-06-18 PROCEDURE — 85730 THROMBOPLASTIN TIME PARTIAL: CPT | Performed by: EMERGENCY MEDICINE

## 2024-06-18 PROCEDURE — 99285 EMERGENCY DEPT VISIT HI MDM: CPT | Mod: 25

## 2024-06-18 PROCEDURE — 85610 PROTHROMBIN TIME: CPT | Performed by: EMERGENCY MEDICINE

## 2024-06-18 PROCEDURE — 93005 ELECTROCARDIOGRAM TRACING: CPT | Performed by: INTERNAL MEDICINE

## 2024-06-18 PROCEDURE — 96360 HYDRATION IV INFUSION INIT: CPT | Mod: 59

## 2024-06-18 PROCEDURE — 81001 URINALYSIS AUTO W/SCOPE: CPT | Performed by: EMERGENCY MEDICINE

## 2024-06-18 PROCEDURE — 83735 ASSAY OF MAGNESIUM: CPT | Performed by: EMERGENCY MEDICINE

## 2024-06-18 PROCEDURE — 96365 THER/PROPH/DIAG IV INF INIT: CPT

## 2024-06-18 PROCEDURE — 63600175 PHARM REV CODE 636 W HCPCS: Performed by: EMERGENCY MEDICINE

## 2024-06-18 PROCEDURE — 96367 TX/PROPH/DG ADDL SEQ IV INF: CPT

## 2024-06-18 PROCEDURE — 86900 BLOOD TYPING SEROLOGIC ABO: CPT | Performed by: EMERGENCY MEDICINE

## 2024-06-18 PROCEDURE — 82272 OCCULT BLD FECES 1-3 TESTS: CPT | Performed by: EMERGENCY MEDICINE

## 2024-06-18 PROCEDURE — 25000003 PHARM REV CODE 250: Performed by: HOSPITALIST

## 2024-06-18 PROCEDURE — 96366 THER/PROPH/DIAG IV INF ADDON: CPT

## 2024-06-18 PROCEDURE — 63600175 PHARM REV CODE 636 W HCPCS: Performed by: HOSPITALIST

## 2024-06-18 PROCEDURE — 85025 COMPLETE CBC W/AUTO DIFF WBC: CPT | Performed by: EMERGENCY MEDICINE

## 2024-06-18 PROCEDURE — 93010 ELECTROCARDIOGRAM REPORT: CPT | Mod: ,,, | Performed by: INTERNAL MEDICINE

## 2024-06-18 PROCEDURE — 80053 COMPREHEN METABOLIC PANEL: CPT | Performed by: EMERGENCY MEDICINE

## 2024-06-18 PROCEDURE — 96375 TX/PRO/DX INJ NEW DRUG ADDON: CPT

## 2024-06-18 PROCEDURE — 63600175 PHARM REV CODE 636 W HCPCS: Performed by: FAMILY MEDICINE

## 2024-06-18 PROCEDURE — 86901 BLOOD TYPING SEROLOGIC RH(D): CPT | Performed by: EMERGENCY MEDICINE

## 2024-06-18 PROCEDURE — 84484 ASSAY OF TROPONIN QUANT: CPT | Performed by: EMERGENCY MEDICINE

## 2024-06-18 PROCEDURE — 25000003 PHARM REV CODE 250: Performed by: EMERGENCY MEDICINE

## 2024-06-18 PROCEDURE — 20000000 HC ICU ROOM

## 2024-06-18 PROCEDURE — 83880 ASSAY OF NATRIURETIC PEPTIDE: CPT | Performed by: EMERGENCY MEDICINE

## 2024-06-18 PROCEDURE — 84145 PROCALCITONIN (PCT): CPT | Performed by: EMERGENCY MEDICINE

## 2024-06-18 PROCEDURE — 25000003 PHARM REV CODE 250

## 2024-06-18 RX ORDER — LANOLIN ALCOHOL/MO/W.PET/CERES
800 CREAM (GRAM) TOPICAL
Status: DISCONTINUED | OUTPATIENT
Start: 2024-06-18 | End: 2024-06-21 | Stop reason: HOSPADM

## 2024-06-18 RX ORDER — HYDROCODONE BITARTRATE AND ACETAMINOPHEN 5; 325 MG/1; MG/1
1 TABLET ORAL EVERY 6 HOURS PRN
Status: DISCONTINUED | OUTPATIENT
Start: 2024-06-18 | End: 2024-06-21 | Stop reason: HOSPADM

## 2024-06-18 RX ORDER — HALOPERIDOL 5 MG/ML
2 INJECTION INTRAMUSCULAR EVERY 6 HOURS PRN
Status: DISCONTINUED | OUTPATIENT
Start: 2024-06-18 | End: 2024-06-21 | Stop reason: HOSPADM

## 2024-06-18 RX ORDER — MUPIROCIN 20 MG/G
OINTMENT TOPICAL 2 TIMES DAILY
Status: DISCONTINUED | OUTPATIENT
Start: 2024-06-18 | End: 2024-06-21 | Stop reason: HOSPADM

## 2024-06-18 RX ORDER — ALUMINUM HYDROXIDE, MAGNESIUM HYDROXIDE, AND SIMETHICONE 1200; 120; 1200 MG/30ML; MG/30ML; MG/30ML
30 SUSPENSION ORAL 4 TIMES DAILY PRN
Status: DISCONTINUED | OUTPATIENT
Start: 2024-06-18 | End: 2024-06-21 | Stop reason: HOSPADM

## 2024-06-18 RX ORDER — SODIUM CHLORIDE 0.9 % (FLUSH) 0.9 %
10 SYRINGE (ML) INJECTION EVERY 12 HOURS PRN
Status: DISCONTINUED | OUTPATIENT
Start: 2024-06-18 | End: 2024-06-21 | Stop reason: HOSPADM

## 2024-06-18 RX ORDER — ACETAMINOPHEN 325 MG/1
650 TABLET ORAL EVERY 8 HOURS PRN
Status: DISCONTINUED | OUTPATIENT
Start: 2024-06-18 | End: 2024-06-21 | Stop reason: HOSPADM

## 2024-06-18 RX ORDER — NALOXONE HCL 0.4 MG/ML
0.02 VIAL (ML) INJECTION
Status: DISCONTINUED | OUTPATIENT
Start: 2024-06-18 | End: 2024-06-21 | Stop reason: HOSPADM

## 2024-06-18 RX ORDER — IBUPROFEN 200 MG
24 TABLET ORAL
Status: DISCONTINUED | OUTPATIENT
Start: 2024-06-18 | End: 2024-06-21 | Stop reason: HOSPADM

## 2024-06-18 RX ORDER — TALC
6 POWDER (GRAM) TOPICAL NIGHTLY PRN
Status: DISCONTINUED | OUTPATIENT
Start: 2024-06-18 | End: 2024-06-21 | Stop reason: HOSPADM

## 2024-06-18 RX ORDER — AMOXICILLIN 250 MG
1 CAPSULE ORAL DAILY PRN
Status: DISCONTINUED | OUTPATIENT
Start: 2024-06-18 | End: 2024-06-21 | Stop reason: HOSPADM

## 2024-06-18 RX ORDER — ACETAMINOPHEN 325 MG/1
650 TABLET ORAL EVERY 4 HOURS PRN
Status: DISCONTINUED | OUTPATIENT
Start: 2024-06-18 | End: 2024-06-21 | Stop reason: HOSPADM

## 2024-06-18 RX ORDER — METOPROLOL TARTRATE 25 MG/1
12.5 TABLET, FILM COATED ORAL 2 TIMES DAILY
Qty: 30 TABLET | Refills: 0 | Status: SHIPPED | OUTPATIENT
Start: 2024-06-18 | End: 2024-07-18

## 2024-06-18 RX ORDER — SODIUM,POTASSIUM PHOSPHATES 280-250MG
2 POWDER IN PACKET (EA) ORAL
Status: DISCONTINUED | OUTPATIENT
Start: 2024-06-18 | End: 2024-06-21 | Stop reason: HOSPADM

## 2024-06-18 RX ORDER — PANTOPRAZOLE SODIUM 40 MG/10ML
80 INJECTION, POWDER, LYOPHILIZED, FOR SOLUTION INTRAVENOUS ONCE
Status: COMPLETED | OUTPATIENT
Start: 2024-06-18 | End: 2024-06-18

## 2024-06-18 RX ORDER — SODIUM CHLORIDE 450 MG/100ML
INJECTION, SOLUTION INTRAVENOUS CONTINUOUS
Status: DISCONTINUED | OUTPATIENT
Start: 2024-06-18 | End: 2024-06-20

## 2024-06-18 RX ORDER — UBIDECARENONE 75 MG
500 CAPSULE ORAL DAILY
COMMUNITY

## 2024-06-18 RX ORDER — HEPARIN SODIUM 5000 [USP'U]/ML
5000 INJECTION, SOLUTION INTRAVENOUS; SUBCUTANEOUS EVERY 8 HOURS
Status: DISCONTINUED | OUTPATIENT
Start: 2024-06-18 | End: 2024-06-19

## 2024-06-18 RX ORDER — ONDANSETRON HYDROCHLORIDE 2 MG/ML
4 INJECTION, SOLUTION INTRAVENOUS EVERY 6 HOURS PRN
Status: DISCONTINUED | OUTPATIENT
Start: 2024-06-18 | End: 2024-06-21 | Stop reason: HOSPADM

## 2024-06-18 RX ORDER — GLUCAGON 1 MG
1 KIT INJECTION
Status: DISCONTINUED | OUTPATIENT
Start: 2024-06-18 | End: 2024-06-21 | Stop reason: HOSPADM

## 2024-06-18 RX ORDER — IBUPROFEN 200 MG
16 TABLET ORAL
Status: DISCONTINUED | OUTPATIENT
Start: 2024-06-18 | End: 2024-06-21 | Stop reason: HOSPADM

## 2024-06-18 RX ORDER — ACETAMINOPHEN 650 MG/1
650 SUPPOSITORY RECTAL
Status: COMPLETED | OUTPATIENT
Start: 2024-06-18 | End: 2024-06-18

## 2024-06-18 RX ADMIN — SODIUM CHLORIDE 1000 ML: 9 INJECTION, SOLUTION INTRAVENOUS at 07:06

## 2024-06-18 RX ADMIN — IOHEXOL 100 ML: 350 INJECTION, SOLUTION INTRAVENOUS at 07:06

## 2024-06-18 RX ADMIN — PIPERACILLIN SODIUM AND TAZOBACTAM SODIUM 4.5 G: 4; .5 INJECTION, POWDER, LYOPHILIZED, FOR SOLUTION INTRAVENOUS at 08:06

## 2024-06-18 RX ADMIN — HEPARIN SODIUM 5000 UNITS: 5000 INJECTION INTRAVENOUS; SUBCUTANEOUS at 11:06

## 2024-06-18 RX ADMIN — SODIUM CHLORIDE: 0.45 INJECTION, SOLUTION INTRAVENOUS at 11:06

## 2024-06-18 RX ADMIN — METOPROLOL TARTRATE 12.5 MG: 25 TABLET, FILM COATED ORAL at 08:06

## 2024-06-18 RX ADMIN — MUPIROCIN 1 G: 20 OINTMENT TOPICAL at 11:06

## 2024-06-18 RX ADMIN — PANTOPRAZOLE SODIUM 80 MG: 40 INJECTION, POWDER, FOR SOLUTION INTRAVENOUS at 08:06

## 2024-06-18 RX ADMIN — ACETAMINOPHEN 650 MG: 650 SUPPOSITORY RECTAL at 07:06

## 2024-06-18 RX ADMIN — VANCOMYCIN HYDROCHLORIDE 1750 MG: 500 INJECTION, POWDER, LYOPHILIZED, FOR SOLUTION INTRAVENOUS at 09:06

## 2024-06-18 RX ADMIN — HEPARIN SODIUM 5000 UNITS: 5000 INJECTION INTRAVENOUS; SUBCUTANEOUS at 08:06

## 2024-06-18 RX ADMIN — SODIUM CHLORIDE 1000 ML: 9 INJECTION, SOLUTION INTRAVENOUS at 08:06

## 2024-06-18 NOTE — PLAN OF CARE
Problem: Adult Inpatient Plan of Care  Goal: Plan of Care Review  Outcome: Progressing  Goal: Patient-Specific Goal (Individualized)  Outcome: Progressing  Goal: Absence of Hospital-Acquired Illness or Injury  Outcome: Progressing     Problem: Sepsis/Septic Shock  Goal: Optimal Coping  Outcome: Progressing     Problem: Skin Injury Risk Increased  Goal: Skin Health and Integrity  Outcome: Progressing

## 2024-06-18 NOTE — Clinical Note
Diagnosis: Severe sepsis [090617]   Future Attending Provider: SUNSHINE DENT [[id number]]   Admit to which facility:: [facility] [[id number]]   Reason for IP Medical Treatment  (Clinical interventions that can only be accomplished in the IP setting? ) :: sepsis   I certify that Inpatient services for greater than or equal to 2 midnights are medically necessary:: Yes   Plans for Post-Acute care--if anticipated (pick the single best option):: A. No post acute care anticipated at this time   Special Needs:: No Special Needs [1]

## 2024-06-18 NOTE — NURSING
Patients IV removed. Monitor removed and returned. Gave report to nurse Jen Zapata. All questions answered per this nurse. Transport arrived for patient to be discharged to facility, providing facility wheelchair for patient. Patient sent with all belongings.

## 2024-06-18 NOTE — PLAN OF CARE
Problem: Adult Inpatient Plan of Care  Goal: Plan of Care Review  Outcome: Met  Goal: Patient-Specific Goal (Individualized)  Outcome: Met  Goal: Absence of Hospital-Acquired Illness or Injury  Outcome: Met  Goal: Optimal Comfort and Wellbeing  Outcome: Met  Goal: Readiness for Transition of Care  Outcome: Met     Problem: Sepsis/Septic Shock  Goal: Optimal Coping  Outcome: Met  Goal: Absence of Bleeding  Outcome: Met  Goal: Blood Glucose Level Within Targeted Range  Outcome: Met  Goal: Absence of Infection Signs and Symptoms  Outcome: Met  Goal: Optimal Nutrition Intake  Outcome: Met     Problem: Pneumonia  Goal: Fluid Balance  Outcome: Met  Goal: Resolution of Infection Signs and Symptoms  Outcome: Met  Goal: Effective Oxygenation and Ventilation  Outcome: Met     Problem: Skin Injury Risk Increased  Goal: Skin Health and Integrity  Outcome: Met     Problem: Wound  Goal: Optimal Coping  Outcome: Met  Goal: Optimal Functional Ability  Outcome: Met  Goal: Absence of Infection Signs and Symptoms  Outcome: Met  Goal: Improved Oral Intake  Outcome: Met  Goal: Optimal Pain Control and Function  Outcome: Met  Goal: Skin Health and Integrity  Outcome: Met  Goal: Optimal Wound Healing  Outcome: Met     Problem: Fall Injury Risk  Goal: Absence of Fall and Fall-Related Injury  Outcome: Met     Problem: Restraint, Nonviolent  Goal: Absence of Harm or Injury  Outcome: Met     Problem: Restraint, Violent or Self-Destructive  Goal: Absence of Harm or Injury  Outcome: Met

## 2024-06-18 NOTE — DISCHARGE SUMMARY
[facility]  Discharge Summary      Patient Name: Elpidio Ayala  MRN: [id number]  [facility]: [id number]  Patient Class: IP- Inpatient  Admission Date: 6/12/2024  Hospital Length of Stay: 4 days  Discharge Date and Time:  06/18/2024 4:29 PM  Attending Physician: Laurel att. providers found   Discharging Provider: Edgardo Dorman DO  Primary Care Provider: Laurel, Primary Doctor    Primary Care Team: Networked reference to record PCT     HPI:   Mr. Ayala was brought in for a planned pacemaker insertion with Dr. Jimenez due to type 2 AV block.  He underwent the procedure yesterday and did well, but afterward he developed confusion and agitation, becoming combative with the staff.  Lorazepam was administered, which helped to calm him down somewhat.  The cardiologist contacted me for assistance in managing the agitation.  I ordered a dose of Seroquel to be given last night, and the patient did receive it.  However, last night he became combative again while being assisted in the restroom.  This morning he is in restraints, and there is a sitter at the bedside.  Patient has no further cardiac problems that need to be acutely addressed; therefore, the patient is transferred to my hospitalist service.  Medical history includes dementia, unknown type.    Procedure(s) (LRB):  REVISION, INSERTION, ELECTRODE LEAD, ICD (Right)  REVISION, INSERTION, ELECTRODE LEAD, ICD (Right)      Hospital Course:   Patient was admitted to the hospital medicine service after agitation noted after planned pacemaker insertion with Dr. Jimenez due to type 2 av block.  He has a history of dementia and developed confusion and agitation after the procedure.  He was started on scheduled quetiapine and p.r.n. haloperidol.  Behavior improved.  On 06/14 there was concern that a pacemaker lead had moved and Dr. Jimenez consider taking him back for procedure to correct however this was held off as later the leads appeared to be  "functioning appropriately.  Cardiology has monitored patient over the weekend and rechecked leads on Monday.  Patient was started on carvedilol BID for rate control.    Patient was discharged with following instructions.  Follow up with pcp 1-2 weeks  Follow up with cardiology 1-2 weeks     Goals of Care Treatment Preferences:  Code Status: Full Code    Living Will: Yes              Consults:     No new Assessment & Plan notes have been filed under this hospital service since the last note was generated.  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    Specialties: Interventional Cardiology, Cardiovascular Disease, Cardiology  Contact information:  [address] [place] [address]  [address]  CARDIOLOGY [facility] [id number]  [phone]               No, Primary Doctor Follow up in 1 week(s).                           Patient Instructions:      WHEELCHAIR FOR HOME USE     Order Specific Question Answer Comments   Hours in W/C per day: 4    Type of Wheelchair: Standard    Size(Width): 18"(STD adult)    Leg Support: STD footrests    Lap Belt: Velcro    Accessories: Anti-tippers    Cushion: Basic    Reclining Back No    Height: 6' (1.829 m)    Weight: 84.8 kg (187 lb)    Does patient have medical equipment at home? none    Length of need (1-99 months): 99    Please check all that apply: Caregiver is capable and willing to operate wheelchair safely.    Please check all that apply: The patient requires the use of a w/c for activities of daily living within the Home.    Please check all that " apply: Patient mobility limitations cannot be sufficiently resolved by the use of other ambulatory therapies.    Please check all that apply: Patient's upper body strength is sufficient for propulsion.      Ambulatory referral/consult to Home Health   Standing Status: Future   Referral Priority: Routine Referral Type: Home Health   Referral Reason: Specialty Services Required   Requested Specialty: Home Health Services   Number of Visits Requested: 1       Significant Diagnostic Studies: N/A    Pending Diagnostic Studies:       Procedure Component Value Units Date/Time    EKG 12-lead [[id number]]     Order Status: Sent Lab Status: No result     EKG 12-lead [[id number]] Collected: 06/17/24 1022    Order Status: Sent Lab Status: In process Updated: 06/17/24 1052     QRS Duration 118 ms      OHS QTC Calculation 494 ms     Narrative:      Test Reason : I44.1,    Vent. Rate : 121 BPM     Atrial Rate : 072 BPM     P-R Int : 000 ms          QRS Dur : 118 ms      QT Int : 348 ms       P-R-T Axes : 000 -43 101 degrees     QTc Int : 494 ms    Ventricular-paced rhythm with occasional Premature ventricular complexes  Abnormal ECG  When compared with ECG of 12-JUN-2024 11:21,  Premature ventricular complexes are now Present  Vent. rate has increased BY  61 BPM    Referred By: MATILDA GARCIA           Confirmed By:            Medications:  None    Indwelling Lines/Drains at time of discharge:   Lines/Drains/Airways       None                   Time spent on the discharge of patient: 32 minutes         Edgardo Dorman DO  Department of Hospital Medicine  [facility]

## 2024-06-18 NOTE — PLAN OF CARE
Charts and orders reviewed. Pt to discharge home to [facility]. [facility] added to pt's AVS Pt has no other needs to be addressed by case management. Pt cleared to discharge from case management standpoint.     DARLEEN faxed over documents such as RX, chest Xray, and d/c summary to Peggy at [phone]. DARLEEN will also give paperwork to  as well. DARLEEN gave JAGUAR Kirby information to call report. Per Peggy with [facility] they are going to call pt and will transport pt from hospital to facility.     DARLEEN spoke with Liberty with [facility] and she stating pt RACHELL date is 06/19/2024.     06/18/24 1112   Final Note   Assessment Type Final Discharge Note   Anticipated Discharge Disposition Home   What phone number can be called within the next 1-3 days to see how you are doing after discharge? [phone]   Hospital Resources/Appts/Education Provided Provided patient/caregiver with written discharge plan information;Provided education on problems/symptoms using teachback;Appointments scheduled and added to AVS   Post-Acute Status   Coverage MEDICARE - MEDICARE RAILROAD custodial   Discharge Delays None known at this time

## 2024-06-19 LAB
ALBUMIN SERPL BCP-MCNC: 3 G/DL (ref 3.5–5.2)
ALP SERPL-CCNC: 55 U/L (ref 55–135)
ALT SERPL W/O P-5'-P-CCNC: 14 U/L (ref 10–44)
ANION GAP SERPL CALC-SCNC: 9 MMOL/L (ref 8–16)
AST SERPL-CCNC: 25 U/L (ref 10–40)
BASOPHILS # BLD AUTO: 0.02 K/UL (ref 0–0.2)
BASOPHILS NFR BLD: 0.4 % (ref 0–1.9)
BILIRUB SERPL-MCNC: 1.1 MG/DL (ref 0.1–1)
BUN SERPL-MCNC: 26 MG/DL (ref 8–23)
CALCIUM SERPL-MCNC: 7.9 MG/DL (ref 8.7–10.5)
CHLORIDE SERPL-SCNC: 106 MMOL/L (ref 95–110)
CO2 SERPL-SCNC: 19 MMOL/L (ref 23–29)
CREAT SERPL-MCNC: 0.9 MG/DL (ref 0.5–1.4)
DIFFERENTIAL METHOD BLD: ABNORMAL
EOSINOPHIL # BLD AUTO: 0 K/UL (ref 0–0.5)
EOSINOPHIL NFR BLD: 0.2 % (ref 0–8)
ERYTHROCYTE [DISTWIDTH] IN BLOOD BY AUTOMATED COUNT: 14.6 % (ref 11.5–14.5)
EST. GFR  (NO RACE VARIABLE): >60 ML/MIN/1.73 M^2
GLUCOSE SERPL-MCNC: 85 MG/DL (ref 70–110)
HCT VFR BLD AUTO: 40.8 % (ref 40–54)
HGB BLD-MCNC: 13.3 G/DL (ref 14–18)
IMM GRANULOCYTES # BLD AUTO: 0.05 K/UL (ref 0–0.04)
IMM GRANULOCYTES NFR BLD AUTO: 1.1 % (ref 0–0.5)
LACTATE SERPL-SCNC: 0.9 MMOL/L (ref 0.5–1.9)
LYMPHOCYTES # BLD AUTO: 1 K/UL (ref 1–4.8)
LYMPHOCYTES NFR BLD: 21.1 % (ref 18–48)
MAGNESIUM SERPL-MCNC: 1.8 MG/DL (ref 1.6–2.6)
MCH RBC QN AUTO: 33.6 PG (ref 27–31)
MCHC RBC AUTO-ENTMCNC: 32.6 G/DL (ref 32–36)
MCV RBC AUTO: 103 FL (ref 82–98)
MONOCYTES # BLD AUTO: 0.6 K/UL (ref 0.3–1)
MONOCYTES NFR BLD: 14 % (ref 4–15)
NEUTROPHILS # BLD AUTO: 2.8 K/UL (ref 1.8–7.7)
NEUTROPHILS NFR BLD: 63.2 % (ref 38–73)
NRBC BLD-RTO: 0 /100 WBC
PLATELET # BLD AUTO: 98 K/UL (ref 150–450)
PMV BLD AUTO: 11 FL (ref 9.2–12.9)
POTASSIUM SERPL-SCNC: 4 MMOL/L (ref 3.5–5.1)
PROT SERPL-MCNC: 5.1 G/DL (ref 6–8.4)
RBC # BLD AUTO: 3.96 M/UL (ref 4.6–6.2)
SODIUM SERPL-SCNC: 134 MMOL/L (ref 136–145)
WBC # BLD AUTO: 4.5 K/UL (ref 3.9–12.7)

## 2024-06-19 PROCEDURE — 27000221 HC OXYGEN, UP TO 24 HOURS

## 2024-06-19 PROCEDURE — 21400001 HC TELEMETRY ROOM

## 2024-06-19 PROCEDURE — 83605 ASSAY OF LACTIC ACID: CPT | Performed by: EMERGENCY MEDICINE

## 2024-06-19 PROCEDURE — 83735 ASSAY OF MAGNESIUM: CPT | Performed by: HOSPITALIST

## 2024-06-19 PROCEDURE — 25000003 PHARM REV CODE 250: Performed by: HOSPITALIST

## 2024-06-19 PROCEDURE — 96361 HYDRATE IV INFUSION ADD-ON: CPT

## 2024-06-19 PROCEDURE — 63600175 PHARM REV CODE 636 W HCPCS: Performed by: HOSPITALIST

## 2024-06-19 PROCEDURE — 80053 COMPREHEN METABOLIC PANEL: CPT | Performed by: HOSPITALIST

## 2024-06-19 PROCEDURE — 94761 N-INVAS EAR/PLS OXIMETRY MLT: CPT

## 2024-06-19 PROCEDURE — 92610 EVALUATE SWALLOWING FUNCTION: CPT

## 2024-06-19 PROCEDURE — 99900031 HC PATIENT EDUCATION (STAT)

## 2024-06-19 PROCEDURE — 85025 COMPLETE CBC W/AUTO DIFF WBC: CPT | Performed by: HOSPITALIST

## 2024-06-19 PROCEDURE — 36415 COLL VENOUS BLD VENIPUNCTURE: CPT | Performed by: HOSPITALIST

## 2024-06-19 RX ORDER — ENOXAPARIN SODIUM 100 MG/ML
40 INJECTION SUBCUTANEOUS EVERY 24 HOURS
Status: DISCONTINUED | OUTPATIENT
Start: 2024-06-20 | End: 2024-06-21 | Stop reason: HOSPADM

## 2024-06-19 RX ORDER — METOPROLOL TARTRATE 25 MG/1
12.5 TABLET ORAL 2 TIMES DAILY
Status: DISCONTINUED | OUTPATIENT
Start: 2024-06-19 | End: 2024-06-21 | Stop reason: HOSPADM

## 2024-06-19 RX ORDER — ZIPRASIDONE MESYLATE 20 MG/ML
10 INJECTION, POWDER, LYOPHILIZED, FOR SOLUTION INTRAMUSCULAR ONCE
Status: DISCONTINUED | OUTPATIENT
Start: 2024-06-19 | End: 2024-06-21 | Stop reason: HOSPADM

## 2024-06-19 RX ORDER — QUETIAPINE FUMARATE 25 MG/1
50 TABLET, FILM COATED ORAL
Status: DISCONTINUED | OUTPATIENT
Start: 2024-06-19 | End: 2024-06-21 | Stop reason: HOSPADM

## 2024-06-19 RX ADMIN — PIPERACILLIN SODIUM AND TAZOBACTAM SODIUM 3.38 G: 3; .375 INJECTION, POWDER, LYOPHILIZED, FOR SOLUTION INTRAVENOUS at 08:06

## 2024-06-19 RX ADMIN — SODIUM CHLORIDE: 0.45 INJECTION, SOLUTION INTRAVENOUS at 01:06

## 2024-06-19 RX ADMIN — METOPROLOL TARTRATE 12.5 MG: 25 TABLET, FILM COATED ORAL at 08:06

## 2024-06-19 RX ADMIN — VANCOMYCIN HYDROCHLORIDE 1750 MG: 5 INJECTION, POWDER, LYOPHILIZED, FOR SOLUTION INTRAVENOUS at 09:06

## 2024-06-19 RX ADMIN — METOPROLOL TARTRATE 12.5 MG: 25 TABLET, FILM COATED ORAL at 01:06

## 2024-06-19 RX ADMIN — PIPERACILLIN SODIUM AND TAZOBACTAM SODIUM 3.38 G: 3; .375 INJECTION, POWDER, LYOPHILIZED, FOR SOLUTION INTRAVENOUS at 04:06

## 2024-06-19 RX ADMIN — Medication 800 MG: at 04:06

## 2024-06-19 RX ADMIN — QUETIAPINE 50 MG: 25 TABLET ORAL at 05:06

## 2024-06-19 RX ADMIN — HEPARIN SODIUM 5000 UNITS: 5000 INJECTION INTRAVENOUS; SUBCUTANEOUS at 07:06

## 2024-06-19 RX ADMIN — HALOPERIDOL LACTATE 2 MG: 5 INJECTION, SOLUTION INTRAMUSCULAR at 08:06

## 2024-06-19 RX ADMIN — MUPIROCIN 1 G: 20 OINTMENT TOPICAL at 08:06

## 2024-06-19 RX ADMIN — MUPIROCIN 1 G: 20 OINTMENT TOPICAL at 09:06

## 2024-06-19 RX ADMIN — Medication 800 MG: at 11:06

## 2024-06-19 RX ADMIN — PIPERACILLIN SODIUM AND TAZOBACTAM SODIUM 3.38 G: 3; .375 INJECTION, POWDER, LYOPHILIZED, FOR SOLUTION INTRAVENOUS at 11:06

## 2024-06-19 NOTE — ED PROVIDER NOTES
Encounter Date: 6/18/2024       History     Chief Complaint   Patient presents with    Altered Mental Status     Pt had a recent pacemaker placed and was sent back to nursing home today. Pt was up eating and started to vomit then went unresponsive. Upon arrival pt AMS with coffee ground like emesis around his mouth. Per ems pt room air sats 90%. 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  He was reportedly found unresponsive and it appeared that he had vomited potentially coffee-ground emesis.  Oxygen saturation low 90s with EMS.  Moans in response to verbal stimuli.  Per chart review, patient was having problems with confusion and agitation following the procedure, requiring p.r.n. Haldol and scheduled Seroquel which was increased to 50 mg nightly.  It appears that he has not received any sedating type medications since last night.  Brother arrives and states when he saw him earlier today he was more alert and seemed to be improving.  Review of patient's allergies indicates:   Allergen Reactions    No known drug allergies      Past Medical History:   Diagnosis Date    BPH (benign prostatic hypertrophy)     Bradycardia     Dementia     Mobitz (type) I (Wenckebach's) atrioventricular block     Poor historian     Scrotal mass     Wears glasses      Past Surgical History:   Procedure Laterality Date    A-V CARDIAC PACEMAKER INSERTION N/A 6/12/2024    Procedure: INSERTION, CARDIAC PACEMAKER, DUAL CHAMBER;  Surgeon: Arie Jimenez MD;  Location: [facility] CATH/EP LAB;  Service: Cardiology;  Laterality: N/A;    EYE SURGERY  BILAT CATARACT WITH LENS    PROSTATE SURGERY  2009    TONSILLECTOMY       Family History   Problem Relation Name Age of Onset    Prostate  cancer Neg Hx      Urolithiasis Neg Hx      Kidney cancer Neg Hx       Social History     Tobacco Use    Smoking status: Never    Smokeless tobacco: Never   Substance Use Topics    Alcohol use: No    Drug use: No     Review of Systems   Unable to perform ROS: Mental status change       Physical Exam     Initial Vitals   BP Pulse Resp Temp SpO2   06/18/24 1910 06/18/24 1910 06/18/24 1910 06/18/24 1926 06/18/24 1910   (!) 88/52 81 (!) 24 (!) 101.9 °F (38.8 °C) (!) 90 %      MAP       --                Physical Exam    Nursing note and vitals reviewed.  Constitutional: No distress.   Elderly/frail, chronically ill-appearing   HENT:   Head: Normocephalic and atraumatic.   Nose: Nose normal.   Eyes: Conjunctivae and EOM are normal. Pupils are equal, round, and reactive to light.   Neck: Neck supple.   Normal range of motion.  Cardiovascular:  Normal rate and regular rhythm.           Pulmonary/Chest: Breath sounds normal. No respiratory distress.   Right chest wall with ecchymosis surrounding the pacer.  Pacemaker incision without erythema or drainage noted.   Abdominal: Abdomen is soft. There is no abdominal tenderness. There is no rebound and no guarding.   Genitourinary:    Genitourinary Comments: Brown stool present on JACK.  Hemoccult negative.     Musculoskeletal:         General: No tenderness or edema. Normal range of motion.      Cervical back: Normal range of motion and neck supple.     Neurological: No cranial nerve deficit. GCS eye subscore is 4. GCS verbal subscore is 5. GCS motor subscore is 6.   Lethargic, moans in response to verbal stimuli.  Non comprehensible speech.  Withdraws to pain in all extremities.   Skin: Skin is warm and dry. Capillary refill takes less than 2 seconds. No rash noted.   Psychiatric:   Altered         ED Course   Procedures  Labs Reviewed   CBC W/ AUTO DIFFERENTIAL - Abnormal; Notable for the following components:       Result Value    RBC 4.37 (*)      (*)     MCH 33.6 (*)      Platelets 128 (*)     Immature Granulocytes 1.3 (*)     Immature Grans (Abs) 0.07 (*)     Lymph # 0.9 (*)     Lymph % 16.9 (*)     Mono % 16.7 (*)     All other components within normal limits   URINALYSIS, REFLEX TO URINE CULTURE - Abnormal; Notable for the following components:    Protein, UA Trace (*)     Occult Blood UA 2+ (*)     All other components within normal limits    Narrative:     Specimen Source->Urine   B-TYPE NATRIURETIC PEPTIDE - Abnormal; Notable for the following components:     (*)     All other components within normal limits   URINALYSIS MICROSCOPIC - Abnormal; Notable for the following components:    RBC, UA 13 (*)     All other components within normal limits    Narrative:     Specimen Source->Urine   COMPREHENSIVE METABOLIC PANEL - Abnormal; Notable for the following components:    Sodium 132 (*)     CO2 20 (*)     BUN 30 (*)     Calcium 7.5 (*)     Total Protein 4.7 (*)     Albumin 2.8 (*)     Alkaline Phosphatase 51 (*)     Anion Gap 7 (*)     All other components within normal limits   TROPONIN I HIGH SENSITIVITY - Abnormal; Notable for the following components:    Troponin I High Sensitivity 18.1 (*)     All other components within normal limits   LIPASE - Abnormal; Notable for the following components:    Lipase 65 (*)     All other components within normal limits   CULTURE, BLOOD    Narrative:     Aerobic and anaerobic  Collection has been rescheduled by 2MB at 06/18/2024 19:39 Reason:   Patient unavailable  Collection has been rescheduled by 2MB at 06/18/2024 19:39 Reason:   Patient unavailable   CULTURE, BLOOD    Narrative:     Aerobic and anaerobic  Collection has been rescheduled by 2MB at 06/18/2024 19:39 Reason:   Patient unavailable  Collection has been rescheduled by 2MB at 06/18/2024 19:39 Reason:   Patient unavailable   PROTIME-INR   APTT   PROCALCITONIN   OCCULT BLOOD X 1, STOOL   MAGNESIUM   LACTIC ACID, PLASMA   COMPREHENSIVE METABOLIC PANEL   MAGNESIUM   CBC W/  AUTO DIFFERENTIAL   TYPE & SCREEN   ISTAT CREATININE   ISTAT LACTATE   POCT LACTATE     EKG Readings: (Independently Interpreted)   Atrial sensed ventricular paced rhythm with heart rate of 84.       Imaging Results              CT Chest Abdomen Pelvis With IV Contrast (XPD) NO Oral Contrast (Final result)  Result time 06/18/24 20:43:25      Final result by Rojelio Golden MD (06/18/24 20:43:25)                   Impression:      Right basilar pneumonia and atelectasis.    Large volume stool throughout the colon and rectum with associated rectal wall thickening. Correlate for stercoral proctocolitis.    Severe narrowing of a 5 cm segment of sigmoid colon may represent stricture or neoplasm.  Follow-up colonoscopy is advised.      Electronically signed by: Rojelio Golden  Date:    06/18/2024  Time:    20:43               Narrative:    EXAMINATION:  CT CHEST ABDOMEN PELVIS WITH IV CONTRAST (XPD)    CLINICAL HISTORY:  Sepsis;    TECHNIQUE:  Low dose axial images, sagittal and coronal reformations were obtained from the thoracic inlet to the pubic symphysis following the IV administration of 100 mL of Omnipaque 350.  Oral contrast was not administered.    COMPARISON:  None.    FINDINGS:  Base of Neck: No significant abnormality.    Thoracic soft tissues: Unremarkable.    Aorta: Atherosclerosis.  No acute findings.    Heart: Small pericardial effusion.    Pulmonary vasculature: Normal caliber.    Shayy/Mediastinum: Nonspecific mildly prominent mediastinal lymphadenopathy.    Airways: Patent.    Lungs/Pleura: Right basilar pneumonia and atelectasis.    Esophagus: Unremarkable.    Liver: Normal size and attenuation. No focal lesions.    Gallbladder: No calcified gallstones.    Bile Ducts: No dilatation.    Pancreas: No mass. No peripancreatic fat stranding.    Spleen: Normal.    Adrenals: Normal.    Kidneys/Ureters: Bilateral parapelvic cyst.  No acute findings.    Bladder: No wall thickening.    Reproductive  organs: Normal.    GI Tract/Mesentery: Large volume stool throughout the colon and rectum with associated rectal wall thickening.  Correlate for stercoral proctocolitis.  Severe narrowing of a 5 cm segment of sigmoid colon may represent stricture or neoplasm.    Peritoneal Space: No ascites or free air.    Retroperitoneum: No significant adenopathy.    Abdominal wall: Fat containing right inguinal hernia.    Vasculature: No aneurysm.    Bones: No acute fracture. No suspicious lytic or sclerotic lesions.                                       CT Head Without Contrast (Final result)  Result time 06/18/24 20:12:14      Final result by Rojelio Golden MD (06/18/24 20:12:14)                   Impression:      No acute abnormality.      Electronically signed by: Rojelio Golden  Date:    06/18/2024  Time:    20:12               Narrative:    EXAMINATION:  CT HEAD WITHOUT CONTRAST    CLINICAL HISTORY:  Mental status change, unknown cause;    TECHNIQUE:  Low dose axial CT images obtained throughout the head without intravenous contrast. Sagittal and coronal reconstructions were performed.    COMPARISON:  05/18/2024    FINDINGS:  Intracranial compartment:    Ventricles and sulci are normal in size for age without evidence of hydrocephalus. No extra-axial blood or fluid collections.    Moderate chronic microvascular ischemia.  No parenchymal mass, hemorrhage, edema or major vascular distribution infarct.    Skull/extracranial contents (limited evaluation): No fracture.  Right sphenoid sinus disease.  Mastoid air cells and paranasal sinuses are otherwise essentially clear.                                       X-Ray Chest AP Portable (Final result)  Result time 06/18/24 19:49:38      Final result by Rojelio Golden MD (06/18/24 19:49:38)                   Impression:      Right basilar pneumonia.      Electronically signed by: Rojelio Golden  Date:    06/18/2024  Time:    19:49               Narrative:     EXAMINATION:  XR CHEST AP PORTABLE    CLINICAL HISTORY:  altered mental status;    TECHNIQUE:  Single frontal view of the chest was performed.    COMPARISON:  06/17/2024    FINDINGS:  Mild right basilar pneumonia and atelectasis.  Left lung is clear.  Normal heart size.  Right chest wall pacemaker.                                       Medications   sodium chloride 0.9% flush 10 mL (has no administration in time range)   melatonin tablet 6 mg (has no administration in time range)   ondansetron injection 4 mg (has no administration in time range)   senna-docusate 8.6-50 mg per tablet 1 tablet (has no administration in time range)   acetaminophen tablet 650 mg (has no administration in time range)   aluminum-magnesium hydroxide-simethicone 200-200-20 mg/5 mL suspension 30 mL (has no administration in time range)   acetaminophen tablet 650 mg (has no administration in time range)   HYDROcodone-acetaminophen 5-325 mg per tablet 1 tablet (has no administration in time range)   naloxone 0.4 mg/mL injection 0.02 mg (has no administration in time range)   potassium bicarbonate disintegrating tablet 50 mEq (has no administration in time range)   potassium bicarbonate disintegrating tablet 35 mEq (has no administration in time range)   potassium bicarbonate disintegrating tablet 60 mEq (has no administration in time range)   magnesium oxide tablet 800 mg (has no administration in time range)   magnesium oxide tablet 800 mg (has no administration in time range)   potassium, sodium phosphates 280-160-250 mg packet 2 packet (has no administration in time range)   potassium, sodium phosphates 280-160-250 mg packet 2 packet (has no administration in time range)   potassium, sodium phosphates 280-160-250 mg packet 2 packet (has no administration in time range)   glucose chewable tablet 16 g (has no administration in time range)   glucose chewable tablet 24 g (has no administration in time range)   dextrose 50% injection 12.5 g (has  no administration in time range)   dextrose 50% injection 25 g (has no administration in time range)   glucagon (human recombinant) injection 1 mg (has no administration in time range)   0.45% NaCl infusion ( Intravenous New Bag 6/18/24 2313)   heparin (porcine) injection 5,000 Units (5,000 Units Subcutaneous Given 6/18/24 2306)   mupirocin 2 % ointment (1 g Nasal Given 6/18/24 2304)   piperacillin-tazobactam 3.375 g in dextrose 5 % 100 mL IVPB (ready to mix) (has no administration in time range)   vancomycin - pharmacy to dose (has no administration in time range)   haloperidol lactate injection 2 mg (has no administration in time range)   vancomycin (VANCOCIN) 1,750 mg in dextrose 5 % (D5W) 500 mL IVPB (1,750 mg Intravenous Trough Due As Scheduled Before Dose 6/20/24 2000)   sodium chloride 0.9% bolus 1,000 mL 1,000 mL (0 mLs Intravenous Stopped 6/18/24 2037)   sodium chloride 0.9% bolus 1,000 mL 1,000 mL (0 mLs Intravenous Stopped 6/18/24 2056)   pantoprazole injection 80 mg (80 mg Intravenous Given 6/18/24 2014)   acetaminophen suppository 650 mg (650 mg Rectal Given 6/18/24 1942)   vancomycin (VANCOCIN) 1,750 mg in dextrose 5 % (D5W) 500 mL IVPB (0 mg Intravenous Stopped 6/18/24 2320)   iohexoL (OMNIPAQUE 350) injection 100 mL (100 mLs Intravenous Given 6/18/24 1952)     Medical Decision Making  Patient presents to ED as above.  Differential includes but not limited to acute stroke, GI bleed, metabolic derangement, encephalitis, polypharmacy, sepsis.  Noted to be febrile here to 101.9.  Blood pressure soft with systolics of 80s to 90s.  Sepsis protocol initiated in precaution.  Patient received 2 L normal saline bolus with adequate response and blood pressure 2 map greater than 65.  Received dose of IV Zosyn and IV vancomycin.  Given rectal Tylenol.  All labs reviewed by me and significant for normal WBC count, normal hemoglobin, platelets 128 K, sodium 132, mild creatinine bump, elevated high sensitivity  "troponin which is around baseline, lactic acid 2.  UA not suggestive of UTI.  Chest x-ray independently reviewed by me.  No obvious focal infiltrate per my read.  CT brain, CT chest/abdomen/pelvis with IV contrast obtained for further evaluation.  Radiology read, there is no acute intracranial process.  There is a right basilar infiltrate.  Large volume stool throughout the colon and rectum, correlate for stercoral proctocolitis.  Will admit to hospitalist for further management and workup.    Amount and/or Complexity of Data Reviewed  Independent Historian: EMS  External Data Reviewed: notes.  Labs: ordered. Decision-making details documented in ED Course.  Radiology: ordered and independent interpretation performed. Decision-making details documented in ED Course.  ECG/medicine tests: ordered and independent interpretation performed. Decision-making details documented in ED Course.    Risk  OTC drugs.  Prescription drug management.  Decision regarding hospitalization.               Sepsis Perfusion Assessment: "I attest a sepsis perfusion exam was performed within 6 hours of sepsis, severe sepsis, or septic shock presentation, following fluid resuscitation."           Attending Critical Care:   Critical Care Times:   Direct Patient Care (initial evaluation, reassessments, and time considering the case)................................................................10 minutes.   Additional History from reviewing old medical records or taking additional history from the family, EMS, PCP, etc.......................10 minutes.   Ordering, Reviewing, and Interpreting Diagnostic Studies...............................................................................................................10 minutes.   Documentation..................................................................................................................................................................................5 minutes. " "  ==============================================================  · Total Critical Care Time - exclusive of procedural time: 35 minutes.  ==============================================================  Critical care was necessary to treat or prevent imminent or life-threatening deterioration of the following conditions: severe sepsis.   Critical care was time spent personally by me on the following activities: obtaining history from patient or relative, examination of patient, review of x-rays / CT sent with the patient, ordering lab, x-rays, and/or EKG, development of treatment plan with patient or relative, ordering and performing treatments and interventions, interpretation of cardiac measurements and re-evaluation of patient's conition.   Critical Care Condition: potentially life-threatening          This patient does have evidence of infective focus  My overall impression is sepsis.  Source: Respiratory  Antibiotics given-   Antibiotics (72h ago, onward)      Start     Stop Route Frequency Ordered    06/19/24 2100  vancomycin (VANCOCIN) 1,750 mg in dextrose 5 % (D5W) 500 mL IVPB         -- IV Every 24 hours (non-standard times) 06/18/24 2203    06/19/24 0430  piperacillin-tazobactam 3.375 g in dextrose 5 % 100 mL IVPB (ready to mix)         -- IV Every 8 hours (non-standard times) 06/18/24 2158 06/18/24 2257  vancomycin - pharmacy to dose  (vancomycin IVPB (PEDS and ADULTS))        Placed in "And" Linked Group    -- IV pharmacy to manage frequency 06/18/24 2158 06/18/24 2245  mupirocin 2 % ointment         06/23/24 2059 Nasl 2 times daily 06/18/24 2144          Latest lactate reviewed-  Recent Labs   Lab 06/18/24  1933   POCLAC 2.05     Organ dysfunction indicated by Encephalopathy    Fluid challenge Ideal Body Weight- The patient's ideal body weight is Ideal body weight: 77.6 kg (171 lb 1.2 oz) which will be used to calculate fluid bolus of 30 ml/kg for treatment of septic shock.      Post- resuscitation " assessment Yes Perfusion exam was performed within 6 hours of septic shock presentation after bolus shows Adequate tissue perfusion assessed by non-invasive monitoring       Will Not start Pressors- Levophed for MAP of 65  Source control achieved by: cultures, broad spectrum antibiotics        Clinical Impression:  Final diagnoses:  [R41.82] AMS (altered mental status)  [A41.9, R65.20] Severe sepsis (Primary)  [R11.2] Nausea and vomiting, unspecified vomiting type          ED Disposition Condition    Admit                 Luz Maria Stern MD  06/19/24 0402       Luz Maria Stern MD  06/19/24 0404

## 2024-06-19 NOTE — CARE UPDATE
06/19/24 0510   Patient Assessment/Suction   Level of Consciousness (AVPU) responds to voice   Respiratory Effort Unlabored   Expansion/Accessory Muscles/Retractions expansion symmetric   All Lung Fields Breath Sounds coarse;diminished   Rhythm/Pattern, Respiratory depth regular;pattern regular   Cough Frequency infrequent   Cough Type fair;nonproductive   PRE-TX-O2   Device (Oxygen Therapy) nasal cannula   $ Is the patient on Low Flow Oxygen? Yes   Flow (L/min) (Oxygen Therapy) 2   SpO2 99 %   Pulse Oximetry Type Continuous   $ Pulse Oximetry - Multiple Charge Pulse Oximetry - Multiple   Education   $ Education DME Oxygen;15 min

## 2024-06-19 NOTE — PT/OT/SLP EVAL
Speech Language Pathology Evaluation  Bedside Swallow    Patient Name:  Elpidio Ayala   MRN:  [id number]  Admitting Diagnosis: <principal problem not specified>    Recommendations:                 General Recommendations:  Follow-up not indicated  Diet recommendations:  Soft & Bite Sized Diet - IDDSI Level 6 (to promote self feeding), Thin   Aspiration Precautions:  set up assist, may require supervision 2° cognition and Standard aspiration precautions   General Precautions: Standard, fall      Assessment:     Elpidio Ayala is a 89 y.o. male with an admitting diagnosis of severe sepsis. Clinical swallowing evaluation completed. All po trials consumed with functional oral phase and no overt s/s airway compromise. REC Soft & Bite-Sized (IDDSI 6) textures, to promote self feeing, with thin liquids. No f/u indicated ATT. Please re consult PRN.      History:   PER HPI: History is taken from medical records and medical personnel because the patient is confused.  This is an 89-year-old male who recently had a pacemaker placement.  During the hospital stay the patient was confused and was managed with quetiapine and Haldol.  Patient also had pacemaker reassess and appear to be in the right position.  He was discharged to a skilled nursing facility but they noted that the patient was more somnolent and more confused.  Patient was sent back to the hospital where it is noted the patient is running a fever.  Patient also had low blood pressure.  Patient was diagnosed with sepsis and antibiotics with fluid was provided.  Hospitalist will readmit this patient and continue antibiotics     Past Medical History:   Diagnosis Date    BPH (benign prostatic hypertrophy)     Bradycardia     Dementia     Mobitz (type) I (Wenckebach's) atrioventricular block     Poor historian     Scrotal mass     Wears glasses        Past Surgical History:   Procedure Laterality Date    A-V CARDIAC PACEMAKER INSERTION N/A 6/12/2024    Procedure:  INSERTION, CARDIAC PACEMAKER, DUAL CHAMBER;  Surgeon: Arie Jimenez MD;  Location: [facility] CATH/EP LAB;  Service: Cardiology;  Laterality: N/A;    EYE SURGERY  BILAT CATARACT WITH LENS    PROSTATE SURGERY  2009    TONSILLECTOMY         Social History: Patient resides at [facility].    Prior Intubation HX:  None this admit    Modified Barium Swallow: None in Epic    Imaging:  Imaging Results              CT Chest Abdomen Pelvis With IV Contrast (XPD) NO Oral Contrast (Final result)  Result time 06/18/24 20:43:25      Final result by Rojelio Golden MD (06/18/24 20:43:25)                   Impression:      Right basilar pneumonia and atelectasis.    Large volume stool throughout the colon and rectum with associated rectal wall thickening. Correlate for stercoral proctocolitis.    Severe narrowing of a 5 cm segment of sigmoid colon may represent stricture or neoplasm.  Follow-up colonoscopy is advised.      Electronically signed by: Rojelio Golden  Date:    06/18/2024  Time:    20:43               Narrative:    EXAMINATION:  CT CHEST ABDOMEN PELVIS WITH IV CONTRAST (XPD)    CLINICAL HISTORY:  Sepsis;    TECHNIQUE:  Low dose axial images, sagittal and coronal reformations were obtained from the thoracic inlet to the pubic symphysis following the IV administration of 100 mL of Omnipaque 350.  Oral contrast was not administered.    COMPARISON:  None.    FINDINGS:  Base of Neck: No significant abnormality.    Thoracic soft tissues: Unremarkable.    Aorta: Atherosclerosis.  No acute findings.    Heart: Small pericardial effusion.    Pulmonary vasculature: Normal caliber.    Shayy/Mediastinum: Nonspecific mildly prominent mediastinal lymphadenopathy.    Airways: Patent.    Lungs/Pleura: Right basilar pneumonia and atelectasis.    Esophagus: Unremarkable.    Liver: Normal size and attenuation. No focal lesions.    Gallbladder: No calcified gallstones.    Bile Ducts: No dilatation.    Pancreas: No mass. No  peripancreatic fat stranding.    Spleen: Normal.    Adrenals: Normal.    Kidneys/Ureters: Bilateral parapelvic cyst.  No acute findings.    Bladder: No wall thickening.    Reproductive organs: Normal.    GI Tract/Mesentery: Large volume stool throughout the colon and rectum with associated rectal wall thickening.  Correlate for stercoral proctocolitis.  Severe narrowing of a 5 cm segment of sigmoid colon may represent stricture or neoplasm.    Peritoneal Space: No ascites or free air.    Retroperitoneum: No significant adenopathy.    Abdominal wall: Fat containing right inguinal hernia.    Vasculature: No aneurysm.    Bones: No acute fracture. No suspicious lytic or sclerotic lesions.                                       CT Head Without Contrast (Final result)  Result time 06/18/24 20:12:14      Final result by Rojelio Golden MD (06/18/24 20:12:14)                   Impression:      No acute abnormality.      Electronically signed by: Rojelio Golden  Date:    06/18/2024  Time:    20:12               Narrative:    EXAMINATION:  CT HEAD WITHOUT CONTRAST    CLINICAL HISTORY:  Mental status change, unknown cause;    TECHNIQUE:  Low dose axial CT images obtained throughout the head without intravenous contrast. Sagittal and coronal reconstructions were performed.    COMPARISON:  05/18/2024    FINDINGS:  Intracranial compartment:    Ventricles and sulci are normal in size for age without evidence of hydrocephalus. No extra-axial blood or fluid collections.    Moderate chronic microvascular ischemia.  No parenchymal mass, hemorrhage, edema or major vascular distribution infarct.    Skull/extracranial contents (limited evaluation): No fracture.  Right sphenoid sinus disease.  Mastoid air cells and paranasal sinuses are otherwise essentially clear.                                       X-Ray Chest AP Portable (Final result)  Result time 06/18/24 19:49:38      Final result by Rojelio Golden MD (06/18/24  "19:49:38)                   Impression:      Right basilar pneumonia.      Electronically signed by: HaArie Chantel  Date:    06/18/2024  Time:    19:49               Narrative:    EXAMINATION:  XR CHEST AP PORTABLE    CLINICAL HISTORY:  altered mental status;    TECHNIQUE:  Single frontal view of the chest was performed.    COMPARISON:  06/17/2024    FINDINGS:  Mild right basilar pneumonia and atelectasis.  Left lung is clear.  Normal heart size.  Right chest wall pacemaker.                                       Prior diet: Regular/thin per brother.      Subjective     Brother shares, "His highlight of the day was to go out for breakfast and dinner."   Pt without complaints.     Respiratory Status: Nasal cannula, flow 2 L/min    Objective:   Pt seen for clinical swallow evaluation. He is alert and cooperative. Pleasantly confused. Oriented tot self only. Able to follow simple commands. Brother at bedside.     Oral Musculature Evaluation  Oral Musculature: WFL  Dentition: present and adequate  Secretion Management: adequate  Mucosal Quality: adequate  Mandibular Strength and Mobility: WFL  Oral Labial Strength and Mobility: WFL  Lingual Strength and Mobility: WFL  Volitional Swallow: able to palpate larygneal rise  Voice Prior to PO Intake: clear    Bedside Swallow Eval:   Consistencies Assessed:  Thin liquids --via cup and straw; self fed  Puree --applesauce  Mixed consistencies --diced peaches  Solids --ashleigh crackers      Oral Phase:   WFL    Pharyngeal Phase:   no overt clinical signs/symptoms of aspiration    Compensatory Strategies  None    Treatment: Pt/family educated re: results/recs of evaluation and aspiration precautions.        Plan:     Patient to be seen:      Plan of Care expires:     Plan of Care reviewed with:  patient, sibling   SLP Follow-Up:  No       Discharge recommendations:  No Therapy Indicated   Barriers to Discharge:  Safety Awareness .    Time Tracking:     SLP Treatment Date:   " 06/19/24  Speech Start Time:  1449  Speech Stop Time:  1502     Speech Total Time (min):  13 min    Billable Minutes: Eval Swallow and Oral Function 13 and Total Time 13    06/19/2024

## 2024-06-19 NOTE — HPI
History is taken from medical records and medical personnel because the patient is confused.  This is an 89-year-old male who recently had a pacemaker placement.  During the hospital stay the patient was confused and was managed with quetiapine and Haldol.  Patient also had pacemaker reassess and appear to be in the right position.  He was discharged to a skilled nursing facility but they noted that the patient was more somnolent and more confused.  Patient was sent back to the hospital where it is noted the patient is running a fever.  Patient also had low blood pressure.  Patient was diagnosed with sepsis and antibiotics with fluid was provided.  Hospitalist will readmit this patient and continue antibiotics.

## 2024-06-19 NOTE — HOSPITAL COURSE
Patient was discharged yesterday after PPM placed and upon return to living facility he was noted to have a fever and sent back to hospital for further evaluation. There is question whether he had an episode of emesis upon returning to facility. On work up CXR suggest pneumonia. Cultures NGTD He was monitored in ICU overnight due to severe sepsis and hypotension. BP improved and stable. He is on zosyn. Vanc dc'd. PT/OT/ST consulted. He is a full code. Ok to transfer to telemetry for further management.     Patient is stable on room air. Cultures remain negative. Will transition to oral augmentin x 5 more days to complete 1 week total. Notified facility and will discharge today.

## 2024-06-19 NOTE — ASSESSMENT & PLAN NOTE
Source unclear.  Chest x-ray equivocal for pneumonia.  Evaluation of pacemaker site appears to be clean.    IV fluid bolus administered   Continue IV fluid while NPO - then dc once tolerating a diet   Continue vancomycin and Zosyn   Follow cultures  Procal pending

## 2024-06-19 NOTE — PLAN OF CARE
Safety precautions remain in effect. Patient unable to demonstrate use of call light.  Vital signs stable, no fever. Cardiac monitor showing SR/paced.  Dressing on right shoulder pacer site dry and intact. Area bruised, soft, tender to touch. No drainage observed.  Lung sounds diminished throughout.  No bleeding observed.

## 2024-06-19 NOTE — SUBJECTIVE & OBJECTIVE
Past Medical History:   Diagnosis Date    BPH (benign prostatic hypertrophy)     Bradycardia     Dementia     Mobitz (type) I (Wenckebach's) atrioventricular block     Poor historian     Scrotal mass     Wears glasses        Past Surgical History:   Procedure Laterality Date    A-V CARDIAC PACEMAKER INSERTION N/A 6/12/2024    Procedure: INSERTION, CARDIAC PACEMAKER, DUAL CHAMBER;  Surgeon: Arie Jimenez MD;  Location: [facility] CATH/EP LAB;  Service: Cardiology;  Laterality: N/A;    EYE SURGERY  BILAT CATARACT WITH LENS    PROSTATE SURGERY  2009    TONSILLECTOMY         Review of patient's allergies indicates:   Allergen Reactions    No known drug allergies        Current Facility-Administered Medications on File Prior to Encounter   Medication    [DISCONTINUED] acetaminophen tablet 650 mg    [DISCONTINUED] haloperidol lactate injection 5 mg    [DISCONTINUED] heparin (porcine) injection 5,000 Units    [DISCONTINUED] metoprolol tartrate (LOPRESSOR) split tablet 12.5 mg    [DISCONTINUED] ondansetron injection 4 mg    [DISCONTINUED] QUEtiapine tablet 50 mg     Current Outpatient Medications on File Prior to Encounter   Medication Sig    cyanocobalamin 500 MCG tablet Take 500 mcg by mouth once daily.    metoprolol tartrate (LOPRESSOR) 25 MG tablet Take 0.5 tablets (12.5 mg total) by mouth 2 (two) times daily. (Patient taking differently: Take 12.5 mg by mouth 2 (two) times daily. NEW Rx - NOT YET STARTED)    QUEtiapine (SEROQUEL) 50 MG tablet Take 1 tablet (50 mg total) by mouth after dinner. (Patient taking differently: Take 50 mg by mouth after dinner. NEW Rx - NOT YET STARTED)     Family History    None       Tobacco Use    Smoking status: Never    Smokeless tobacco: Never   Substance and Sexual Activity    Alcohol use: No    Drug use: No    Sexual activity: Not on file     Review of Systems   Reason unable to perform ROS: Unable to evaluate because the patient is confused.     Objective:     Vital Signs (Most  Recent):  Temp: (!) 101.9 °F (38.8 °C) (06/18/24 1942)  Pulse: 79 (06/18/24 2130)  Resp: 18 (06/18/24 2130)  BP: (!) 102/57 (06/18/24 2130)  SpO2: (!) 94 % (06/18/24 2130) Vital Signs (24h Range):  Temp:  [97.3 °F (36.3 °C)-101.9 °F (38.8 °C)] 101.9 °F (38.8 °C)  Pulse:  [] 79  Resp:  [17-24] 18  SpO2:  [90 %-96 %] 94 %  BP: ()/(52-84) 102/57     Weight: 84.4 kg (186 lb)  Body mass index is 25.23 kg/m².     Physical Exam  Constitutional:       Appearance: Normal appearance.   HENT:      Head: Normocephalic and atraumatic.      Nose: Nose normal.   Eyes:      Pupils: Pupils are equal, round, and reactive to light.   Cardiovascular:      Rate and Rhythm: Normal rate and regular rhythm.   Pulmonary:      Effort: Pulmonary effort is normal.   Abdominal:      Palpations: Abdomen is soft.   Musculoskeletal:      Cervical back: Neck supple.   Skin:     General: Skin is warm.   Neurological:      Mental Status: He is alert. He is disoriented.              CRANIAL NERVES     CN III, IV, VI   Pupils are equal, round, and reactive to light.       Significant Labs: All pertinent labs within the past 24 hours have been reviewed.  Recent Lab Results  (Last 5 results in the past 24 hours)        06/18/24 2019 06/18/24 2010 06/18/24 1933 06/18/24  1932 06/18/24 1923        Procalcitonin   0.085  Comment: The Procalcitonin test is intended to aid in the risk assessment of   critically ill patients on their first day of ICU admission for   progression   to severe sepsis and septic shock.    A result of <0.50 ng/mL is associated with a low risk of severe   sepsis   and/or septic shock.  This does not exclude localized infection.    A result between 0.50 ng/mL and 2.0 ng/mL should be interpreted with   consideration of the patient's history and is recommended to retest   within 6   to 24 hours.        A result of >2.0 ng/mL is associated with a high risk of severe   sepsis   and/or septic shock.    Procalcitonin  may not be accurate among patients with   localized  infection, recent trauma or major surgery, immunosuppressed   state,  invasive fungal infection, renal dysfunction. Decisions   regarding  initiation or continuation of antibiotic therapy should not be   based  solely on procalcitonin levels.               Albumin 2.8               ALP 51               ALT 12               Anion Gap 7               AST 20               BILIRUBIN TOTAL 1.0  Comment: For infants and newborns, interpretation of results should be based  on gestational age, weight and in agreement with clinical  observations.    Premature Infant recommended reference ranges:  Up to 24 hours.............<8.0 mg/dL  Up to 48 hours............<12.0 mg/dL  3-5 days..................<15.0 mg/dL  6-29 days.................<15.0 mg/dL                 BUN 30               Calcium 7.5               Chloride 105               CO2 20               Creatinine 1.1               eGFR >60.0               Glucose 102               Lipase 65               Magnesium  1.6               Occult Blood       Negative         POC Creatinine         1.3       POC Lactate     2.05           Potassium 4.4               PROTEIN TOTAL 4.7               Sample     VENOUS     VENOUS       Sodium 132               Troponin I High Sensitivity 18.1  Comment: Troponin results differ between methods. Do not use   results between Troponin methods interchangeably.    Alkaline Phospatase levels above 400 U/L may   cause false positive results.    Access hsTnI should not be used for patients taking   Asfotase airam (Strensiq).                                        Significant Imaging: I have reviewed all pertinent imaging results/findings within the past 24 hours.

## 2024-06-19 NOTE — PROGRESS NOTES
[facility]  Progress Note    Patient Name: Elpidio Ayala  MRN: [id number]  Patient Class: IP- Inpatient   Admission Date: 6/18/2024  Length of Stay: 1 days  Attending Physician: Myra Lawrence MD  Primary Care Provider: [place], Primary Doctor        Subjective:     Principal Problem:<principal problem not specified>        HPI:  History is taken from medical records and medical personnel because the patient is confused.  This is an 89-year-old male who recently had a pacemaker placement.  During the hospital stay the patient was confused and was managed with quetiapine and Haldol.  Patient also had pacemaker reassess and appear to be in the right position.  He was discharged to a skilled nursing facility but they noted that the patient was more somnolent and more confused.  Patient was sent back to the hospital where it is noted the patient is running a fever.  Patient also had low blood pressure.  Patient was diagnosed with sepsis and antibiotics with fluid was provided.  Hospitalist will readmit this patient and continue antibiotics.    Overview/Hospital Course:  Patient was discharged yesterday after PPM placed and upon return to living facility he was noted to have a fever and sent back to hospital for further evaluation. There is question whether he had an episode of emesis upon returning to facility. On work up CXR suggest pneumonia. Cultures pending. He was monitored in ICU overnight due to severe sepsis and hypotension. BP improved and stable. He is on vanc and zosyn. PT/OT/ST consulted. He is a full code. Ok to transfer to telemetry for further management.     Interval History: see hospital course     Review of Systems   Constitutional:  Negative for chills and fever.   Respiratory: Negative.     Cardiovascular: Negative.    Gastrointestinal: Negative.    Neurological: Negative.    Psychiatric/Behavioral:  Positive for confusion.      Objective:     Vital Signs (Most  Recent):  Temp: 98.1 °F (36.7 °C) (06/19/24 1115)  Pulse: 62 (06/19/24 1200)  Resp: 11 (06/19/24 1200)  BP: (!) 141/68 (06/19/24 1200)  SpO2: 99 % (06/19/24 1200) Vital Signs (24h Range):  Temp:  [98.1 °F (36.7 °C)-101.9 °F (38.8 °C)] 98.1 °F (36.7 °C)  Pulse:  [] 62  Resp:  [11-27] 11  SpO2:  [90 %-100 %] 99 %  BP: ()/(50-78) 141/68     Weight: 84 kg (185 lb 3 oz)  Body mass index is 25.12 kg/m².    Intake/Output Summary (Last 24 hours) at 6/19/2024 1218  Last data filed at 6/19/2024 0846  Gross per 24 hour   Intake 3108.75 ml   Output 250 ml   Net 2858.75 ml         Physical Exam  Constitutional:       General: He is not in acute distress.  HENT:      Head: Normocephalic and atraumatic.      Mouth/Throat:      Mouth: Mucous membranes are dry.      Pharynx: Oropharynx is clear.   Eyes:      Extraocular Movements: Extraocular movements intact.      Conjunctiva/sclera: Conjunctivae normal.   Cardiovascular:      Rate and Rhythm: Normal rate and regular rhythm.      Pulses: Normal pulses.      Heart sounds: Murmur heard.   Pulmonary:      Effort: Pulmonary effort is normal. No respiratory distress.      Breath sounds: No wheezing.   Abdominal:      General: Bowel sounds are normal. There is no distension.      Tenderness: There is no abdominal tenderness.   Musculoskeletal:      Cervical back: Normal range of motion and neck supple. No rigidity.   Skin:     General: Skin is warm and dry.      Capillary Refill: Capillary refill takes less than 2 seconds.      Findings: Bruising present.   Neurological:      General: No focal deficit present.      Mental Status: He is alert. Mental status is at baseline.             Significant Labs: All pertinent labs within the past 24 hours have been reviewed.  Recent Lab Results  (Last 5 results in the past 24 hours)        06/19/24  0440   06/19/24  0439   06/19/24  0356   06/18/24 2037 06/18/24 2026        Albumin   3.0             ALP   55             ALT   14              Anion Gap   9             AST   25             Baso # 0.02               Basophil % 0.4               BILIRUBIN TOTAL   1.1  Comment: For infants and newborns, interpretation of results should be based  on gestational age, weight and in agreement with clinical  observations.    Premature Infant recommended reference ranges:  Up to 24 hours.............<8.0 mg/dL  Up to 48 hours............<12.0 mg/dL  3-5 days..................<15.0 mg/dL  6-29 days.................<15.0 mg/dL               Blood Culture, Routine       No Growth to date  [P]   No Growth to date  [P]       BUN   26             Calcium   7.9             Chloride   106             CO2   19             Creatinine   0.9             Differential Method Automated               eGFR   >60.0             Eos # 0.0               Eos % 0.2               Glucose   85             Gran # (ANC) 2.8               Gran % 63.2               Hematocrit 40.8               Hemoglobin 13.3               Immature Grans (Abs) 0.05  Comment: Mild elevation in immature granulocytes is non specific and   can be seen in a variety of conditions including stress response,   acute inflammation, trauma and pregnancy. Correlation with other   laboratory and clinical findings is essential.                 Immature Granulocytes 1.1               Lactic Acid Level     0.9  Comment: Falsely low lactic acid results can be found in samples   containing >=13.0 mg/dL total bilirubin and/or >=3.5 mg/dL   direct bilirubin.             Lymph # 1.0               Lymph % 21.1               Magnesium    1.8             MCH 33.6               MCHC 32.6                              Mono # 0.6               Mono % 14.0               MPV 11.0               nRBC 0               Platelet Count 98               Potassium   4.0             PROTEIN TOTAL   5.1             RBC 3.96               RDW 14.6               Sodium   134             WBC 4.50                                        [P] - Preliminary Result               Significant Imaging: I have reviewed all pertinent imaging results/findings within the past 24 hours.    Assessment/Plan:      Dementia with behavioral disturbance  Supportive care   Haldol p.r.n.  Resume seroquel     Symptomatic bradycardia  Pacemaker in place  Metoprolol 12.5 mg b.i.d.      Pneumonia    Empiric antibiotics  Follow up chest x-ray in a.m.  Wean O2 as tolerated  Nebs PRN  Check procal     Sepsis  Source unclear.  Chest x-ray equivocal for pneumonia.  Evaluation of pacemaker site appears to be clean.    IV fluid bolus administered   Continue IV fluid while NPO - then dc once tolerating a diet   Continue vancomycin and Zosyn   Follow cultures  Procal pending    Encephalopathy, metabolic  Acute on chronic dementia.  To suspected infection        VTE Risk Mitigation (From admission, onward)           Ordered     heparin (porcine) injection 5,000 Units  Every 8 hours         06/18/24 2143     IP VTE HIGH RISK PATIENT  Once         06/18/24 2143     Place sequential compression device  Until discontinued         06/18/24 2143                    Discharge Planning   MARINA: 6/25/2024     Code Status: Full Code   Is the patient medically ready for discharge?:     Reason for patient still in hospital (select all that apply): Patient trending condition               Critical care time spent on the evaluation and treatment of severe organ dysfunction, review of pertinent labs and imaging studies, discussions with consulting providers and discussions with patient/family: 20 minutes.      Myra Lawrence MD  Department of Hospital Medicine   Formerly [facility], 1928–1983

## 2024-06-19 NOTE — NURSING
Nurses Note -- 4 Eyes      6/19/2024   4:45 AM      Skin assessed during: Admit      [x] No Altered Skin Integrity Present    []Prevention Measures Documented      [] Yes- Altered Skin Integrity Present or Discovered   [] LDA Added if Not in Epic (Describe Wound)   [] New Altered Skin Integrity was Present on Admit and Documented in LDA   [] Wound Image Taken    Wound Care Consulted? No    Attending Nurse:  Cristhian Clifton RN/Staff Member:   Arya

## 2024-06-19 NOTE — NURSING
Admitted to room 2217 from ER via stretcher. Assisted to bed placed on cardiac monitor. Oriented to room and unit routine. Patient knows name and date of birth and that he is in [place], but unaware of being in the hospital or why. Believes that he he living with his parents on the [facility] and that he still drives. Historically uses a walker, but he denies needing one. Follows commands.

## 2024-06-19 NOTE — NURSING
Pt attempting to climb out of bed, pt only oriented to self, assisted pt with getting back comfortable in bed.

## 2024-06-19 NOTE — PLAN OF CARE
[facility]  Initial Discharge Assessment       Primary Care Provider: No, Primary Doctor    Admission Diagnosis: Severe sepsis [A41.9, R65.20]    Admission Date: 6/18/2024  Expected Discharge Date: 6/25/2024    Transition of Care Barriers: None    Payor: MEDICARE / Plan: MEDICARE RAILROAD [employer] / Product Type: Government /     Extended Emergency Contact Information  Primary Emergency Contact: Celestine Ayala  Address: UNKNOWN   [place]  Mobile Phone: [phone]  Relation: Brother  Preferred language: English   needed? No    Discharge Plan A: Home Health  Discharge Plan B: Assisted Living      [id number] - [place], [place] - [address]  [address]  [facility] [address]  Phone: [phone] Fax: [phone]    [facility]  [address]  [facility] [address]  Phone: [phone] Fax: [phone]    DC assessment completed with patient's brother, Celestine [phone], over the phone. States pt just moved into [facility] yesterday and will DC there once medically clear. Active with [facility]- wants to continue upon DC. DME- WC, rw, cane. Uses WC- modified independent. Pt does have PCP and has not seen one in very long time. Ok with CM or assisted living getting PCP for pt to follow up with. [facility] [address] Verified insurance on file. DC plan is to return to [facility].     CM spoke Shannan from [facility]. States they need cardiology to see patient because nurse from previous admission said they was something wrong with his pacemaker and wants to speak with actual nurse caring for pt.   Notified providers of above- pacemaker checked and is working. Sent all clinicals and notes to [facility] via [employer]       Initial Assessment (most recent)       Adult Discharge Assessment - 06/19/24 5482          Discharge Assessment    Assessment Type Discharge Planning Assessment     Confirmed/corrected  address, phone number and insurance Yes     Confirmed Demographics Correct on Facesheet     Source of Information family     If unable to respond/provide information was family/caregiver contacted? Yes     Contact Name/Number brothercelestine [phone]     Communicated MARINA with patient/caregiver Yes     Reason For Admission syncope event     People in Home facility resident     Facility Arrived From: Replaced by [facility]     Do you expect to return to your current living situation? Yes     Do you have help at home or someone to help you manage your care at home? Yes     Who are your caregiver(s) and their phone number(s)? staff at assisted living     Prior to hospitilization cognitive status: Not Oriented to Place;Not Oriented to Time     Current cognitive status: Not Oriented to Place;Not Oriented to Time     Walking or Climbing Stairs Difficulty yes     Walking or Climbing Stairs ambulation difficulty, requires equipment     Dressing/Bathing Difficulty yes     Dressing/Bathing bathing difficulty, requires equipment     Equipment Currently Used at Home wheelchair     Readmission within 30 days? Yes     Patient currently being followed by outpatient case management? No     Do you currently have service(s) that help you manage your care at home? Yes     Name and Contact number of agency [facility]     Is the pt/caregiver preference to resume services with current agency Yes     Do you take prescription medications? Yes     Do you have prescription coverage? Yes     Do you have any problems affording any of your prescribed medications? No     Is the patient taking medications as prescribed? yes     Who is going to help you get home at discharge? Celestine (brother)     How do you get to doctors appointments? family or friend will provide     Are you on dialysis? No     Do you take coumadin? No     Discharge Plan A Home Health     Discharge Plan B Assisted Living     DME Needed Upon Discharge  none     Discharge Plan  discussed with: POA     Transition of Care Barriers None

## 2024-06-19 NOTE — ASSESSMENT & PLAN NOTE
Empiric antibiotics  Follow up chest x-ray in a.m.  Wean O2 as tolerated  Nebs PRN  Check procal

## 2024-06-19 NOTE — ASSESSMENT & PLAN NOTE
Source unclear.  Chest x-ray equivocal for pneumonia.  Evaluation of pacemaker site appears to be clean.    IV fluid bolus administered   Continue IV fluid   Continue vancomycin and Zosyn   Follow cultures  Follow lab

## 2024-06-19 NOTE — PROGRESS NOTES
Reached out to [employer] rep regarding pacemaker. Conducted pacer interrogation at bedside to assess function of device. Report being uploaded to chart. Report showed Normal Function of device. No changes at this time.   No further intervention indicated at this time

## 2024-06-19 NOTE — PROGRESS NOTES
Pharmacokinetic Initial Assessment: IV Vancomycin    Assessment/Plan:    Initiate intravenous vancomycin with loading dose of 1750 mg once followed by a maintenance dose of vancomycin 1750mg IV every 24 hours  Desired empiric serum trough concentration is 15 to 20 mcg/mL  Draw vancomycin trough level 60 min prior to third dose on 6-20 at approximately 2000  Pharmacy will continue to follow and monitor vancomycin.      Please contact pharmacy at extension [phone] with any questions regarding this assessment.     Thank you for the consult,   Ian Woody

## 2024-06-19 NOTE — H&P
[facility] - Emergency Dept  Hospital Medicine  History & Physical    Patient Name: Elpidio Ayala  MRN: [id number]  Patient Class: IP- Inpatient  Admission Date: 6/18/2024  Attending Physician: Lidia Grover MD  Primary Care Provider: [facility] Primary [facility]         Patient information was obtained from patient and ER records.     Subjective:     Principal Problem:<principal problem not specified>    Chief Complaint:   Chief Complaint   Patient presents with    Altered Mental Status     Pt had a recent pacemaker placed and was sent back to nursing home today. Pt was up eating and started to vomit then went unresponsive. Upon arrival pt AMS with coffee ground like emesis around his mouth. Per ems pt room air sats 90%. Pt only answered birth month    Vomiting        HPI: History is taken from medical records and medical personnel because the patient is confused.  This is an 89-year-old male who recently had a pacemaker placement.  During the hospital stay the patient was confused and was managed with quetiapine and Haldol.  Patient also had pacemaker reassess and appear to be in the right position.  He was discharged to a skilled nursing facility but they noted that the patient was more somnolent and more confused.  Patient was sent back to the hospital where it is noted the patient is running a fever.  Patient also had low blood pressure.  Patient was diagnosed with sepsis and antibiotics with fluid was provided.  Hospitalist will readmit this patient and continue antibiotics.    Past Medical History:   Diagnosis Date    BPH (benign prostatic hypertrophy)     Bradycardia     Dementia     Mobitz (type) I (Wenckebach's) atrioventricular block     Poor historian     Scrotal mass     Wears glasses        Past Surgical History:   Procedure Laterality Date    A-V CARDIAC PACEMAKER INSERTION N/A 6/12/2024    Procedure: INSERTION, CARDIAC PACEMAKER, DUAL CHAMBER;  Surgeon: Arie Jimenez MD;  Location:  [facility] CATH/EP LAB;  Service: Cardiology;  Laterality: N/A;    EYE SURGERY  BILAT CATARACT WITH LENS    PROSTATE SURGERY  2009    TONSILLECTOMY         Review of patient's allergies indicates:   Allergen Reactions    No known drug allergies        Current Facility-Administered Medications on File Prior to Encounter   Medication    [DISCONTINUED] acetaminophen tablet 650 mg    [DISCONTINUED] haloperidol lactate injection 5 mg    [DISCONTINUED] heparin (porcine) injection 5,000 Units    [DISCONTINUED] metoprolol tartrate (LOPRESSOR) split tablet 12.5 mg    [DISCONTINUED] ondansetron injection 4 mg    [DISCONTINUED] QUEtiapine tablet 50 mg     Current Outpatient Medications on File Prior to Encounter   Medication Sig    cyanocobalamin 500 MCG tablet Take 500 mcg by mouth once daily.    metoprolol tartrate (LOPRESSOR) 25 MG tablet Take 0.5 tablets (12.5 mg total) by mouth 2 (two) times daily. (Patient taking differently: Take 12.5 mg by mouth 2 (two) times daily. NEW Rx - NOT YET STARTED)    QUEtiapine (SEROQUEL) 50 MG tablet Take 1 tablet (50 mg total) by mouth after dinner. (Patient taking differently: Take 50 mg by mouth after dinner. NEW Rx - NOT YET STARTED)     Family History    None       Tobacco Use    Smoking status: Never    Smokeless tobacco: Never   Substance and Sexual Activity    Alcohol use: No    Drug use: No    Sexual activity: Not on file     Review of Systems   Reason unable to perform ROS: Unable to evaluate because the patient is confused.     Objective:     Vital Signs (Most Recent):  Temp: (!) 101.9 °F (38.8 °C) (06/18/24 1942)  Pulse: 79 (06/18/24 2130)  Resp: 18 (06/18/24 2130)  BP: (!) 102/57 (06/18/24 2130)  SpO2: (!) 94 % (06/18/24 2130) Vital Signs (24h Range):  Temp:  [97.3 °F (36.3 °C)-101.9 °F (38.8 °C)] 101.9 °F (38.8 °C)  Pulse:  [] 79  Resp:  [17-24] 18  SpO2:  [90 %-96 %] 94 %  BP: ()/(52-84) 102/57     Weight: 84.4 kg (186 lb)  Body mass index is 25.23 kg/m².     Physical  Exam  Constitutional:       Appearance: Normal appearance.   HENT:      Head: Normocephalic and atraumatic.      Nose: Nose normal.   Eyes:      Pupils: Pupils are equal, round, and reactive to light.   Cardiovascular:      Rate and Rhythm: Normal rate and regular rhythm.   Pulmonary:      Effort: Pulmonary effort is normal.   Abdominal:      Palpations: Abdomen is soft.   Musculoskeletal:      Cervical back: Neck supple.   Skin:     General: Skin is warm.   Neurological:      Mental Status: He is alert. He is disoriented.              CRANIAL NERVES     CN III, IV, VI   Pupils are equal, round, and reactive to light.       Significant Labs: All pertinent labs within the past 24 hours have been reviewed.  Recent Lab Results  (Last 5 results in the past 24 hours)        06/18/24 2019 06/18/24 2010 06/18/24  1933   06/18/24  1932 06/18/24  1923        Procalcitonin   0.085  Comment: The Procalcitonin test is intended to aid in the risk assessment of   critically ill patients on their first day of ICU admission for   progression   to severe sepsis and septic shock.    A result of <0.50 ng/mL is associated with a low risk of severe   sepsis   and/or septic shock.  This does not exclude localized infection.    A result between 0.50 ng/mL and 2.0 ng/mL should be interpreted with   consideration of the patient's history and is recommended to retest   within 6   to 24 hours.        A result of >2.0 ng/mL is associated with a high risk of severe   sepsis   and/or septic shock.    Procalcitonin may not be accurate among patients with   localized  infection, recent trauma or major surgery, immunosuppressed   state,  invasive fungal infection, renal dysfunction. Decisions   regarding  initiation or continuation of antibiotic therapy should not be   based  solely on procalcitonin levels.               Albumin 2.8               ALP 51               ALT 12               Anion Gap 7               AST 20                BILIRUBIN TOTAL 1.0  Comment: For infants and newborns, interpretation of results should be based  on gestational age, weight and in agreement with clinical  observations.    Premature Infant recommended reference ranges:  Up to 24 hours.............<8.0 mg/dL  Up to 48 hours............<12.0 mg/dL  3-5 days..................<15.0 mg/dL  6-29 days.................<15.0 mg/dL                 BUN 30               Calcium 7.5               Chloride 105               CO2 20               Creatinine 1.1               eGFR >60.0               Glucose 102               Lipase 65               Magnesium  1.6               Occult Blood       Negative         POC Creatinine         1.3       POC Lactate     2.05           Potassium 4.4               PROTEIN TOTAL 4.7               Sample     VENOUS     VENOUS       Sodium 132               Troponin I High Sensitivity 18.1  Comment: Troponin results differ between methods. Do not use   results between Troponin methods interchangeably.    Alkaline Phospatase levels above 400 U/L may   cause false positive results.    Access hsTnI should not be used for patients taking   Asfotase airam (Strensiq).                                        Significant Imaging: I have reviewed all pertinent imaging results/findings within the past 24 hours.  Assessment/Plan:     Dementia with behavioral disturbance  Supportive care   Haldol p.r.n.  Hold quetiapine for now    Symptomatic bradycardia  Pacemaker in place  Metoprolol 12.5 mg b.i.d.      Pneumonia  Unclear if this is pneumonia   Empiric antibiotics  Follow up chest x-ray in a.m.    Sepsis  Source unclear.  Chest x-ray equivocal for pneumonia.  Evaluation of pacemaker site appears to be clean.    IV fluid bolus administered   Continue IV fluid   Continue vancomycin and Zosyn   Follow cultures  Follow lab    Encephalopathy, metabolic  Acute on chronic dementia.  To suspected infection        VTE Risk Mitigation (From admission, onward)            Ordered     heparin (porcine) injection 5,000 Units  Every 8 hours         06/18/24 2143     IP VTE HIGH RISK PATIENT  Once         06/18/24 2143     Place sequential compression device  Until discontinued         06/18/24 2143                                    Lidia Grover MD  Department of Hospital Medicine  [facility] - Emergency Dept

## 2024-06-19 NOTE — SUBJECTIVE & OBJECTIVE
Interval History: see hospital course     Review of Systems   Constitutional:  Negative for chills and fever.   Respiratory: Negative.     Cardiovascular: Negative.    Gastrointestinal: Negative.    Neurological: Negative.    Psychiatric/Behavioral:  Positive for confusion.      Objective:     Vital Signs (Most Recent):  Temp: 98.1 °F (36.7 °C) (06/19/24 1115)  Pulse: 62 (06/19/24 1200)  Resp: 11 (06/19/24 1200)  BP: (!) 141/68 (06/19/24 1200)  SpO2: 99 % (06/19/24 1200) Vital Signs (24h Range):  Temp:  [98.1 °F (36.7 °C)-101.9 °F (38.8 °C)] 98.1 °F (36.7 °C)  Pulse:  [] 62  Resp:  [11-27] 11  SpO2:  [90 %-100 %] 99 %  BP: ()/(50-78) 141/68     Weight: 84 kg (185 lb 3 oz)  Body mass index is 25.12 kg/m².    Intake/Output Summary (Last 24 hours) at 6/19/2024 1218  Last data filed at 6/19/2024 0846  Gross per 24 hour   Intake 3108.75 ml   Output 250 ml   Net 2858.75 ml         Physical Exam  Constitutional:       General: He is not in acute distress.  HENT:      Head: Normocephalic and atraumatic.      Mouth/Throat:      Mouth: Mucous membranes are dry.      Pharynx: Oropharynx is clear.   Eyes:      Extraocular Movements: Extraocular movements intact.      Conjunctiva/sclera: Conjunctivae normal.   Cardiovascular:      Rate and Rhythm: Normal rate and regular rhythm.      Pulses: Normal pulses.      Heart sounds: Murmur heard.   Pulmonary:      Effort: Pulmonary effort is normal. No respiratory distress.      Breath sounds: No wheezing.   Abdominal:      General: Bowel sounds are normal. There is no distension.      Tenderness: There is no abdominal tenderness.   Musculoskeletal:      Cervical back: Normal range of motion and neck supple. No rigidity.   Skin:     General: Skin is warm and dry.      Capillary Refill: Capillary refill takes less than 2 seconds.      Findings: Bruising present.   Neurological:      General: No focal deficit present.      Mental Status: He is alert. Mental status is at  baseline.             Significant Labs: All pertinent labs within the past 24 hours have been reviewed.  Recent Lab Results  (Last 5 results in the past 24 hours)        06/19/24  0440   06/19/24  0439   06/19/24  0356   06/18/24 2037 06/18/24 2026        Albumin   3.0             ALP   55             ALT   14             Anion Gap   9             AST   25             Baso # 0.02               Basophil % 0.4               BILIRUBIN TOTAL   1.1  Comment: For infants and newborns, interpretation of results should be based  on gestational age, weight and in agreement with clinical  observations.    Premature Infant recommended reference ranges:  Up to 24 hours.............<8.0 mg/dL  Up to 48 hours............<12.0 mg/dL  3-5 days..................<15.0 mg/dL  6-29 days.................<15.0 mg/dL               Blood Culture, Routine       No Growth to date  [P]   No Growth to date  [P]       BUN   26             Calcium   7.9             Chloride   106             CO2   19             Creatinine   0.9             Differential Method Automated               eGFR   >60.0             Eos # 0.0               Eos % 0.2               Glucose   85             Gran # (ANC) 2.8               Gran % 63.2               Hematocrit 40.8               Hemoglobin 13.3               Immature Grans (Abs) 0.05  Comment: Mild elevation in immature granulocytes is non specific and   can be seen in a variety of conditions including stress response,   acute inflammation, trauma and pregnancy. Correlation with other   laboratory and clinical findings is essential.                 Immature Granulocytes 1.1               Lactic Acid Level     0.9  Comment: Falsely low lactic acid results can be found in samples   containing >=13.0 mg/dL total bilirubin and/or >=3.5 mg/dL   direct bilirubin.             Lymph # 1.0               Lymph % 21.1               Magnesium    1.8             MCH 33.6               MCHC 32.6                               Mono # 0.6               Mono % 14.0               MPV 11.0               nRBC 0               Platelet Count 98               Potassium   4.0             PROTEIN TOTAL   5.1             RBC 3.96               RDW 14.6               Sodium   134             WBC 4.50                                       [P] - Preliminary Result               Significant Imaging: I have reviewed all pertinent imaging results/findings within the past 24 hours.

## 2024-06-20 LAB
ALBUMIN SERPL BCP-MCNC: 3.2 G/DL (ref 3.5–5.2)
ALP SERPL-CCNC: 55 U/L (ref 55–135)
ALT SERPL W/O P-5'-P-CCNC: 16 U/L (ref 10–44)
ANION GAP SERPL CALC-SCNC: 8 MMOL/L (ref 8–16)
AST SERPL-CCNC: 29 U/L (ref 10–40)
BASOPHILS # BLD AUTO: 0.01 K/UL (ref 0–0.2)
BASOPHILS NFR BLD: 0.3 % (ref 0–1.9)
BILIRUB SERPL-MCNC: 1 MG/DL (ref 0.1–1)
BUN SERPL-MCNC: 17 MG/DL (ref 8–23)
CALCIUM SERPL-MCNC: 8.3 MG/DL (ref 8.7–10.5)
CHLORIDE SERPL-SCNC: 108 MMOL/L (ref 95–110)
CO2 SERPL-SCNC: 21 MMOL/L (ref 23–29)
CREAT SERPL-MCNC: 0.9 MG/DL (ref 0.5–1.4)
DIFFERENTIAL METHOD BLD: ABNORMAL
EOSINOPHIL # BLD AUTO: 0 K/UL (ref 0–0.5)
EOSINOPHIL NFR BLD: 1.1 % (ref 0–8)
ERYTHROCYTE [DISTWIDTH] IN BLOOD BY AUTOMATED COUNT: 14.5 % (ref 11.5–14.5)
EST. GFR  (NO RACE VARIABLE): >60 ML/MIN/1.73 M^2
GLUCOSE SERPL-MCNC: 79 MG/DL (ref 70–110)
HCT VFR BLD AUTO: 42.8 % (ref 40–54)
HGB BLD-MCNC: 14.4 G/DL (ref 14–18)
IMM GRANULOCYTES # BLD AUTO: 0.02 K/UL (ref 0–0.04)
IMM GRANULOCYTES NFR BLD AUTO: 0.5 % (ref 0–0.5)
LYMPHOCYTES # BLD AUTO: 1.3 K/UL (ref 1–4.8)
LYMPHOCYTES NFR BLD: 35 % (ref 18–48)
MAGNESIUM SERPL-MCNC: 1.9 MG/DL (ref 1.6–2.6)
MCH RBC QN AUTO: 33.6 PG (ref 27–31)
MCHC RBC AUTO-ENTMCNC: 33.6 G/DL (ref 32–36)
MCV RBC AUTO: 100 FL (ref 82–98)
MONOCYTES # BLD AUTO: 0.5 K/UL (ref 0.3–1)
MONOCYTES NFR BLD: 13.3 % (ref 4–15)
NEUTROPHILS # BLD AUTO: 1.9 K/UL (ref 1.8–7.7)
NEUTROPHILS NFR BLD: 49.8 % (ref 38–73)
NRBC BLD-RTO: 0 /100 WBC
PLATELET # BLD AUTO: 113 K/UL (ref 150–450)
PMV BLD AUTO: 11 FL (ref 9.2–12.9)
POTASSIUM SERPL-SCNC: 4.3 MMOL/L (ref 3.5–5.1)
PROT SERPL-MCNC: 5.4 G/DL (ref 6–8.4)
RBC # BLD AUTO: 4.28 M/UL (ref 4.6–6.2)
SODIUM SERPL-SCNC: 137 MMOL/L (ref 136–145)
WBC # BLD AUTO: 3.77 K/UL (ref 3.9–12.7)

## 2024-06-20 PROCEDURE — 94761 N-INVAS EAR/PLS OXIMETRY MLT: CPT

## 2024-06-20 PROCEDURE — 97163 PT EVAL HIGH COMPLEX 45 MIN: CPT

## 2024-06-20 PROCEDURE — 63600175 PHARM REV CODE 636 W HCPCS: Performed by: HOSPITALIST

## 2024-06-20 PROCEDURE — 97535 SELF CARE MNGMENT TRAINING: CPT

## 2024-06-20 PROCEDURE — 80053 COMPREHEN METABOLIC PANEL: CPT | Performed by: HOSPITALIST

## 2024-06-20 PROCEDURE — 20000000 HC ICU ROOM

## 2024-06-20 PROCEDURE — 83735 ASSAY OF MAGNESIUM: CPT | Performed by: HOSPITALIST

## 2024-06-20 PROCEDURE — 97166 OT EVAL MOD COMPLEX 45 MIN: CPT

## 2024-06-20 PROCEDURE — 97530 THERAPEUTIC ACTIVITIES: CPT

## 2024-06-20 PROCEDURE — 36415 COLL VENOUS BLD VENIPUNCTURE: CPT | Performed by: HOSPITALIST

## 2024-06-20 PROCEDURE — 25000003 PHARM REV CODE 250: Performed by: HOSPITALIST

## 2024-06-20 PROCEDURE — 21400001 HC TELEMETRY ROOM

## 2024-06-20 PROCEDURE — 21000000 HC CCU ICU ROOM CHARGE

## 2024-06-20 PROCEDURE — 85025 COMPLETE CBC W/AUTO DIFF WBC: CPT | Performed by: HOSPITALIST

## 2024-06-20 PROCEDURE — 97116 GAIT TRAINING THERAPY: CPT

## 2024-06-20 RX ADMIN — HALOPERIDOL LACTATE 2 MG: 5 INJECTION, SOLUTION INTRAMUSCULAR at 01:06

## 2024-06-20 RX ADMIN — PIPERACILLIN SODIUM AND TAZOBACTAM SODIUM 3.38 G: 3; .375 INJECTION, POWDER, LYOPHILIZED, FOR SOLUTION INTRAVENOUS at 05:06

## 2024-06-20 RX ADMIN — METOPROLOL TARTRATE 12.5 MG: 25 TABLET, FILM COATED ORAL at 08:06

## 2024-06-20 RX ADMIN — MUPIROCIN 1 G: 20 OINTMENT TOPICAL at 08:06

## 2024-06-20 RX ADMIN — PIPERACILLIN SODIUM AND TAZOBACTAM SODIUM 3.38 G: 3; .375 INJECTION, POWDER, LYOPHILIZED, FOR SOLUTION INTRAVENOUS at 12:06

## 2024-06-20 RX ADMIN — PIPERACILLIN SODIUM AND TAZOBACTAM SODIUM 3.38 G: 3; .375 INJECTION, POWDER, LYOPHILIZED, FOR SOLUTION INTRAVENOUS at 08:06

## 2024-06-20 RX ADMIN — ENOXAPARIN SODIUM 40 MG: 40 INJECTION SUBCUTANEOUS at 06:06

## 2024-06-20 RX ADMIN — QUETIAPINE 50 MG: 25 TABLET ORAL at 05:06

## 2024-06-20 NOTE — ASSESSMENT & PLAN NOTE
Empiric antibiotics  Follow up chest x-ray in a.m.  Wean O2 as tolerated  Nebs PRN  procal - normal

## 2024-06-20 NOTE — PLAN OF CARE
Problem: Adult Inpatient Plan of Care  Goal: Plan of Care Review  Outcome: Progressing  Goal: Patient-Specific Goal (Individualized)  Outcome: Progressing  Goal: Absence of Hospital-Acquired Illness or Injury  Outcome: Progressing  Goal: Optimal Comfort and Wellbeing  Outcome: Progressing     Problem: Sepsis/Septic Shock  Goal: Optimal Coping  Outcome: Progressing  Goal: Absence of Bleeding  Outcome: Progressing  Goal: Blood Glucose Level Within Targeted Range  Outcome: Progressing  Goal: Absence of Infection Signs and Symptoms  Outcome: Progressing  Goal: Optimal Nutrition Intake  Outcome: Progressing     Problem: Pneumonia  Goal: Fluid Balance  Outcome: Progressing  Goal: Resolution of Infection Signs and Symptoms  Outcome: Progressing  Goal: Effective Oxygenation and Ventilation  Outcome: Progressing     Problem: Wound  Goal: Optimal Coping  Outcome: Progressing  Goal: Optimal Functional Ability  Outcome: Progressing  Goal: Absence of Infection Signs and Symptoms  Outcome: Progressing  Goal: Improved Oral Intake  Outcome: Progressing  Goal: Optimal Pain Control and Function  Outcome: Progressing  Goal: Skin Health and Integrity  Outcome: Progressing  Goal: Optimal Wound Healing  Outcome: Progressing     Problem: Fall Injury Risk  Goal: Absence of Fall and Fall-Related Injury  Outcome: Progressing     Problem: Skin Injury Risk Increased  Goal: Skin Health and Integrity  Outcome: Progressing     Problem: Restraint, Nonviolent  Goal: Absence of Harm or Injury  Outcome: Progressing

## 2024-06-20 NOTE — PT/OT/SLP EVAL
Occupational Therapy   Evaluation    Name: Elpidio Ayala  MRN: [id number]  Admitting Diagnosis: <principal problem not specified>  Recent Surgery: * No surgery found *      Recommendations:     Discharge Recommendations: Moderate Intensity Therapy  Discharge Equipment Recommendations:  to be determined by next level of care  Barriers to discharge:  Other (Comment) (increased assist with ADLs)    Assessment:     Elpidio Ayala is a 89 y.o. male with a medical diagnosis of <principal problem not specified>. Pt was agreeable to OT this AM. Performance deficits affecting function: weakness, impaired endurance, impaired self care skills, impaired functional mobility, gait instability, impaired balance, impaired cognition, decreased coordination, decreased lower extremity function, decreased safety awareness, impaired cardiopulmonary response to activity.      Rehab Prognosis: Fair; patient would benefit from acute skilled OT services to address these deficits and reach maximum level of function.       Plan:     Patient to be seen 5 x/week to address the above listed problems via self-care/home management, therapeutic activities, therapeutic exercises  Plan of Care Expires: 07/20/24  Plan of Care Reviewed with: patient    Subjective     Chief Complaint: none stated  Patient/Family Comments/goals: none stated    Occupational Profile:  Living Environment: Pt is a resident at at [facility]   Previous level of function: Pt uses a w/c for mobility and requires assist with ADLs  Roles and Routines: facility resident, participatory in self care  Equipment Used at Home: wheelchair, walker, rolling, cane, straight  Assistance upon Discharge: from facility     Pain/Comfort:  Pain Rating 1: 0/10      Objective:     Communicated with: nurse prior to session.  Patient found sitting edge of bed with telemetry, simpson catheter, restraints upon OT entry to room.    General Precautions: Standard,  fall  Orthopedic Precautions: N/A  Braces: N/A  Respiratory Status: Room air    Occupational Performance:    Bed Mobility:    Patient completed Sit to Supine with moderate assistance    Activities of Daily Living:  Grooming: set up assist to wash face while seated EOB  Toileting: simpson      Cognitive/Visual Perceptual:  Cognitive/Psychosocial Skills:     -       Follows Commands/attention:Follows two-step commands  -       Communication: clear/fluent  -       Memory: Impaired LTM  -       Safety awareness/insight to disability: impaired   -       Mood/Affect/Coping skills/emotional control: Appropriate to situation, Cooperative, and Pleasant    Physical Exam:  Balance:    -       Sitting balance SBA; standing balance to be assessed next service date  Upper Extremity Range of Motion:     -       Right Upper Extremity: WFL  -       Left Upper Extremity: WFL  Upper Extremity Strength:    -       Right Upper Extremity: WFL  -       Left Upper Extremity: WFL   Strength:    -       Right Upper Extremity: WFL  -       Left Upper Extremity: WFL  Fine Motor Coordination:    -       Intact  Gross motor coordination:   WFL    AMPAC 6 Click ADL:  AMPAC Total Score: 16    Treatment & Education:  Pt educated on role of OT/POC, importance of OOB/EOB activity, use of call bell, and safety during ADLs, transfers, and functional mobility.       Patient left supine with all lines intact, call button in reach, and bed alarm on    GOALS:   Multidisciplinary Problems       Occupational Therapy Goals          Problem: Occupational Therapy    Goal Priority Disciplines Outcome Interventions   Occupational Therapy Goal     OT, PT/OT     Description: Goals to be met by: 7/20/24     Patient will increase functional independence with ADLs by performing:    UE Dressing with Supervision Assistance.  LE Dressing with Minimal Assistance and with AD as needed.  Grooming while standing with Supervision.  Toileting from toilet with Supervision for  hygiene and clothing management.   Toilet transfer to toilet with Supervision with AD as needed.                         History:     Past Medical History:   Diagnosis Date    BPH (benign prostatic hypertrophy)     Bradycardia     Dementia     Mobitz (type) I (Wenckebach's) atrioventricular block     Poor historian     Scrotal mass     Wears glasses          Past Surgical History:   Procedure Laterality Date    A-V CARDIAC PACEMAKER INSERTION N/A 6/12/2024    Procedure: INSERTION, CARDIAC PACEMAKER, DUAL CHAMBER;  Surgeon: Arie Jimenez MD;  Location: [facility] CATH/EP LAB;  Service: Cardiology;  Laterality: N/A;    EYE SURGERY  BILAT CATARACT WITH LENS    PROSTATE SURGERY  2009    TONSILLECTOMY         Time Tracking:     OT Date of Treatment: 06/20/24  OT Start Time: 1051  OT Stop Time: 1104  OT Total Time (min): 13 min    Billable Minutes:Evaluation 5  Self Care/Home Management 8    6/20/2024

## 2024-06-20 NOTE — PLAN OF CARE
Spoke with pt's brother, Celestine, regarding DC planning. We discussed therapy recommendations and SNF. Celestine would like to avoid moving pt around again due to him just moving into the assisted living and does not adjust to different situations quickly. Would like pt to return to assisted living at DC if possible. We did discuss the possibility of assisted living not letting home back with current level.     CM called pt's assisted living and spoke with Jen. I explained the above. Per Jen, pt can return as long as he does not require more than 2 person assist. States pt can return tomorrow or Monday but cannot return over weekend.          06/20/24 1537   Post-Acute Status   Post-Acute Authorization Placement   Post-Acute Placement Status Patient declined/refused

## 2024-06-20 NOTE — PLAN OF CARE
Problem: Physical Therapy  Goal: Physical Therapy Goal  Description: 1. Supine to sit with Stand-by Assistance  2. Sit to stand transfer with Stand-by Assistance  3.. Bed to chair transfer with Supervision using Rolling Walker  4. Gait  x 100 feet with Minimal Assistance using Rolling Walker.     Outcome: Progressing

## 2024-06-20 NOTE — PT/OT/SLP EVAL
Physical Therapy Evaluation    Patient Name:  Elpidio Ayala   MRN:  [id number]    Recommendations:     Discharge Recommendations: Moderate Intensity Therapy   Discharge Equipment Recommendations: to be determined by next level of care   Barriers to discharge: Decreased caregiver support and medical status, increased assist with all mobilty, cognitie status    Assessment:     Elpidio Ayala is a 89 y.o. male admitted with a medical diagnosis of <principal problem not specified>.  He presents with the following impairments/functional limitations: weakness, impaired endurance, impaired self care skills, impaired functional mobility, gait instability, impaired cognition, decreased coordination, impaired cardiopulmonary response to activity Pt found with HOB elevated and awake visiting with friend from [facility].  Pt is alert and agreeable to PT evaluation.  He is oriented to self, city. Pt requires modA for bed mobility, maxA x2 for sit to stand and steps continue to progress as able.    Rehab Prognosis: Fair; patient would benefit from acute skilled PT services to address these deficits and reach maximum level of function.    Recent Surgery: * No surgery found *      Plan:     During this hospitalization, patient to be seen 6 x/week to address the identified rehab impairments via gait training, therapeutic activities, therapeutic exercises, neuromuscular re-education and progress toward the following goals:    Plan of Care Expires:       Subjective     Chief Complaint: weakness  Patient/Family Comments/goals: get better  Pain/Comfort:  Pain Rating 1: 0/10    Patients cultural, spiritual, Congregational conflicts given the current situation:      Living Environment:  Pt just moved to assisted living facility per chart review.  Prior to admission, patients level of function was unable to determine.  Equipment used at home: wheelchair, walker, rolling, cane, straight.  DME owned (not currently used): none.  Upon discharge,  patient will have assistance from facility staff.    Objective:     Communicated with RN prior to session.  Patient found HOB elevated with telemetry, simpson catheter, peripheral IV, blood pressure cuff, pulse ox (continuous), bed alarm  upon PT entry to room.    General Precautions: Standard, fall  Orthopedic Precautions:    Braces:    Respiratory Status: Room air    Exams:  Cognitive Exam:  Patient is oriented to Person and Situation  RLE Strength: 3+/5  LLE Strength: 3+/5    Functional Mobility:  Bed Mobility:     Supine to Sit: moderate assistance  Sit to Supine: maximal assistance  Transfers:     Sit to Stand:  maximal assistance and of 2 persons with rolling walker  Gait: x5 steps fw/back/sidestep with maxA and RW      AM-PAC 6 CLICK MOBILITY  Total Score:11       Treatment & Education:  Pt was educated on the following: call light use, importance of OOB activity and functional mobility to negate the negative effects of prolonged bed rest during this hospitalization, safe transfers/ambulation and discharge planning recommendations/options.     Patient left HOB elevated with all lines intact, call button in reach, bed alarm on, and RN notified.    GOALS:   Multidisciplinary Problems       Physical Therapy Goals          Problem: Physical Therapy    Goal Priority Disciplines Outcome Goal Variances Interventions   Physical Therapy Goal     PT, PT/OT Progressing     Description: 1. Supine to sit with Stand-by Assistance  2. Sit to stand transfer with Stand-by Assistance  3.. Bed to chair transfer with Supervision using Rolling Walker  4. Gait  x 100 feet with Minimal Assistance using Rolling Walker.                          History:     Past Medical History:   Diagnosis Date    BPH (benign prostatic hypertrophy)     Bradycardia     Dementia     Mobitz (type) I (Wenckebach's) atrioventricular block     Poor historian     Scrotal mass     Wears glasses        Past Surgical History:   Procedure Laterality Date    A-V  CARDIAC PACEMAKER INSERTION N/A 6/12/2024    Procedure: INSERTION, CARDIAC PACEMAKER, DUAL CHAMBER;  Surgeon: Arie Jimenez MD;  Location: [facility] CATH/EP LAB;  Service: Cardiology;  Laterality: N/A;    EYE SURGERY  BILAT CATARACT WITH LENS    PROSTATE SURGERY  2009    TONSILLECTOMY         Time Tracking:     PT Received On: 06/20/24  PT Start Time: 1028     PT Stop Time: 1058  PT Total Time (min): 30 min     Billable Minutes: Evaluation 6, Gait Training 12, and Therapeutic Activity 12        06/20/2024

## 2024-06-20 NOTE — PROGRESS NOTES
[facility] Medicine  Progress Note    Patient Name: Elpidio Ayala  MRN: [id number]  Patient Class: IP- Inpatient   Admission Date: 6/18/2024  Length of Stay: 2 days  Attending Physician: Myra Lawrence MD  Primary Care Provider: Laurel, Primary Doctor        Subjective:     Principal Problem:Encephalopathy, metabolic        HPI:  History is taken from medical records and medical personnel because the patient is confused.  This is an 89-year-old male who recently had a pacemaker placement.  During the hospital stay the patient was confused and was managed with quetiapine and Haldol.  Patient also had pacemaker reassess and appear to be in the right position.  He was discharged to a skilled nursing facility but they noted that the patient was more somnolent and more confused.  Patient was sent back to the hospital where it is noted the patient is running a fever.  Patient also had low blood pressure.  Patient was diagnosed with sepsis and antibiotics with fluid was provided.  Hospitalist will readmit this patient and continue antibiotics.    Overview/Hospital Course:  Patient was discharged yesterday after PPM placed and upon return to living facility he was noted to have a fever and sent back to hospital for further evaluation. There is question whether he had an episode of emesis upon returning to facility. On work up CXR suggest pneumonia. Cultures NGTD He was monitored in ICU overnight due to severe sepsis and hypotension. BP improved and stable. He is on zosyn. Vanc dc'd. PT/OT/ST consulted. He is a full code. Ok to transfer to telemetry for further management.     Interval History: see hospital course     Review of Systems   Constitutional:  Negative for chills and fever.   Respiratory: Negative.     Cardiovascular: Negative.    Gastrointestinal: Negative.    Neurological: Negative.    Psychiatric/Behavioral:  Positive for confusion.      Objective:     Vital Signs (Most Recent):  Temp:  98 °F (36.7 °C) (06/20/24 1501)  Pulse: 97 (06/20/24 1701)  Resp: (!) 28 (06/20/24 1701)  BP: (!) 165/81 (06/20/24 1701)  SpO2: 100 % (06/20/24 1701) Vital Signs (24h Range):  Temp:  [98 °F (36.7 °C)-99.3 °F (37.4 °C)] 98 °F (36.7 °C)  Pulse:  [] 97  Resp:  [10-28] 28  SpO2:  [81 %-100 %] 100 %  BP: (105-193)/(61-97) 165/81     Weight: 84 kg (185 lb 3 oz)  Body mass index is 25.12 kg/m².    Intake/Output Summary (Last 24 hours) at 6/20/2024 1813  Last data filed at 6/20/2024 1741  Gross per 24 hour   Intake 2021.25 ml   Output 2030 ml   Net -8.75 ml         Physical Exam  Constitutional:       General: He is not in acute distress.  HENT:      Head: Normocephalic and atraumatic.      Mouth/Throat:      Mouth: Mucous membranes are dry.      Pharynx: Oropharynx is clear.   Eyes:      Extraocular Movements: Extraocular movements intact.      Conjunctiva/sclera: Conjunctivae normal.   Cardiovascular:      Rate and Rhythm: Normal rate and regular rhythm.      Pulses: Normal pulses.      Heart sounds: Murmur heard.   Pulmonary:      Effort: Pulmonary effort is normal. No respiratory distress.      Breath sounds: No wheezing.   Abdominal:      General: Bowel sounds are normal. There is no distension.      Tenderness: There is no abdominal tenderness.   Musculoskeletal:      Cervical back: Normal range of motion and neck supple. No rigidity.   Skin:     General: Skin is warm and dry.      Capillary Refill: Capillary refill takes less than 2 seconds.      Findings: Bruising present.   Neurological:      General: No focal deficit present.      Mental Status: He is alert. Mental status is at baseline.             Significant Labs: All pertinent labs within the past 24 hours have been reviewed.  Recent Lab Results         06/20/24  0331        Albumin 3.2       ALP 55       ALT 16       Anion Gap 8       AST 29       Baso # 0.01       Basophil % 0.3       BILIRUBIN TOTAL 1.0  Comment: For infants and newborns,  interpretation of results should be based  on gestational age, weight and in agreement with clinical  observations.    Premature Infant recommended reference ranges:  Up to 24 hours.............<8.0 mg/dL  Up to 48 hours............<12.0 mg/dL  3-5 days..................<15.0 mg/dL  6-29 days.................<15.0 mg/dL         BUN 17       Calcium 8.3       Chloride 108       CO2 21       Creatinine 0.9       Differential Method Automated       eGFR >60.0       Eos # 0.0       Eos % 1.1       Glucose 79       Gran # (ANC) 1.9       Gran % 49.8       Hematocrit 42.8       Hemoglobin 14.4       Immature Grans (Abs) 0.02  Comment: Mild elevation in immature granulocytes is non specific and   can be seen in a variety of conditions including stress response,   acute inflammation, trauma and pregnancy. Correlation with other   laboratory and clinical findings is essential.         Immature Granulocytes 0.5       Lymph # 1.3       Lymph % 35.0       Magnesium  1.9       MCH 33.6       MCHC 33.6              Mono # 0.5       Mono % 13.3       MPV 11.0       nRBC 0       Platelet Count 113       Potassium 4.3       PROTEIN TOTAL 5.4       RBC 4.28       RDW 14.5       Sodium 137       WBC 3.77               Significant Imaging: I have reviewed all pertinent imaging results/findings within the past 24 hours.    Assessment/Plan:      * Encephalopathy, metabolic  Acute on chronic dementia.  To suspected infection      Dementia with behavioral disturbance  Supportive care   Haldol p.r.n.  Resume seroquel     Symptomatic bradycardia  Pacemaker in place  Metoprolol 12.5 mg b.i.d.      Pneumonia    Empiric antibiotics  Follow up chest x-ray in a.m.  Wean O2 as tolerated  Nebs PRN  procal - normal     Sepsis  Source unclear.  Chest x-ray equivocal for pneumonia.  Evaluation of pacemaker site appears to be clean.    IV fluid bolus administered   Continue IV fluid while NPO - then dc once tolerating a diet   Continue  vancomycin and Zosyn   Follow cultures  Procal pending      VTE Risk Mitigation (From admission, onward)           Ordered     enoxaparin injection 40 mg  Every 24 hours         06/19/24 1603     IP VTE HIGH RISK PATIENT  Once         06/18/24 2143     Place sequential compression device  Until discontinued         06/18/24 2143                    Discharge Planning   MARINA: 6/22/2024     Code Status: Full Code   Is the patient medically ready for discharge?:     Reason for patient still in hospital (select all that apply): Patient trending condition  Discharge Plan A: Home Health            Critical care time spent on the evaluation and treatment of severe organ dysfunction, review of pertinent labs and imaging studies, discussions with consulting providers and discussions with patient/family: 30 minutes.      Myra Lawrence MD  Department of Hospital Medicine   [facility]

## 2024-06-20 NOTE — SUBJECTIVE & OBJECTIVE
Interval History: see hospital course     Review of Systems   Constitutional:  Negative for chills and fever.   Respiratory: Negative.     Cardiovascular: Negative.    Gastrointestinal: Negative.    Neurological: Negative.    Psychiatric/Behavioral:  Positive for confusion.      Objective:     Vital Signs (Most Recent):  Temp: 98 °F (36.7 °C) (06/20/24 1501)  Pulse: 97 (06/20/24 1701)  Resp: (!) 28 (06/20/24 1701)  BP: (!) 165/81 (06/20/24 1701)  SpO2: 100 % (06/20/24 1701) Vital Signs (24h Range):  Temp:  [98 °F (36.7 °C)-99.3 °F (37.4 °C)] 98 °F (36.7 °C)  Pulse:  [] 97  Resp:  [10-28] 28  SpO2:  [81 %-100 %] 100 %  BP: (105-193)/(61-97) 165/81     Weight: 84 kg (185 lb 3 oz)  Body mass index is 25.12 kg/m².    Intake/Output Summary (Last 24 hours) at 6/20/2024 1813  Last data filed at 6/20/2024 1741  Gross per 24 hour   Intake 2021.25 ml   Output 2030 ml   Net -8.75 ml         Physical Exam  Constitutional:       General: He is not in acute distress.  HENT:      Head: Normocephalic and atraumatic.      Mouth/Throat:      Mouth: Mucous membranes are dry.      Pharynx: Oropharynx is clear.   Eyes:      Extraocular Movements: Extraocular movements intact.      Conjunctiva/sclera: Conjunctivae normal.   Cardiovascular:      Rate and Rhythm: Normal rate and regular rhythm.      Pulses: Normal pulses.      Heart sounds: Murmur heard.   Pulmonary:      Effort: Pulmonary effort is normal. No respiratory distress.      Breath sounds: No wheezing.   Abdominal:      General: Bowel sounds are normal. There is no distension.      Tenderness: There is no abdominal tenderness.   Musculoskeletal:      Cervical back: Normal range of motion and neck supple. No rigidity.   Skin:     General: Skin is warm and dry.      Capillary Refill: Capillary refill takes less than 2 seconds.      Findings: Bruising present.   Neurological:      General: No focal deficit present.      Mental Status: He is alert. Mental status is at  baseline.             Significant Labs: All pertinent labs within the past 24 hours have been reviewed.  Recent Lab Results         06/20/24  0331        Albumin 3.2       ALP 55       ALT 16       Anion Gap 8       AST 29       Baso # 0.01       Basophil % 0.3       BILIRUBIN TOTAL 1.0  Comment: For infants and newborns, interpretation of results should be based  on gestational age, weight and in agreement with clinical  observations.    Premature Infant recommended reference ranges:  Up to 24 hours.............<8.0 mg/dL  Up to 48 hours............<12.0 mg/dL  3-5 days..................<15.0 mg/dL  6-29 days.................<15.0 mg/dL         BUN 17       Calcium 8.3       Chloride 108       CO2 21       Creatinine 0.9       Differential Method Automated       eGFR >60.0       Eos # 0.0       Eos % 1.1       Glucose 79       Gran # (ANC) 1.9       Gran % 49.8       Hematocrit 42.8       Hemoglobin 14.4       Immature Grans (Abs) 0.02  Comment: Mild elevation in immature granulocytes is non specific and   can be seen in a variety of conditions including stress response,   acute inflammation, trauma and pregnancy. Correlation with other   laboratory and clinical findings is essential.         Immature Granulocytes 0.5       Lymph # 1.3       Lymph % 35.0       Magnesium  1.9       MCH 33.6       MCHC 33.6              Mono # 0.5       Mono % 13.3       MPV 11.0       nRBC 0       Platelet Count 113       Potassium 4.3       PROTEIN TOTAL 5.4       RBC 4.28       RDW 14.5       Sodium 137       WBC 3.77               Significant Imaging: I have reviewed all pertinent imaging results/findings within the past 24 hours.

## 2024-06-21 VITALS
DIASTOLIC BLOOD PRESSURE: 56 MMHG | HEIGHT: 72 IN | TEMPERATURE: 98 F | OXYGEN SATURATION: 100 % | WEIGHT: 185.19 LBS | RESPIRATION RATE: 19 BRPM | HEART RATE: 78 BPM | BODY MASS INDEX: 25.08 KG/M2 | SYSTOLIC BLOOD PRESSURE: 109 MMHG

## 2024-06-21 PROBLEM — A41.9 SEPSIS: Status: RESOLVED | Noted: 2024-05-18 | Resolved: 2024-06-21

## 2024-06-21 PROBLEM — R00.1 SYMPTOMATIC BRADYCARDIA: Status: RESOLVED | Noted: 2024-05-18 | Resolved: 2024-06-21

## 2024-06-21 LAB
ALBUMIN SERPL BCP-MCNC: 3.4 G/DL (ref 3.5–5.2)
ALP SERPL-CCNC: 63 U/L (ref 55–135)
ALT SERPL W/O P-5'-P-CCNC: 14 U/L (ref 10–44)
ANION GAP SERPL CALC-SCNC: 9 MMOL/L (ref 8–16)
AST SERPL-CCNC: 27 U/L (ref 10–40)
BASOPHILS # BLD AUTO: 0.02 K/UL (ref 0–0.2)
BASOPHILS NFR BLD: 0.7 % (ref 0–1.9)
BILIRUB SERPL-MCNC: 0.9 MG/DL (ref 0.1–1)
BUN SERPL-MCNC: 15 MG/DL (ref 8–23)
CALCIUM SERPL-MCNC: 8.5 MG/DL (ref 8.7–10.5)
CHLORIDE SERPL-SCNC: 105 MMOL/L (ref 95–110)
CO2 SERPL-SCNC: 24 MMOL/L (ref 23–29)
CREAT SERPL-MCNC: 0.9 MG/DL (ref 0.5–1.4)
DIFFERENTIAL METHOD BLD: ABNORMAL
EOSINOPHIL # BLD AUTO: 0 K/UL (ref 0–0.5)
EOSINOPHIL NFR BLD: 1.4 % (ref 0–8)
ERYTHROCYTE [DISTWIDTH] IN BLOOD BY AUTOMATED COUNT: 14.3 % (ref 11.5–14.5)
EST. GFR  (NO RACE VARIABLE): >60 ML/MIN/1.73 M^2
GLUCOSE SERPL-MCNC: 78 MG/DL (ref 70–110)
HCT VFR BLD AUTO: 45 % (ref 40–54)
HGB BLD-MCNC: 14.8 G/DL (ref 14–18)
IMM GRANULOCYTES # BLD AUTO: 0.02 K/UL (ref 0–0.04)
IMM GRANULOCYTES NFR BLD AUTO: 0.7 % (ref 0–0.5)
LYMPHOCYTES # BLD AUTO: 1.2 K/UL (ref 1–4.8)
LYMPHOCYTES NFR BLD: 39.6 % (ref 18–48)
MAGNESIUM SERPL-MCNC: 1.9 MG/DL (ref 1.6–2.6)
MCH RBC QN AUTO: 33.5 PG (ref 27–31)
MCHC RBC AUTO-ENTMCNC: 32.9 G/DL (ref 32–36)
MCV RBC AUTO: 102 FL (ref 82–98)
MONOCYTES # BLD AUTO: 0.3 K/UL (ref 0.3–1)
MONOCYTES NFR BLD: 11.6 % (ref 4–15)
NEUTROPHILS # BLD AUTO: 1.4 K/UL (ref 1.8–7.7)
NEUTROPHILS NFR BLD: 46 % (ref 38–73)
NRBC BLD-RTO: 0 /100 WBC
PLATELET # BLD AUTO: 118 K/UL (ref 150–450)
PMV BLD AUTO: 10.3 FL (ref 9.2–12.9)
POTASSIUM SERPL-SCNC: 4.3 MMOL/L (ref 3.5–5.1)
PROT SERPL-MCNC: 5.6 G/DL (ref 6–8.4)
RBC # BLD AUTO: 4.42 M/UL (ref 4.6–6.2)
SODIUM SERPL-SCNC: 138 MMOL/L (ref 136–145)
WBC # BLD AUTO: 2.93 K/UL (ref 3.9–12.7)

## 2024-06-21 PROCEDURE — 85025 COMPLETE CBC W/AUTO DIFF WBC: CPT | Performed by: HOSPITALIST

## 2024-06-21 PROCEDURE — 83735 ASSAY OF MAGNESIUM: CPT | Performed by: HOSPITALIST

## 2024-06-21 PROCEDURE — 97116 GAIT TRAINING THERAPY: CPT

## 2024-06-21 PROCEDURE — 94761 N-INVAS EAR/PLS OXIMETRY MLT: CPT

## 2024-06-21 PROCEDURE — 80053 COMPREHEN METABOLIC PANEL: CPT | Performed by: HOSPITALIST

## 2024-06-21 PROCEDURE — 36415 COLL VENOUS BLD VENIPUNCTURE: CPT | Performed by: HOSPITALIST

## 2024-06-21 PROCEDURE — 25000003 PHARM REV CODE 250: Performed by: HOSPITALIST

## 2024-06-21 PROCEDURE — 63600175 PHARM REV CODE 636 W HCPCS: Performed by: HOSPITALIST

## 2024-06-21 RX ORDER — AMOXICILLIN AND CLAVULANATE POTASSIUM 875; 125 MG/1; MG/1
1 TABLET, FILM COATED ORAL 2 TIMES DAILY
Qty: 10 TABLET | Refills: 0 | Status: SHIPPED | OUTPATIENT
Start: 2024-06-21 | End: 2024-06-26

## 2024-06-21 RX ADMIN — PIPERACILLIN SODIUM AND TAZOBACTAM SODIUM 3.38 G: 3; .375 INJECTION, POWDER, LYOPHILIZED, FOR SOLUTION INTRAVENOUS at 05:06

## 2024-06-21 RX ADMIN — METOPROLOL TARTRATE 12.5 MG: 25 TABLET, FILM COATED ORAL at 08:06

## 2024-06-21 RX ADMIN — MUPIROCIN 1 G: 20 OINTMENT TOPICAL at 08:06

## 2024-06-21 NOTE — PLAN OF CARE
Pt clear for DC from case management standpoint. Discharging to home with [facility] .     CM spoke with Lalita from [facility]. Verified that pt is clear to return and nurse here can call report to her at [phone]. States they have WC van transportation and can pick him up as long as ready before 3pm. Informed her that pt is ready for  now. States they will get pt set up with PCP that sees pt there- no apt for CM to set up         06/21/24 1051   Final Note   Assessment Type Final Discharge Note   Anticipated Discharge Disposition Home-[facility] Resources/Appts/Education Provided Patient refused appointment set-up   Post-Acute Status   Post-Acute Authorization Placement   Post-Acute Placement Status Set-up Complete/Auth obtained

## 2024-06-21 NOTE — PLAN OF CARE
Problem: Adult Inpatient Plan of Care  Goal: Plan of Care Review  Outcome: Met  Goal: Patient-Specific Goal (Individualized)  Outcome: Met  Goal: Absence of Hospital-Acquired Illness or Injury  Outcome: Met  Goal: Optimal Comfort and Wellbeing  Outcome: Met     Problem: Sepsis/Septic Shock  Goal: Optimal Coping  Outcome: Met  Goal: Absence of Bleeding  Outcome: Met  Goal: Blood Glucose Level Within Targeted Range  Outcome: Met  Goal: Absence of Infection Signs and Symptoms  Outcome: Met  Goal: Optimal Nutrition Intake  Outcome: Met     Problem: Pneumonia  Goal: Fluid Balance  Outcome: Met  Goal: Resolution of Infection Signs and Symptoms  Outcome: Met  Goal: Effective Oxygenation and Ventilation  Outcome: Met     Problem: Wound  Goal: Optimal Coping  Outcome: Met  Goal: Optimal Functional Ability  Outcome: Met  Goal: Absence of Infection Signs and Symptoms  Outcome: Met  Goal: Improved Oral Intake  Outcome: Met  Goal: Optimal Pain Control and Function  Outcome: Met  Goal: Skin Health and Integrity  Outcome: Met  Goal: Optimal Wound Healing  Outcome: Met     Problem: Fall Injury Risk  Goal: Absence of Fall and Fall-Related Injury  Outcome: Met     Problem: Skin Injury Risk Increased  Goal: Skin Health and Integrity  Outcome: Met     Problem: Restraint, Nonviolent  Goal: Absence of Harm or Injury  Outcome: Met

## 2024-06-21 NOTE — NURSING
Pt discharged via wheelchair to facility van. Pt clothing, shoes and discharged paperwork sent with pt.

## 2024-06-21 NOTE — PT/OT/SLP PROGRESS
Occupational Therapy      Patient Name:  Elpidio Ayala   MRN:  [id number]    Patient not seen today secondary to  (pt eating lunch. Will see later in pm if time permits.). Will follow-up 6/21/2024.    6/21/2024

## 2024-06-21 NOTE — ASSESSMENT & PLAN NOTE
Empiric antibiotics  Follow up chest x-ray in a.m.  Wean O2 as tolerated  Nebs PRN  procal - normal   Transition to oral augmentin and dc

## 2024-06-21 NOTE — PLAN OF CARE
[facility]     Left message for Lalita from pt's [facility]- awaiting call back to discuss DC today.         06/21/24 0921   Post-Acute Status   Post-Acute Authorization Home Health   Home Health Status Referrals Sent

## 2024-06-21 NOTE — PT/OT/SLP PROGRESS
Occupational Therapy      Patient Name:  Elpidio Ayala   MRN:  [id number]    Patient not seen today secondary to  (pt discharged home prior to second attempt.). Will follow-up no.    6/21/2024

## 2024-06-21 NOTE — PT/OT/SLP PROGRESS
Physical Therapy Treatment    Patient Name:  Elpidio Ayala   MRN:  [id number]    Recommendations:     Discharge Recommendations: Moderate Intensity Therapy  Discharge Equipment Recommendations: to be determined by next level of care  Barriers to discharge:  weakness, impaired mobility    Assessment:     Elpidio Ayala is a 89 y.o. male admitted with a medical diagnosis of Encephalopathy, metabolic.  He presents with the following impairments/functional limitations: weakness, impaired endurance, impaired self care skills, impaired functional mobility, gait instability, impaired balance, decreased upper extremity function, decreased lower extremity function, decreased safety awareness, impaired cardiopulmonary response to activity. Patient is agreeable to participation with PT treatment. He requires MaxA for supine to sit and MaxA to scoot EOB. He requires MaxA x2 for sit to stand with RW and posterior lean. He took 6 steps forward/backward with RW, ModA for anterior steps, and MaxA for posterior steps due to significant posterior lean. He returned to bed with bed alarm on, all needs met, and brother present.     Rehab Prognosis: Fair; patient would benefit from acute skilled PT services to address these deficits and reach maximum level of function.    Recent Surgery: * No surgery found *      Plan:     During this hospitalization, patient to be seen 6 x/week to address the identified rehab impairments via gait training, therapeutic activities, therapeutic exercises, neuromuscular re-education and progress toward the following goals:    Plan of Care Expires:  07/20/24    Subjective     Chief Complaint: weak   Patient/Family Comments/goals: get stronger   Pain/Comfort:  Pain Rating 1: 0/10      Objective:     Communicated with KAREN Koenig prior to session.  Patient found HOB elevated with bed alarm, blood pressure cuff, peripheral IV, pulse ox (continuous), telemetry upon PT entry to room.     General Precautions:  Standard, fall  Orthopedic Precautions: N/A  Braces: N/A  Respiratory Status: Room air     Functional Mobility:  Bed Mobility:     Supine to Sit: maximal assistance  Transfers:     Sit to Stand:  maximal assistance and of 2 persons with rolling walker  Gait: 6 steps forward/backward with RW, ModA for anterior steps, and MaxA for posterior steps due to significant posterior lean      AM-PAC 6 CLICK MOBILITY          Treatment & Education:  Patient was educated on the importance of OOB activity and functional mobility to negate negative effects of prolonged bed rest during hospitalization, safe transfers and ambulation, and D/C planning     Patient left HOB elevated with all lines intact, call button in reach, bed alarm on, and brother present..    GOALS:   Multidisciplinary Problems       Physical Therapy Goals          Problem: Physical Therapy    Goal Priority Disciplines Outcome Goal Variances Interventions   Physical Therapy Goal     PT, PT/OT Progressing     Description: 1. Supine to sit with Stand-by Assistance  2. Sit to stand transfer with Stand-by Assistance  3.. Bed to chair transfer with Supervision using Rolling Walker  4. Gait  x 100 feet with Minimal Assistance using Rolling Walker.                          Time Tracking:     PT Received On: 06/21/24  PT Start Time: 1033     PT Stop Time: 1050  PT Total Time (min): 17 min     Billable Minutes: Gait Training 17    Treatment Type: Treatment  PT/PTA: PT     Number of PTA visits since last PT visit: 0     06/21/2024

## 2024-06-21 NOTE — PLAN OF CARE
April Alba accepted. [facility] 6/22 06/21/24 1051   Post-Acute Status   Post-Acute Authorization Placement   Post-Acute Placement Status Set-up Complete/Auth obtained

## 2024-06-21 NOTE — DISCHARGE SUMMARY
[facility] Medicine  Discharge Summary      Patient Name: Elpidio Ayala  MRN: [id number]  SAGE: [id number]  Patient Class: IP- Inpatient  Admission Date: 6/18/2024  Hospital Length of Stay: 3 days  Discharge Date and Time: 6/21/2024  2:26 PM  Attending Physician: Laurel att. providers found   Discharging Provider: Myra Lawrence MD  Primary Care Provider: Laurel, Primary Doctor    Primary Care Team: Networked reference to record PCT     HPI:   History is taken from medical records and medical personnel because the patient is confused.  This is an 89-year-old male who recently had a pacemaker placement.  During the hospital stay the patient was confused and was managed with quetiapine and Haldol.  Patient also had pacemaker reassess and appear to be in the right position.  He was discharged to a skilled nursing facility but they noted that the patient was more somnolent and more confused.  Patient was sent back to the hospital where it is noted the patient is running a fever.  Patient also had low blood pressure.  Patient was diagnosed with sepsis and antibiotics with fluid was provided.  Hospitalist will readmit this patient and continue antibiotics.    * No surgery found *      Hospital Course:   Patient was discharged yesterday after PPM placed and upon return to living facility he was noted to have a fever and sent back to hospital for further evaluation. There is question whether he had an episode of emesis upon returning to facility. On work up CXR suggest pneumonia. Cultures NGTD He was monitored in ICU overnight due to severe sepsis and hypotension. BP improved and stable. He is on zosyn. Vanc dc'd. PT/OT/ST consulted. He is a full code. Ok to transfer to telemetry for further management.     Patient is stable on room air. Cultures remain negative. Will transition to oral augmentin x 5 more days to complete 1 week total. Notified facility and will discharge today.      Goals of Care  Treatment Preferences:  Code Status: Full Code    Living Will: Yes              Consults:     Pulmonary  Pneumonia    Empiric antibiotics  Follow up chest x-ray in a.m.  Wean O2 as tolerated  Nebs PRN  procal - normal   Transition to oral augmentin and dc       Final Active Diagnoses:    Diagnosis Date Noted POA    PRINCIPAL PROBLEM:  Encephalopathy, metabolic [G93.41] 05/18/2024 Yes    Dementia with behavioral disturbance [F03.918] 06/13/2024 Yes    Pneumonia [J18.9] 05/18/2024 Yes      Problems Resolved During this Admission:    Diagnosis Date Noted Date Resolved POA    Sepsis [A41.9] 05/18/2024 06/21/2024 Yes    Symptomatic bradycardia [R00.1] 05/18/2024 06/21/2024 Yes       Discharged Condition: good    Disposition: Home-Health Care [facility]    Follow Up:   Follow-up Information       No, Primary Doctor .                           Patient Instructions:      Ambulatory referral/consult to Home Health   Referral Priority: Routine Referral Type: Home Health   Referral Reason: Specialty Services Required   Requested Specialty: Home Health Services   Number of Visits Requested: 1     Diet Cardiac     Notify your health care provider if you experience any of the following:  temperature >100.4     Notify your health care provider if you experience any of the following:  persistent nausea and vomiting or diarrhea     Notify your health care provider if you experience any of the following:  difficulty breathing or increased cough     Notify your health care provider if you experience any of the following:  increased confusion or weakness     Activity as tolerated       Significant Diagnostic Studies: N/A    Pending Diagnostic Studies:       None           Medications:  Reconciled Home Medications:      Medication List        START taking these medications      amoxicillin-clavulanate 875-125mg 875-125 mg per tablet  Commonly known as: AUGMENTIN  Take 1 tablet by mouth 2 (two) times daily. for 5 days            CONTINUE taking  these medications      cyanocobalamin 500 MCG tablet  Take 500 mcg by mouth once daily.     metoprolol tartrate 25 MG tablet  Commonly known as: LOPRESSOR  Take 0.5 tablets (12.5 mg total) by mouth 2 (two) times daily.     QUEtiapine 50 MG tablet  Commonly known as: SEROQUEL  Take 1 tablet (50 mg total) by mouth after dinner.              Indwelling Lines/Drains at time of discharge:   Lines/Drains/Airways       None                   Time spent on the discharge of patient: 35 minutes    Critical care time spent on the evaluation and treatment of severe organ dysfunction, review of pertinent labs and imaging studies, discussions with consulting providers and discussions with patient/family: 15 minutes.     Myra Lawrence MD  Department of Hospital Medicine  [facility]

## 2024-06-22 LAB
OHS QRS DURATION: 118 MS
OHS QRS DURATION: 158 MS
OHS QTC CALCULATION: 494 MS
OHS QTC CALCULATION: 512 MS

## 2024-06-23 LAB
BACTERIA BLD CULT: NORMAL
BACTERIA BLD CULT: NORMAL

## 2024-07-03 ENCOUNTER — DOCUMENT SCAN (OUTPATIENT)
Dept: HOME HEALTH SERVICES | Facility: HOSPITAL | Age: 89
End: 2024-07-03
Payer: MEDICARE

## 2024-07-15 ENCOUNTER — DOCUMENT SCAN (OUTPATIENT)
Dept: HOME HEALTH SERVICES | Facility: HOSPITAL | Age: 89
End: 2024-07-15
Payer: MEDICARE

## 2024-07-17 ENCOUNTER — DOCUMENT SCAN (OUTPATIENT)
Dept: HOME HEALTH SERVICES | Facility: HOSPITAL | Age: 89
End: 2024-07-17
Payer: MEDICARE

## 2024-07-25 ENCOUNTER — DOCUMENT SCAN (OUTPATIENT)
Dept: HOME HEALTH SERVICES | Facility: HOSPITAL | Age: 89
End: 2024-07-25
Payer: MEDICARE

## 2024-07-30 ENCOUNTER — DOCUMENT SCAN (OUTPATIENT)
Dept: HOME HEALTH SERVICES | Facility: HOSPITAL | Age: 89
End: 2024-07-30
Payer: MEDICARE

## 2024-08-15 ENCOUNTER — EXTERNAL HOME HEALTH (OUTPATIENT)
Dept: HOME HEALTH SERVICES | Facility: HOSPITAL | Age: 89
End: 2024-08-15
Payer: MEDICARE

## 2024-09-03 ENCOUNTER — DOCUMENT SCAN (OUTPATIENT)
Dept: HOME HEALTH SERVICES | Facility: HOSPITAL | Age: 89
End: 2024-09-03
Payer: MEDICARE

## 2024-09-23 PROBLEM — J18.9 PNEUMONIA: Status: RESOLVED | Noted: 2024-05-18 | Resolved: 2024-09-23

## 2024-10-07 ENCOUNTER — HOSPITAL ENCOUNTER (EMERGENCY)
Facility: HOSPITAL | Age: 89
Discharge: HOME | End: 2024-10-07
Attending: EMERGENCY MEDICINE
Payer: MEDICARE

## 2024-10-07 VITALS
WEIGHT: 185 LBS | HEART RATE: 66 BPM | TEMPERATURE: 98 F | OXYGEN SATURATION: 95 % | DIASTOLIC BLOOD PRESSURE: 60 MMHG | BODY MASS INDEX: 25.09 KG/M2 | RESPIRATION RATE: 11 BRPM | SYSTOLIC BLOOD PRESSURE: 118 MMHG

## 2024-10-07 DIAGNOSIS — R11.2 NAUSEA AND VOMITING, UNSPECIFIED VOMITING TYPE: Primary | ICD-10-CM

## 2024-10-07 DIAGNOSIS — I95.9 HYPOTENSION: ICD-10-CM

## 2024-10-07 LAB
ABO + RH BLD: NORMAL
ALBUMIN SERPL BCP-MCNC: 3.5 G/DL (ref 3.5–5.2)
ALP SERPL-CCNC: 71 U/L (ref 55–135)
ALT SERPL W/O P-5'-P-CCNC: 11 U/L (ref 10–44)
ANION GAP SERPL CALC-SCNC: 4 MMOL/L (ref 8–16)
AST SERPL-CCNC: 18 U/L (ref 10–40)
BASOPHILS # BLD AUTO: 0.04 K/UL (ref 0–0.2)
BASOPHILS NFR BLD: 0.5 % (ref 0–1.9)
BILIRUB SERPL-MCNC: 1.2 MG/DL (ref 0.1–1)
BILIRUB UR QL STRIP: NEGATIVE
BLD GP AB SCN CELLS X3 SERPL QL: NORMAL
BUN SERPL-MCNC: 18 MG/DL (ref 8–23)
CALCIUM SERPL-MCNC: 8.8 MG/DL (ref 8.7–10.5)
CHLORIDE SERPL-SCNC: 106 MMOL/L (ref 95–110)
CLARITY UR: CLEAR
CO2 SERPL-SCNC: 29 MMOL/L (ref 23–29)
COLOR UR: YELLOW
CREAT SERPL-MCNC: 0.8 MG/DL (ref 0.5–1.4)
DIFFERENTIAL METHOD BLD: ABNORMAL
EOSINOPHIL # BLD AUTO: 0.1 K/UL (ref 0–0.5)
EOSINOPHIL NFR BLD: 1.8 % (ref 0–8)
ERYTHROCYTE [DISTWIDTH] IN BLOOD BY AUTOMATED COUNT: 13.9 % (ref 11.5–14.5)
EST. GFR  (NO RACE VARIABLE): >60 ML/MIN/1.73 M^2
GLUCOSE SERPL-MCNC: 100 MG/DL (ref 70–110)
GLUCOSE UR QL STRIP: NEGATIVE
HCT VFR BLD AUTO: 40.3 % (ref 40–54)
HGB BLD-MCNC: 13.1 G/DL (ref 14–18)
HGB UR QL STRIP: NEGATIVE
IMM GRANULOCYTES # BLD AUTO: 0.05 K/UL (ref 0–0.04)
IMM GRANULOCYTES NFR BLD AUTO: 0.7 % (ref 0–0.5)
KETONES UR QL STRIP: NEGATIVE
LACTATE SERPL-SCNC: 1.4 MMOL/L (ref 0.5–1.9)
LEUKOCYTE ESTERASE UR QL STRIP: NEGATIVE
LIPASE SERPL-CCNC: 37 U/L (ref 4–60)
LYMPHOCYTES # BLD AUTO: 1.6 K/UL (ref 1–4.8)
LYMPHOCYTES NFR BLD: 21.2 % (ref 18–48)
MCH RBC QN AUTO: 33.1 PG (ref 27–31)
MCHC RBC AUTO-ENTMCNC: 32.5 G/DL (ref 32–36)
MCV RBC AUTO: 102 FL (ref 82–98)
MONOCYTES # BLD AUTO: 0.6 K/UL (ref 0.3–1)
MONOCYTES NFR BLD: 8.6 % (ref 4–15)
NEUTROPHILS # BLD AUTO: 4.9 K/UL (ref 1.8–7.7)
NEUTROPHILS NFR BLD: 67.2 % (ref 38–73)
NITRITE UR QL STRIP: NEGATIVE
NRBC BLD-RTO: 0 /100 WBC
PH UR STRIP: 6 [PH] (ref 5–8)
PLATELET # BLD AUTO: 190 K/UL (ref 150–450)
PMV BLD AUTO: 10.1 FL (ref 9.2–12.9)
POTASSIUM SERPL-SCNC: 4.2 MMOL/L (ref 3.5–5.1)
PROT SERPL-MCNC: 5.9 G/DL (ref 6–8.4)
PROT UR QL STRIP: NEGATIVE
RBC # BLD AUTO: 3.96 M/UL (ref 4.6–6.2)
SODIUM SERPL-SCNC: 139 MMOL/L (ref 136–145)
SP GR UR STRIP: 1.02 (ref 1–1.03)
SPECIMEN OUTDATE: NORMAL
TROPONIN I SERPL HS-MCNC: 10.7 PG/ML (ref 0–14.9)
URN SPEC COLLECT METH UR: ABNORMAL
UROBILINOGEN UR STRIP-ACNC: ABNORMAL EU/DL
WBC # BLD AUTO: 7.35 K/UL (ref 3.9–12.7)

## 2024-10-07 PROCEDURE — 85025 COMPLETE CBC W/AUTO DIFF WBC: CPT | Performed by: NURSE PRACTITIONER

## 2024-10-07 PROCEDURE — 25000003 PHARM REV CODE 250: Performed by: EMERGENCY MEDICINE

## 2024-10-07 PROCEDURE — 83605 ASSAY OF LACTIC ACID: CPT | Performed by: EMERGENCY MEDICINE

## 2024-10-07 PROCEDURE — 63600175 PHARM REV CODE 636 W HCPCS: Performed by: EMERGENCY MEDICINE

## 2024-10-07 PROCEDURE — 96361 HYDRATE IV INFUSION ADD-ON: CPT

## 2024-10-07 PROCEDURE — 83690 ASSAY OF LIPASE: CPT | Performed by: EMERGENCY MEDICINE

## 2024-10-07 PROCEDURE — 25500020 PHARM REV CODE 255: Performed by: EMERGENCY MEDICINE

## 2024-10-07 PROCEDURE — 99285 EMERGENCY DEPT VISIT HI MDM: CPT | Mod: 25

## 2024-10-07 PROCEDURE — 86901 BLOOD TYPING SEROLOGIC RH(D): CPT | Performed by: NURSE PRACTITIONER

## 2024-10-07 PROCEDURE — 96374 THER/PROPH/DIAG INJ IV PUSH: CPT

## 2024-10-07 PROCEDURE — 93005 ELECTROCARDIOGRAM TRACING: CPT | Performed by: INTERNAL MEDICINE

## 2024-10-07 PROCEDURE — 86850 RBC ANTIBODY SCREEN: CPT | Performed by: NURSE PRACTITIONER

## 2024-10-07 PROCEDURE — 81003 URINALYSIS AUTO W/O SCOPE: CPT | Performed by: EMERGENCY MEDICINE

## 2024-10-07 PROCEDURE — 93010 ELECTROCARDIOGRAM REPORT: CPT | Mod: ,,, | Performed by: INTERNAL MEDICINE

## 2024-10-07 PROCEDURE — 86900 BLOOD TYPING SEROLOGIC ABO: CPT | Performed by: NURSE PRACTITIONER

## 2024-10-07 PROCEDURE — 84484 ASSAY OF TROPONIN QUANT: CPT | Performed by: EMERGENCY MEDICINE

## 2024-10-07 PROCEDURE — 80053 COMPREHEN METABOLIC PANEL: CPT | Performed by: EMERGENCY MEDICINE

## 2024-10-07 RX ORDER — ONDANSETRON HYDROCHLORIDE 2 MG/ML
4 INJECTION, SOLUTION INTRAVENOUS
Status: COMPLETED | OUTPATIENT
Start: 2024-10-07 | End: 2024-10-07

## 2024-10-07 RX ADMIN — IOHEXOL 100 ML: 350 INJECTION, SOLUTION INTRAVENOUS at 11:10

## 2024-10-07 RX ADMIN — ONDANSETRON 4 MG: 2 INJECTION INTRAMUSCULAR; INTRAVENOUS at 11:10

## 2024-10-07 RX ADMIN — SODIUM CHLORIDE 1000 ML: 9 INJECTION, SOLUTION INTRAVENOUS at 11:10

## 2024-10-07 NOTE — ED PROVIDER NOTES
Chief complaint:  Fatigue, Vomiting, and Hypotension      HPI:  Elpidio Ayala is a 89 y.o. male cognitive impairment, heart block, s/p pacemaker, dementia presenting with reported emesis from nursing facility starting this morning, nonbilious and nonbloody.  Family member at the bedside who spoke with nursing staff sent the patient from facility.  He had apparently gone to breakfast this morning prior to emesis.  Patient is a poor historian and does not recall recent events.  There is no change in mental status.  He has no complaints himself at present.  EMS reported some degree of hypotension not present on arrival.  Complaints from nursing facility also include generalized fatigue without focal numbness or weakness.    ROS: As per HPI and below:  No headache, head injury, fall, change in mental status, fever, dysuria, chest pain, difficulty breathing, abdominal pain, diarrhea blood in the stools.    Review of patient's allergies indicates:   Allergen Reactions    No known drug allergies        Patient's Medications   New Prescriptions    No medications on file   Previous Medications    CYANOCOBALAMIN 500 MCG TABLET    Take 500 mcg by mouth once daily.    METOPROLOL TARTRATE (LOPRESSOR) 25 MG TABLET    Take 0.5 tablets (12.5 mg total) by mouth 2 (two) times daily.    QUETIAPINE (SEROQUEL) 50 MG TABLET    Take 1 tablet (50 mg total) by mouth after dinner.   Modified Medications    No medications on file   Discontinued Medications    No medications on file       PMH:  As per HPI and below:  Past Medical History:   Diagnosis Date    BPH (benign prostatic hypertrophy)     Bradycardia     Dementia     Mobitz (type) I (Wenckebach's) atrioventricular block     Poor historian     Scrotal mass     Wears glasses      Past Surgical History:   Procedure Laterality Date    A-V CARDIAC PACEMAKER INSERTION N/A 6/12/2024    Procedure: INSERTION, CARDIAC PACEMAKER, DUAL CHAMBER;  Surgeon: Arie Jimenez MD;  Location:  [facility] CATH/EP LAB;  Service: Cardiology;  Laterality: N/A;    EYE SURGERY  BILAT CATARACT WITH LENS    PROSTATE SURGERY  2009    TONSILLECTOMY         Social History     Socioeconomic History    Marital status:    Tobacco Use    Smoking status: Never    Smokeless tobacco: Never   Substance and Sexual Activity    Alcohol use: No    Drug use: No     Social Drivers of Health     Financial Resource Strain: Patient Declined (6/20/2024)    Overall Financial Resource Strain (CARDIA)     Difficulty of Paying Living Expenses: Patient declined   Food Insecurity: Patient Declined (6/20/2024)    Hunger Vital Sign     Worried About Running Out of Food in the Last Year: Patient declined     Ran Out of Food in the Last Year: Patient declined   Transportation Needs: Patient Declined (6/20/2024)    TRANSPORTATION NEEDS     Transportation : Patient declined   Stress: Patient Declined (6/20/2024)    [employer] - Occupational Stress Questionnaire     Feeling of Stress : Patient declined   Housing Stability: Patient Declined (6/20/2024)    Housing Stability Vital Sign     Unable to Pay for Housing in the Last Year: Patient declined     Homeless in the Last Year: Patient declined       Family History   Problem Relation Name Age of Onset    Prostate cancer Neg Hx      Urolithiasis Neg Hx      Kidney cancer Neg Hx         Physical Exam:    Vitals:    10/07/24 1312   BP:    Pulse: 64   Resp: 15   Temp:      GENERAL:  No apparent distress.  Alert.    HEENT:  Moist mucous membranes.  Normocephalic and atraumatic.    NECK:  No swelling.  Midline trachea.  Supple.  CARDIOVASCULAR:  Regular rate and rhythm.  2+ radial pulses.  No murmur.  PULMONARY:  Lungs clear to auscultation bilaterally.  No wheezes, rales, or rhonci.  No tachypnea.  ABDOMEN:  Right upper and right lower quadrant tenderness with mild voluntary guarding.  No involuntary guarding, distention, masses, palpable hernias.  EXTREMITIES:  Warm and  well perfused.  Brisk capillary refill.  1+ pitting pedal edema.  2+ DP and PT pulses bilaterally.  NEUROLOGICAL:  Normal mental status.  Appropriate and conversant.  Symmetrically weak with 4+ out of five strength and no asymmetric weakness.  Equal sensation to light touch.  Cranial nerves 3-12 intact.  Poor short-term recall.  Drowsy but arouses to voice.  GCS is 15.  SKIN:  No rashes or ecchymoses.    BACK:  Atraumatic.  No CVA tenderness to palpation.      Labs Reviewed   CBC W/ AUTO DIFFERENTIAL - Abnormal       Result Value    WBC 7.35      RBC 3.96 (*)     Hemoglobin 13.1 (*)     Hematocrit 40.3       (*)     MCH 33.1 (*)     MCHC 32.5      RDW 13.9      Platelets 190      MPV 10.1      Immature Granulocytes 0.7 (*)     Gran # (ANC) 4.9      Immature Grans (Abs) 0.05 (*)     Lymph # 1.6      Mono # 0.6      Eos # 0.1      Baso # 0.04      nRBC 0      Gran % 67.2      Lymph % 21.2      Mono % 8.6      Eosinophil % 1.8      Basophil % 0.5      Differential Method Automated     URINALYSIS, REFLEX TO URINE CULTURE - Abnormal    Specimen UA Urine, Catheterized      Color, UA Yellow      Appearance, UA Clear      pH, UA 6.0      Specific Gravity, UA 1.020      Protein, UA Negative      Glucose, UA Negative      Ketones, UA Negative      Bilirubin (UA) Negative      Occult Blood UA Negative      Nitrite, UA Negative      Urobilinogen, UA 2.0-3.0 (*)     Leukocytes, UA Negative      Narrative:     Specimen Source->Urine   COMPREHENSIVE METABOLIC PANEL - Abnormal    Sodium 139      Potassium 4.2      Chloride 106      CO2 29      Glucose 100      BUN 18      Creatinine 0.8      Calcium 8.8      Total Protein 5.9 (*)     Albumin 3.5      Total Bilirubin 1.2 (*)     Alkaline Phosphatase 71      AST 18      ALT 11      eGFR >60.0      Anion Gap 4 (*)    LIPASE   LACTIC ACID, PLASMA    Lactate (Lactic Acid) 1.4     TROPONIN I HIGH SENSITIVITY    Troponin I High Sensitivity 10.7     LIPASE    Lipase 37     TYPE &  SCREEN    Group & Rh B POS      Indirect Michelle NEG      Specimen Outdate 10/10/2024 23:59         Current Discharge Medication List        CONTINUE these medications which have NOT CHANGED    Details   cyanocobalamin 500 MCG tablet Take 500 mcg by mouth once daily.      metoprolol tartrate (LOPRESSOR) 25 MG tablet Take 0.5 tablets (12.5 mg total) by mouth 2 (two) times daily.  Qty: 30 tablet, Refills: 0    Comments: .      QUEtiapine (SEROQUEL) 50 MG tablet Take 1 tablet (50 mg total) by mouth after dinner.  Qty: 30 tablet, Refills: 0             Orders Placed This Encounter   Procedures    X-Ray Chest 1 View    CT Abdomen Pelvis With IV Contrast NO Oral Contrast    CBC auto differential    Lipase    Lactic acid, plasma    Troponin I High Sensitivity    Urinalysis, Reflex to Urine Culture Urine, Catheterized    Lipase    Comprehensive Metabolic Panel    Straight cath    Vital signs    EKG 12-lead    Type & Screen    Insert peripheral IV    [facility] Facilitated Transportation Request       Imaging Results              CT Abdomen Pelvis With IV Contrast NO Oral Contrast (Final result)  Result time 10/07/24 12:19:28      Final result by Narciso Knight MD (10/07/24 12:19:28)                   Impression:      1. Large volume stool in the rectum with small to moderate amount of stool in colon elsewhere.  2. Moderate size fat containing right inguinal hernia.  3. No other acute findings throughout the abdomen and pelvis.  4. Coronary artery calcification.      Electronically signed by: Narciso Knight  Date:    10/07/2024  Time:    12:19               Narrative:    EXAMINATION:  CT ABDOMEN PELVIS WITH IV CONTRAST    CLINICAL HISTORY:  Nausea/vomiting;    TECHNIQUE:  CT abdomen and pelvis with 100 mL Omnipaque 350.    COMPARISON:  Ultrasound 05/18/2024    FINDINGS:  CT ABDOMEN:    Right diaphragm elevation or eventration unchanged with linear right basilar lower lobe opacities suggesting atelectasis unchanged.  Pacing leads  course in right cardiac chambers.  Coronary artery calcifications are present.    Liver, gallbladder, pancreas, spleen, and adrenals are normal.  Bilateral renal cortical hypodensities are too small to characterize with 5 mm inferior left renal partially exophytic density and 5 mm posterior right mid renal exophytic density of indeterminate etiology.  Bilateral renal sinus cysts are present.  Negative for hydronephrosis.    Minor aortoiliac calcifications are diffuse.    Large volume stool lies within the rectum.  Elsewhere, colon contains mild to moderate amount of stool.  A normal appendix is present no free intraperitoneal gas.  Focus of mixed fat, soft tissue, and calcific density adjacent to the colon in the left abdomen measuring 12 mm (image 125) is felt of no clinical significance.    Advanced thoracolumbar spine disc degeneration and facet joint osteoarthrosis is diffuse resulting in multifocal moderate to severe central canal narrowing in the lumbar spine.    CT PELVIS:    Bladder is normal.  Moderate-sized fat containing right inguinal hernia is present.  No free pelvic fluid.  No acute osseous abnormality.                                       X-Ray Chest 1 View (Final result)  Result time 10/07/24 11:21:08      Final result by Elif Yost MD (10/07/24 11:21:08)                   Impression:      Atelectasis in the lung bases with no acute pulmonary process      Electronically signed by: Elif Ysot  Date:    10/07/2024  Time:    11:21               Narrative:    EXAMINATION:  XR CHEST 1 VIEW    CLINICAL HISTORY:  Vomiting, r/o pna;    FINDINGS:  Portable chest x-ray at 10:58 is compared to a prior study dated 06/19/2024    The cardiomediastinal silhouette is stable.  There is a right subclavian vein dual lead pacemaker in stable position.    There is stable elevation the right hemidiaphragm.  There is atelectasis in the lung bases.  The upper lobes are clear.  There are no pleural  effusions.                                      ED Course as of 10/07/24 1424   Mon Oct 07, 2024   1049 EKG:  AV dual-paced rhythm, rate of 60, wide QRS, L axis.  Appropriate discordance without sign of acute ischemia. (Independently interpreted by me) [MR]   1305 Patient much more alert on reassessment and once again denies any complaints.  No emesis since initial assessment and ED arrival. [MR]   1307 Patient ambulating well with the assistance per RN.  No recurrent hypotension or other concerning issues and observation. [MR]      ED Course User Index  [MR] Manjeet Toledo MD       MDM:    89 y.o. male with multiple complaints of a general nature including vomiting and generalized weakness starting this morning.  There is no focal deficit I doubt acute intracranial process such as CVA.  There is no sign of trauma.  There is no change in mental status with chronic cognitive impairment noted.  Doubt acute intracranial process such as CVA.  Initial labs done with consideration of weakness to assess for end-organ dysfunction such as electrolyte derangement hepatic renal insult.  Low suspicion for cardiac process with EKG ordered showing no sign of ischemia and paced rhythm.  Does have some right-sided tenderness with CT ordered to exclude acute, life-threatening process such as appendicitis, abscess, cholecystitis, obstruction low suspicion. Hypotension reported pre-hospital resolved here with lactic acid sent for further risk stratification of sepsis.      Patient reassessed and well-appearing, alert with normal ambulation with the assistance.  No recurrent hypotension and in fact mildly hypertensive.  I do not think he would benefit from admission at this point.  Lactic acid is normal I doubt sepsis.  Potential etiologies such as medication side effects that are transient resolved considered.  He is appropriate for outpatient PCP follow-up with detailed return precautions reviewed.    Diagnoses:    1.  Vomiting  2. Transient low blood pressure       Manjeet Toledo MD  10/07/24 [id number]

## 2024-10-07 NOTE — DISCHARGE INSTRUCTIONS
As we discussed, return to the emergency department for new or worsening symptoms including feeling of passing out, inability to keep down fluids, or new chest or abdominal pain.

## 2024-10-08 LAB
OHS QRS DURATION: 140 MS
OHS QTC CALCULATION: 466 MS

## 2024-11-08 ENCOUNTER — HOSPITAL ENCOUNTER (EMERGENCY)
Facility: HOSPITAL | Age: 89
Discharge: HOME | End: 2024-11-08
Attending: EMERGENCY MEDICINE
Payer: MEDICARE

## 2024-11-08 ENCOUNTER — CLINICAL SUPPORT (OUTPATIENT)
Dept: CARDIOLOGY | Facility: CLINIC | Age: 89
End: 2024-11-08

## 2024-11-08 ENCOUNTER — HOSPITAL ENCOUNTER (OUTPATIENT)
Dept: CARDIOLOGY | Facility: CLINIC | Age: 89
Discharge: HOME | End: 2024-11-08
Attending: INTERNAL MEDICINE
Payer: MEDICARE

## 2024-11-08 VITALS
HEIGHT: 72 IN | OXYGEN SATURATION: 96 % | TEMPERATURE: 98 F | SYSTOLIC BLOOD PRESSURE: 144 MMHG | WEIGHT: 185 LBS | RESPIRATION RATE: 18 BRPM | DIASTOLIC BLOOD PRESSURE: 72 MMHG | HEART RATE: 60 BPM | BODY MASS INDEX: 25.06 KG/M2

## 2024-11-08 DIAGNOSIS — Z95.0 PRESENCE OF CARDIAC PACEMAKER: ICD-10-CM

## 2024-11-08 DIAGNOSIS — R55 SYNCOPE: ICD-10-CM

## 2024-11-08 DIAGNOSIS — R00.1 BRADYCARDIA, UNSPECIFIED: ICD-10-CM

## 2024-11-08 LAB
ALBUMIN SERPL BCP-MCNC: 3.4 G/DL (ref 3.5–5.2)
ALP SERPL-CCNC: 57 U/L (ref 55–135)
ALT SERPL W/O P-5'-P-CCNC: 11 U/L (ref 10–44)
ANION GAP SERPL CALC-SCNC: 5 MMOL/L (ref 8–16)
AST SERPL-CCNC: 24 U/L (ref 10–40)
BASOPHILS # BLD AUTO: 0.04 K/UL (ref 0–0.2)
BASOPHILS NFR BLD: 0.6 % (ref 0–1.9)
BILIRUB SERPL-MCNC: 1.2 MG/DL (ref 0.1–1)
BUN SERPL-MCNC: 19 MG/DL (ref 8–23)
CALCIUM SERPL-MCNC: 8.3 MG/DL (ref 8.7–10.5)
CHLORIDE SERPL-SCNC: 109 MMOL/L (ref 95–110)
CO2 SERPL-SCNC: 26 MMOL/L (ref 23–29)
CREAT SERPL-MCNC: 0.9 MG/DL (ref 0.5–1.4)
DIFFERENTIAL METHOD BLD: ABNORMAL
EOSINOPHIL # BLD AUTO: 0.1 K/UL (ref 0–0.5)
EOSINOPHIL NFR BLD: 1.7 % (ref 0–8)
ERYTHROCYTE [DISTWIDTH] IN BLOOD BY AUTOMATED COUNT: 14.4 % (ref 11.5–14.5)
EST. GFR  (NO RACE VARIABLE): >60 ML/MIN/1.73 M^2
GLUCOSE SERPL-MCNC: 83 MG/DL (ref 70–110)
HCT VFR BLD AUTO: 39.1 % (ref 40–54)
HGB BLD-MCNC: 13 G/DL (ref 14–18)
IMM GRANULOCYTES # BLD AUTO: 0.03 K/UL (ref 0–0.04)
IMM GRANULOCYTES NFR BLD AUTO: 0.4 % (ref 0–0.5)
LYMPHOCYTES # BLD AUTO: 1.5 K/UL (ref 1–4.8)
LYMPHOCYTES NFR BLD: 21 % (ref 18–48)
MAGNESIUM SERPL-MCNC: 1.9 MG/DL (ref 1.6–2.6)
MCH RBC QN AUTO: 33.8 PG (ref 27–31)
MCHC RBC AUTO-ENTMCNC: 33.2 G/DL (ref 32–36)
MCV RBC AUTO: 102 FL (ref 82–98)
MONOCYTES # BLD AUTO: 0.8 K/UL (ref 0.3–1)
MONOCYTES NFR BLD: 10.5 % (ref 4–15)
NEUTROPHILS # BLD AUTO: 4.8 K/UL (ref 1.8–7.7)
NEUTROPHILS NFR BLD: 65.8 % (ref 38–73)
NRBC BLD-RTO: 0 /100 WBC
OHS QRS DURATION: 138 MS
OHS QTC CALCULATION: 452 MS
PLATELET # BLD AUTO: 169 K/UL (ref 150–450)
PMV BLD AUTO: 10.7 FL (ref 9.2–12.9)
POTASSIUM SERPL-SCNC: 4.9 MMOL/L (ref 3.5–5.1)
PROT SERPL-MCNC: 5.6 G/DL (ref 6–8.4)
RBC # BLD AUTO: 3.85 M/UL (ref 4.6–6.2)
SODIUM SERPL-SCNC: 140 MMOL/L (ref 136–145)
TROPONIN I SERPL HS-MCNC: 9.6 PG/ML (ref 0–14.9)
TSH SERPL DL<=0.005 MIU/L-ACNC: 3.71 UIU/ML (ref 0.34–5.6)
WBC # BLD AUTO: 7.24 K/UL (ref 3.9–12.7)

## 2024-11-08 PROCEDURE — 83735 ASSAY OF MAGNESIUM: CPT | Performed by: EMERGENCY MEDICINE

## 2024-11-08 PROCEDURE — 93294 REM INTERROG EVL PM/LDLS PM: CPT | Mod: ,,, | Performed by: INTERNAL MEDICINE

## 2024-11-08 PROCEDURE — 99285 EMERGENCY DEPT VISIT HI MDM: CPT | Mod: 25

## 2024-11-08 PROCEDURE — 84484 ASSAY OF TROPONIN QUANT: CPT | Performed by: EMERGENCY MEDICINE

## 2024-11-08 PROCEDURE — 80053 COMPREHEN METABOLIC PANEL: CPT | Performed by: EMERGENCY MEDICINE

## 2024-11-08 PROCEDURE — 84443 ASSAY THYROID STIM HORMONE: CPT | Performed by: EMERGENCY MEDICINE

## 2024-11-08 PROCEDURE — 85025 COMPLETE CBC W/AUTO DIFF WBC: CPT | Performed by: EMERGENCY MEDICINE

## 2024-11-08 NOTE — ED PROVIDER NOTES
Encounter Date: 11/8/2024       History     Chief Complaint   Patient presents with    Loss of Consciousness     Patient from [facility].  Patient was having hair cut and noted to have unresponsive for less than 1 min. SBP in the 70's upon EMS arrival.  Patient is awake is alert      Patient with witnessed syncopal episode while leaning back and Aspen wish chair.  No seizure-like activity.  Apparently patient with similar syncopal episodes in the past.  Patient returns to normal mental status per EMS.  History of confusion.  No head trauma.  Patient does have pacemaker.  Patient denies any trouble with vision or speech.  No focal weakness.      Review of patient's allergies indicates:   Allergen Reactions    No known drug allergies      Past Medical History:   Diagnosis Date    BPH (benign prostatic hypertrophy)     Bradycardia     Dementia     Mobitz (type) I (Wenckebach's) atrioventricular block     Poor historian     Scrotal mass     Wears glasses      Past Surgical History:   Procedure Laterality Date    A-V CARDIAC PACEMAKER INSERTION N/A 6/12/2024    Procedure: INSERTION, CARDIAC PACEMAKER, DUAL CHAMBER;  Surgeon: Arie Jimenez MD;  Location: [facility] CATH/EP LAB;  Service: Cardiology;  Laterality: N/A;    EYE SURGERY  BILAT CATARACT WITH LENS    PROSTATE SURGERY  2009    TONSILLECTOMY       Family History   Problem Relation Name Age of Onset    Prostate cancer Neg Hx      Urolithiasis Neg Hx      Kidney cancer Neg Hx       Social History     Tobacco Use    Smoking status: Never    Smokeless tobacco: Never   Substance Use Topics    Alcohol use: No    Drug use: No     Review of Systems   Constitutional:  Negative for chills and fever.   HENT:  Negative for sore throat.    Eyes:  Negative for photophobia and visual disturbance.   Respiratory:  Negative for shortness of breath.    Cardiovascular:  Negative for chest pain.   Gastrointestinal:  Negative for abdominal pain and vomiting.    Genitourinary:  Negative for dysuria.   Musculoskeletal:  Negative for joint swelling.   Skin:  Negative for rash.   Neurological:  Positive for syncope. Negative for headaches.       Physical Exam     Initial Vitals [11/08/24 1110]   BP Pulse Resp Temp SpO2   122/68 61 18 97.9 °F (36.6 °C) (!) 92 %      MAP       --         Physical Exam    Nursing note and vitals reviewed.  Constitutional: He is not diaphoretic. No distress.   HENT:   Head: Normocephalic and atraumatic.   No scalp hematomas or evidence of trauma.  No neck pain.   Eyes: Conjunctivae are normal.   Neck:   Normal range of motion.  Cardiovascular:  Regular rhythm.           Pulmonary/Chest: Breath sounds normal.   Abdominal: Abdomen is soft. There is no abdominal tenderness.   Musculoskeletal:         General: Normal range of motion.      Cervical back: Normal range of motion.     Neurological: He is alert. He has normal strength. No cranial nerve deficit or sensory deficit.   Skin: No rash noted.   Psychiatric:   Alert and pleasant.  Patient answers simple social questions.  Memory is impaired.         ED Course   Procedures  Labs Reviewed   CBC W/ AUTO DIFFERENTIAL - Abnormal       Result Value    WBC 7.24      RBC 3.85 (*)     Hemoglobin 13.0 (*)     Hematocrit 39.1 (*)      (*)     MCH 33.8 (*)     MCHC 33.2      RDW 14.4      Platelets 169      MPV 10.7      Immature Granulocytes 0.4      Gran # (ANC) 4.8      Immature Grans (Abs) 0.03      Lymph # 1.5      Mono # 0.8      Eos # 0.1      Baso # 0.04      nRBC 0      Gran % 65.8      Lymph % 21.0      Mono % 10.5      Eosinophil % 1.7      Basophil % 0.6      Differential Method Automated     COMPREHENSIVE METABOLIC PANEL - Abnormal    Sodium 140      Potassium 4.9      Chloride 109      CO2 26      Glucose 83      BUN 19      Creatinine 0.9      Calcium 8.3 (*)     Total Protein 5.6 (*)     Albumin 3.4 (*)     Total Bilirubin 1.2 (*)     Alkaline Phosphatase 57      AST 24      ALT 11       eGFR >60.0      Anion Gap 5 (*)    MAGNESIUM    Magnesium 1.9     TROPONIN I HIGH SENSITIVITY    Troponin I High Sensitivity 9.6     TSH    TSH 3.705          ECG Results              EKG 12-lead (In process)        Collection Time Result Time QRS Duration OHS QTC Calculation    11/08/24 11:11:05 11/08/24 11:16:05 138 452                     In process by Interface, Lab In [facility] (11/08/24 11:16:13)                   Narrative:    Test Reason : R55,    Vent. Rate : 063 BPM     Atrial Rate : 063 BPM     P-R Int : 218 ms          QRS Dur : 138 ms      QT Int : 442 ms       P-R-T Axes : 000 -49 096 degrees     QTc Int : 452 ms    AV dual-paced rhythm with prolonged AV conduction  Abnormal ECG  When compared with ECG of 07-OCT-2024 10:38,  Vent. rate has increased BY   3 BPM    Referred By: [email or web address]   SELF           Confirmed By:                                   Imaging Results              X-Ray Chest AP Portable (Final result)  Result time 11/08/24 11:45:44      Final result by Elias Alfaro MD (11/08/24 11:45:44)                   Impression:      No evidence of acute cardiopulmonary disease.      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  The cardiomediastinal silhouette and pulmonary vasculature are within normal limits.    There is unchanged asymmetric elevation of the right hemidiaphragm, with no consolidation, large pleural effusion, or evidence of pulmonary edema. No pneumothorax.  No acute fractures.                                       Medications - No data to display  Medical Decision Making  Here patient with witnessed syncopal episode.  Differential diagnosis includes bradycardia,  tachyarrhythmia, vasovagal episode, orthostatic hypotension.  Here CBC CMP essentially unremarkable.  EKG is a paced rhythm.  Chest x-ray unremarkable.  No problems with pacemaker.  Here patient's maker interrogated.  No recent events.  Patient did have ventricular tachycardia about 3 months ago.  Patient to follow with his cardiologist.  Patient's brother at bedside who reports patient is very sharp bright now in his doing better than his baseline.  His sitting up in bed communicative and laughing with me.  No focal neurologic deficits.  Given no significant arrhythmia or problems with workup feel patient can be safely discharged.    Amount and/or Complexity of Data Reviewed  Labs: ordered. Decision-making details documented in ED Course.  Radiology: ordered. Decision-making details documented in ED Course.  ECG/medicine tests:  Decision-making details documented in ED Course.                                      Clinical Impression:  Final diagnoses:  [R55] Syncope          ED Disposition Condition    Discharge Stable          ED Prescriptions    None       Follow-up Information       Follow up With Specialties Details Why Contact Info Additional Information    [facility] - Emergency Dept Emergency Medicine  If symptoms worsen [address] [facility] [id number] [phone] 1st floor             Hernan Florence MD  11/08/24 [id number]

## 2024-11-08 NOTE — DISCHARGE INSTRUCTIONS
Call your cardiologist for further evaluation.  Pacemaker interrogation did show a ventricular event/tachycardia in July.  This needs further follow-up.

## 2025-02-11 LAB
OHS CV AF BURDEN PERCENT: < 1
OHS CV DC REMOTE DEVICE TYPE: NORMAL
OHS CV ICD SHOCK: NO
OHS CV RV PACING PERCENT: 78.31 %

## (undated) DEVICE — SUT CTD VICRYL 2-0 UND BR P

## (undated) DEVICE — SUT SILK 0 PSL 30 INCH

## (undated) DEVICE — CABLE PACING ALLGTR CLIP 12

## (undated) DEVICE — CAUTERY BOVIE PENCIL

## (undated) DEVICE — SUT 2-0 VICRYL / CT-1

## (undated) DEVICE — INTRODUCER PRELUDESNAP 7F 13CM

## (undated) DEVICE — WIRE GUIDE WHOLEY MOD J .035

## (undated) DEVICE — SUT CTD VICRYL CT-1 UND BR

## (undated) DEVICE — SUT 4.0 VICRYL

## (undated) DEVICE — SHEATH INTRODUCER 5FR 10CM